# Patient Record
Sex: FEMALE | Race: BLACK OR AFRICAN AMERICAN | NOT HISPANIC OR LATINO | Employment: FULL TIME | URBAN - METROPOLITAN AREA
[De-identification: names, ages, dates, MRNs, and addresses within clinical notes are randomized per-mention and may not be internally consistent; named-entity substitution may affect disease eponyms.]

---

## 2017-01-23 ENCOUNTER — OFFICE VISIT (OUTPATIENT)
Dept: INTERNAL MEDICINE | Facility: CLINIC | Age: 52
End: 2017-01-23

## 2017-01-23 VITALS
HEIGHT: 63 IN | WEIGHT: 118.2 LBS | SYSTOLIC BLOOD PRESSURE: 136 MMHG | BODY MASS INDEX: 20.94 KG/M2 | TEMPERATURE: 97.7 F | DIASTOLIC BLOOD PRESSURE: 84 MMHG

## 2017-01-23 DIAGNOSIS — F41.8 DEPRESSION WITH ANXIETY: Primary | ICD-10-CM

## 2017-01-23 PROCEDURE — 99213 OFFICE O/P EST LOW 20 MIN: CPT | Performed by: FAMILY MEDICINE

## 2017-01-23 RX ORDER — BUSPIRONE HYDROCHLORIDE 10 MG/1
TABLET ORAL
Qty: 30 TABLET | Refills: 0 | Status: SHIPPED | OUTPATIENT
Start: 2017-01-23 | End: 2017-03-10 | Stop reason: SDUPTHER

## 2017-01-23 RX ORDER — VENLAFAXINE HYDROCHLORIDE 75 MG/1
75 CAPSULE, EXTENDED RELEASE ORAL DAILY
Qty: 30 CAPSULE | Refills: 1 | Status: SHIPPED | OUTPATIENT
Start: 2017-01-23 | End: 2017-03-10 | Stop reason: SDUPTHER

## 2017-01-23 NOTE — PATIENT INSTRUCTIONS
"1. PSYCH- depression with anxiety- discussed that she needs a little more push on the serotonin aspect. Will continue the effexor and add \"as needed\" buspar. Discussed that this works within 20 min and lasts about 8 hr. May consider returning to [facility] if needed. If she does not respond adequately, may consider a trial with low dose zyprexa.   2. RECHECK- 1mo  "

## 2017-01-23 NOTE — PROGRESS NOTES
Subjective   Jamaica Shell is a 51 y.o. female.     History of Present Illness   Here for 2mo recheck. Last seen 11/22/16 for 1mo recheck. Pt was seen 10/25/16 as a new patient. Lab 10/25/16 demo normal CBC, CMP, TSH, B12 and lipid with , tg 253, , . Had vit D 12.8 and was advised 5000 IU qd. Had baseline EKG 10/25/16 with ?LVH vs normal variant.     1.CV- HTN. Pt diagnosed 2011. Was treated with Lisinopril but had been out of meds x2yr with no insurance. No known cardio sequelea. Was started on Lisinopril HCT and did well.     2.PSYCH- depression with anxiety. At her initial visit pt reported that her son was shot and killed in 2011. She had not felt well since then. Was sad, irritable and anxious with excessive guilt, anhedonia, feeling overwhelmed, fatigue, withdrawing, decreased motivation, crying, some concentration/ memory problems, some feeling hopeless/ helpless. Was having insomnia but that had improved recently. Pt was started on Cymbalta and reached 50% of goal at 1mo but it was to expensive and she was changed to Effexor for the next 2mo. Today she reports no S/E. Does not feel at goal. Is irritable and has had some anxiety. Has had stress with not being able to see her granddaughter and has been very stressed at work. Has felt panicky 3-4 x/wk. Is still crying frequently and not sleeping well. Overall only ranks her improvement at 30% of goal.    The following portions of the patient's history were reviewed and updated as appropriate: current medications, past family history, past medical history, past social history, past surgical history and problem list.    Review of Systems   Cardiovascular: Negative for chest pain.   Gastrointestinal: Negative for abdominal distention and abdominal pain.   Skin: Negative for color change.   Neurological: Negative for tremors, speech difficulty and headaches.   Psychiatric/Behavioral: Negative for agitation and confusion.   All other systems  "reviewed and are negative.        Current Outpatient Prescriptions:   •  lisinopril-hydrochlorothiazide (ZESTORETIC) 10-12.5 MG per tablet, Take 1 tablet by mouth Daily., Disp: 90 tablet, Rfl: 1  •  venlafaxine XR (EFFEXOR XR) 75 MG 24 hr capsule, Take 1 capsule by mouth Daily., Disp: 30 capsule, Rfl: 1    Objective     Visit Vitals   • /84   • Temp 97.7 °F (36.5 °C)   • Ht 63\" (160 cm)   • Wt 118 lb 3.2 oz (53.6 kg)   • BMI 20.94 kg/m2       Physical Exam   Constitutional: She is oriented to person, place, and time. She appears well-developed and well-nourished.   HENT:   Right Ear: Tympanic membrane and ear canal normal.   Left Ear: Tympanic membrane and ear canal normal.   Mouth/Throat: Oropharynx is clear and moist.   Eyes: Conjunctivae and EOM are normal. Pupils are equal, round, and reactive to light.   Neck: No thyromegaly present.   Cardiovascular: Normal rate and regular rhythm.    Pulmonary/Chest: Effort normal and breath sounds normal.   Neurological: She is alert and oriented to person, place, and time.   Skin: Skin is warm and dry.   Psychiatric: She has a normal mood and affect.   Vitals reviewed.      Assessment/Plan   There are no diagnoses linked to this encounter.    1. PSYCH- depression with anxiety- discussed that she needs a little more push on the serotonin aspect. Will continue the effexor and add \"as needed\" buspar. Discussed that this works within 20 min and lasts about 8 hr. May consider returning to [facility] if needed. If she does not respond adequately, may consider a trial with low dose zyprexa.   2. RECHECK- 1mo       "

## 2017-01-23 NOTE — MR AVS SNAPSHOT
"                        Jamaica Shell   1/23/2017 8:30 AM   Office Visit    Dept Phone:  [phone]   Encounter #:  [id number]    Provider:  Albania Amador MD   Department:  [facility] PRIMARY [facility]ARE                Your Full Care Plan              Today's Medication Changes          These changes are accurate as of: 1/23/17  9:00 AM.  If you have any questions, ask your nurse or doctor.               New Medication(s)Ordered:     busPIRone 10 MG tablet   Commonly known as:  BUSPAR   1/2 tab po q8hr prn anxiety. 1 tab hs prn sleep   Started by:  Albania Amador MD            Where to Get Your Medications      These medications were sent to [address] - [facility] - [phone]  - [phone] [facility] [address] [place] [id number]     Phone:  [phone]     busPIRone 10 MG tablet    venlafaxine XR 75 MG 24 hr capsule                  Your Updated Medication List          This list is accurate as of: 1/23/17  9:00 AM.  Always use your most recent med list.                busPIRone 10 MG tablet   Commonly known as:  BUSPAR   1/2 tab po q8hr prn anxiety. 1 tab hs prn sleep       lisinopril-hydrochlorothiazide 10-12.5 MG per tablet   Commonly known as:  ZESTORETIC   Take 1 tablet by mouth Daily.       venlafaxine XR 75 MG 24 hr capsule   Commonly known as:  EFFEXOR XR   Take 1 capsule by mouth Daily.               You Were Diagnosed With        Codes Comments    Depression with anxiety    -  Primary ICD-10-CM: F41.8  ICD-9-CM: 300.4       Instructions    1. PSYCH- depression with anxiety- discussed that she needs a little more push on the serotonin aspect. Will continue the effexor and add \"as needed\" buspar. Discussed that this works within 20 min and lasts about 8 hr. May consider returning to [facility] if needed. If she does not respond adequately, may consider a trial with low dose " "zyprexa.   2. RECHECK- 1mo     Patient Instructions History      Upcoming Appointments     Visit Type Date Time Department    FOLLOW UP 2017  8:30 AM [facility]    FOLLOW UP 2017  8:30 AM [facility]      MyChart Signup     [facility] [employer] allows you to send messages to your doctor, view your test results, renew your prescriptions, schedule appointments, and more. To sign up, go to [employer] and click on the Sign Up Now link in the New User? box. Enter your [employer] Activation Code exactly as it appears below along with the last four digits of your Social Security Number and your Date of Birth () to complete the sign-up process. If you do not sign up before the expiration date, you must request a new code.    [employer] Activation Code: [email or web address]  Expires: 2017  5:38 AM    If you have questions, you can email [email or web address] or call [phone] to talk to our [employer] staff. Remember, [employer] is NOT to be used for urgent needs. For medical emergencies, dial 911.               Other Info from Your Visit           Your Appointments     2017  8:30 AM EST   Follow Up with Albania Amador MD   [facility] (--)    [facility] [address] [place] [id number] [phone]           Arrive 15 minutes prior to appointment.              Allergies     No Known Allergies      Reason for Visit     Follow-up 2 MONTH RECHECK HTN AND MOOD      Vital Signs     Blood Pressure Temperature Height Weight Body Mass Index Smoking Status    136/84 97.7 °F (36.5 °C) 63\" (160 cm) 118 lb 3.2 oz (53.6 kg) 20.94 kg/m2 Current Every Day Smoker      Problems and Diagnoses Noted     Depression with anxiety        "

## 2017-03-10 ENCOUNTER — OFFICE VISIT (OUTPATIENT)
Dept: INTERNAL MEDICINE | Facility: CLINIC | Age: 52
End: 2017-03-10

## 2017-03-10 VITALS
RESPIRATION RATE: 22 BRPM | HEIGHT: 63 IN | DIASTOLIC BLOOD PRESSURE: 98 MMHG | SYSTOLIC BLOOD PRESSURE: 162 MMHG | HEART RATE: 92 BPM | BODY MASS INDEX: 21.09 KG/M2 | WEIGHT: 119 LBS

## 2017-03-10 DIAGNOSIS — I10 ESSENTIAL HYPERTENSION: ICD-10-CM

## 2017-03-10 DIAGNOSIS — F41.8 DEPRESSION WITH ANXIETY: Primary | ICD-10-CM

## 2017-03-10 PROCEDURE — 99213 OFFICE O/P EST LOW 20 MIN: CPT | Performed by: FAMILY MEDICINE

## 2017-03-10 RX ORDER — VENLAFAXINE HYDROCHLORIDE 75 MG/1
75 CAPSULE, EXTENDED RELEASE ORAL DAILY
Qty: 90 CAPSULE | Refills: 1 | Status: SHIPPED | OUTPATIENT
Start: 2017-03-10 | End: 2017-07-19 | Stop reason: SDUPTHER

## 2017-03-10 RX ORDER — BUSPIRONE HYDROCHLORIDE 10 MG/1
TABLET ORAL
Qty: 30 TABLET | Refills: 0 | Status: SHIPPED | OUTPATIENT
Start: 2017-03-10 | End: 2017-10-04 | Stop reason: SDUPTHER

## 2017-03-10 NOTE — PROGRESS NOTES
Subjective   Jamaica Shell is a 51 y.o. female.     History of Present Illness   Here for 1mo recheck. Last seen 1/23/17 for 2mo recheck. Pt was seen 10/25/16 as a new patient. Lab 10/25/16 demo normal CBC, CMP, TSH, B12 and lipid with , tg 253, , . Had vit D 12.8 and was advised 5000 IU qd. Had baseline EKG 10/25/16 with ?LVH vs normal variant.     1.CV- HTN. Pt diagnosed 2011. Was treated with Lisinopril but had been out of meds x2yr with no insurance. No known cardio sequelea. Was started on Lisinopril HCT and did well.      2.PSYCH- depression with anxiety. At her initial visit pt reported that her son was shot and killed in 2011. She had not felt well since then. Was sad, irritable and anxious with excessive guilt, anhedonia, feeling overwhelmed, fatigue, withdrawing, decreased motivation, crying, some concentration/ memory problems, some feeling hopeless/ helpless. Was having insomnia but that had improved recently. Pt reached 50% on Cymbalta at 1mo but it was expensive and she was changed to Effexor. Had increased stress related to not being allowed to see her grand daughter and was feeling worse. Had relapsed to only 30% of goal with panic attacks. Was given addition of prn buspar and discussed possible addition of atypical. Today she reports no S/E with the buspar and it has worked well. Lately she has needed it more related to the same issue with her daughter/ grand daughter. Is tearful today related to this with elevated BP. Has been working more to keep herself busy. Overall feels well on the effexor.     The following portions of the patient's history were reviewed and updated as appropriate: allergies, current medications, past family history, past medical history, past social history, past surgical history and problem list.    Review of Systems   Constitutional: Negative.    HENT: Negative.    Eyes: Negative.    Respiratory: Negative.    Cardiovascular: Negative.   "  Gastrointestinal: Negative.    Endocrine: Negative.    Genitourinary: Negative.    Musculoskeletal: Negative.    Skin: Negative.    Allergic/Immunologic: Negative.    Neurological: Negative.    Hematological: Negative.    Psychiatric/Behavioral: Negative.          Current Outpatient Prescriptions:   •  busPIRone (BUSPAR) 10 MG tablet, 1/2 tab po q8hr prn anxiety. 1 tab hs prn sleep, Disp: 30 tablet, Rfl: 0  •  lisinopril-hydrochlorothiazide (ZESTORETIC) 10-12.5 MG per tablet, Take 1 tablet by mouth Daily., Disp: 90 tablet, Rfl: 1  •  venlafaxine XR (EFFEXOR XR) 75 MG 24 hr capsule, Take 1 capsule by mouth Daily., Disp: 90 capsule, Rfl: 1    Objective     Visit Vitals   • /98   • Pulse 92   • Resp 22   • Ht 63\" (160 cm)   • Wt 119 lb (54 kg)   • BMI 21.08 kg/m2       Physical Exam   Constitutional: She is oriented to person, place, and time. She appears well-developed and well-nourished.   HENT:   Right Ear: Tympanic membrane and ear canal normal.   Left Ear: Tympanic membrane and ear canal normal.   Mouth/Throat: Oropharynx is clear and moist.   Eyes: Conjunctivae and EOM are normal. Pupils are equal, round, and reactive to light.   Neck: No thyromegaly present.   Cardiovascular: Normal rate and regular rhythm.    Pulmonary/Chest: Effort normal and breath sounds normal.   Neurological: She is alert and oriented to person, place, and time.   Skin: Skin is warm and dry.   Psychiatric: She has a normal mood and affect.   Vitals reviewed.      Assessment/Plan   Jamaica was seen today for follow-up, anxiety and depression.    Diagnoses and all orders for this visit:    Depression with anxiety  -     busPIRone (BUSPAR) 10 MG tablet; 1/2 tab po q8hr prn anxiety. 1 tab hs prn sleep  -     venlafaxine XR (EFFEXOR XR) 75 MG 24 hr capsule; Take 1 capsule by mouth Daily.    Essential hypertension      1. PSYCH- depression with anxiety- mood at goal. Discussed that she is still having situational stress. Will recheck her " BP now as it is up but likely related to stress. Will continue current and recheck in 3mo  2. RECHECK- 3mo

## 2017-03-10 NOTE — PATIENT INSTRUCTIONS
1. PSYCH- depression with anxiety- mood at goal. Discussed that she is still having situational stress. Will recheck her BP now as it is up but likely related to stress. Will continue current and recheck in 3mo  2. RECHECK- 3mo

## 2017-07-18 ENCOUNTER — TELEPHONE (OUTPATIENT)
Dept: INTERNAL MEDICINE | Facility: CLINIC | Age: 52
End: 2017-07-18

## 2017-07-18 DIAGNOSIS — F41.8 DEPRESSION WITH ANXIETY: ICD-10-CM

## 2017-07-18 DIAGNOSIS — I10 ESSENTIAL HYPERTENSION: ICD-10-CM

## 2017-07-19 RX ORDER — LISINOPRIL AND HYDROCHLOROTHIAZIDE 12.5; 1 MG/1; MG/1
1 TABLET ORAL DAILY
Qty: 30 TABLET | Refills: 0 | Status: SHIPPED | OUTPATIENT
Start: 2017-07-19 | End: 2017-10-04 | Stop reason: SDUPTHER

## 2017-07-19 RX ORDER — VENLAFAXINE HYDROCHLORIDE 75 MG/1
75 CAPSULE, EXTENDED RELEASE ORAL DAILY
Qty: 30 CAPSULE | Refills: 0 | Status: SHIPPED | OUTPATIENT
Start: 2017-07-19 | End: 2017-09-01 | Stop reason: SDUPTHER

## 2017-07-19 NOTE — TELEPHONE ENCOUNTER
"Pt had appt scheduled but moved this as she states that she has a bill with [facility] and does not want to \"run up the bill\" anymore. PT was advised that we will send in one refill until she can get in touch with Charu to discuss this. Rx faxed to pt pharm and appt rescheduled.   "

## 2017-09-01 ENCOUNTER — OFFICE VISIT (OUTPATIENT)
Dept: INTERNAL MEDICINE | Facility: CLINIC | Age: 52
End: 2017-09-01

## 2017-09-01 VITALS — BODY MASS INDEX: 21.9 KG/M2 | TEMPERATURE: 97.7 F | HEIGHT: 63 IN | WEIGHT: 123.6 LBS

## 2017-09-01 DIAGNOSIS — R19.7 DIARRHEA, UNSPECIFIED TYPE: ICD-10-CM

## 2017-09-01 DIAGNOSIS — F41.8 DEPRESSION WITH ANXIETY: Primary | ICD-10-CM

## 2017-09-01 PROCEDURE — 99214 OFFICE O/P EST MOD 30 MIN: CPT | Performed by: FAMILY MEDICINE

## 2017-09-01 RX ORDER — VENLAFAXINE HYDROCHLORIDE 75 MG/1
75 CAPSULE, EXTENDED RELEASE ORAL DAILY
Qty: 30 CAPSULE | Refills: 0 | Status: SHIPPED | OUTPATIENT
Start: 2017-09-01 | End: 2017-10-04 | Stop reason: SDUPTHER

## 2017-09-01 RX ORDER — OLANZAPINE 5 MG/1
TABLET ORAL
Qty: 30 TABLET | Refills: 0 | Status: SHIPPED | OUTPATIENT
Start: 2017-09-01 | End: 2017-10-04 | Stop reason: SDUPTHER

## 2017-09-01 NOTE — PROGRESS NOTES
Subjective   Jamaica Shell is a 51 y.o. female.     History of Present Illness   Here today as a work in for diarrhea. Last seen 3/10/17 for mood. Pt was seen 10/25/16 as a new patient. Lab 10/25/16 demo normal CBC, CMP, TSH, B12 and lipid with , tg 253, , . Had vit D 12.8 and was advised 5000 IU qd. Had baseline EKG 10/25/16 with ?LVH vs normal variant.    PSYCH- depression with anxiety. At her initial visit pt reported that her son was shot and killed in 2011 with subsequent mood issues. Was sad, irritable and anxious with excessive guilt, anhedonia, feeling overwhelmed, fatigue, withdrawing, decreased motivation, crying, some concentration/ memory problems, some feeling hopeless/ helpless. Did not reach goal on cymbalta and it was expensive. Was changed to effexor. Her then had increased stress related to her daughter. Was given prn buspar. At her last visit she was still tearful about her daughter but was otherwise feeling well on the effexor. Did not keep the 3mo recheck. Today she reports she could not afford her recheck. She still has panic attacks, just not as often. Is still sad in general, especially at work. Is not happy at her job; loves her work but not some of her new coworkers. Is getting angry at work related to these co workers. Is having poor appetite and only eating 1 meal a day.    GI- diarrhea. Today pt reports this started a couple of weeks ago. Is getting worse and she is having stool incontinence. She is also having watery diarrhea when she urinates. Has started to pass out. No epigastric pain. Has occasional lower abdominal pain. No h/o constipation. No fever, no blood in stool. Has been more stressed and drinking more beer. Drinks coffee and water all day and only eating once a day. Still has anxiety attacks but felt like she was handling them well.     The following portions of the patient's history were reviewed and updated as appropriate: current medications, past  "family history, past medical history, past social history, past surgical history and problem list.    Review of Systems   Cardiovascular: Negative for chest pain.   Gastrointestinal: Positive for diarrhea. Negative for abdominal distention and abdominal pain.   Skin: Negative for color change.   Neurological: Negative for tremors, speech difficulty and headaches.   Psychiatric/Behavioral: Negative for agitation and confusion.   All other systems reviewed and are negative.        Current Outpatient Prescriptions:   •  busPIRone (BUSPAR) 10 MG tablet, 1/2 tab po q8hr prn anxiety. 1 tab hs prn sleep, Disp: 30 tablet, Rfl: 0  •  lisinopril-hydrochlorothiazide (ZESTORETIC) 10-12.5 MG per tablet, Take 1 tablet by mouth Daily., Disp: 30 tablet, Rfl: 0  •  OLANZapine (zyPREXA) 5 MG tablet, 1 tab po qhs, Disp: 30 tablet, Rfl: 0  •  venlafaxine XR (EFFEXOR XR) 75 MG 24 hr capsule, Take 1 capsule by mouth Daily., Disp: 30 capsule, Rfl: 0    Objective     Temp 97.7 °F (36.5 °C)  Ht 63\" (160 cm)  Wt 123 lb 9.6 oz (56.1 kg)  BMI 21.89 kg/m2    Physical Exam   Constitutional: She is oriented to person, place, and time. She appears well-developed and well-nourished.   HENT:   Right Ear: Tympanic membrane and ear canal normal.   Left Ear: Tympanic membrane and ear canal normal.   Mouth/Throat: Oropharynx is clear and moist.   Eyes: Conjunctivae and EOM are normal. Pupils are equal, round, and reactive to light.   Neck: No thyromegaly present.   Cardiovascular: Normal rate and regular rhythm.    Pulmonary/Chest: Effort normal and breath sounds normal.   Neurological: She is alert and oriented to person, place, and time.   Skin: Skin is warm and dry.   Psychiatric: She has a normal mood and affect.   Vitals reviewed.      Assessment/Plan   Jamaica was seen today for diarrhea.    Diagnoses and all orders for this visit:    Depression with anxiety  -     OLANZapine (zyPREXA) 5 MG tablet; 1 tab po qhs  -     venlafaxine XR (EFFEXOR " XR) 75 MG 24 hr capsule; Take 1 capsule by mouth Daily.    Diarrhea, unspecified type  -     XR Abdomen KUB      1. GI- diarrhea- discussed that this could relate to diet including the beer/ coffee, to the mood itself or to other underlying GI issues such as bowel blockage or infection. Will check a KUB xray. Pt will take probiotics. Will also address mood further. If diarrhea continues, will do stool studies, etc.  2. PSYCH- depression with anxiety- discussed that the effexor is still doing its job but I believe she has an additional chemical imbalance and this is now contributing to her symptoms significantly. Will continue the effexor and add low doze zyprexa.   3. RECHECK- 1mo

## 2017-09-01 NOTE — PATIENT INSTRUCTIONS
1. GI- diarrhea- discussed that this could relate to diet including the beer/ coffee, to the mood itself or to other underlying GI issues such as bowel blockage or infection. Will check a KUB xray. Pt will take probiotics. Will also address mood further. If diarrhea continues, will do stool studies, etc.  2. PSYCH- depression with anxiety- discussed that the effexor is still doing its job but I believe she has an additional chemical imbalance and this is now contributing to her symptoms significantly. Will continue the effexor and add low doze zyprexa.   3. RECHECK- 1mo

## 2017-09-05 DIAGNOSIS — F41.8 DEPRESSION WITH ANXIETY: ICD-10-CM

## 2017-09-05 RX ORDER — BUSPIRONE HYDROCHLORIDE 10 MG/1
TABLET ORAL
Qty: 30 TABLET | Refills: 0 | Status: SHIPPED | OUTPATIENT
Start: 2017-09-05 | End: 2019-05-20

## 2017-09-29 ENCOUNTER — HOSPITAL ENCOUNTER (OUTPATIENT)
Dept: GENERAL RADIOLOGY | Facility: HOSPITAL | Age: 52
Discharge: HOME | End: 2017-09-29
Attending: FAMILY MEDICINE | Admitting: FAMILY MEDICINE

## 2017-09-29 PROCEDURE — 74000 HC ABDOMEN KUB: CPT

## 2017-10-04 ENCOUNTER — OFFICE VISIT (OUTPATIENT)
Dept: INTERNAL MEDICINE | Facility: CLINIC | Age: 52
End: 2017-10-04

## 2017-10-04 VITALS
SYSTOLIC BLOOD PRESSURE: 118 MMHG | HEIGHT: 63 IN | WEIGHT: 127 LBS | BODY MASS INDEX: 22.5 KG/M2 | TEMPERATURE: 97.4 F | DIASTOLIC BLOOD PRESSURE: 76 MMHG

## 2017-10-04 DIAGNOSIS — J30.9 ACUTE ALLERGIC RHINITIS, UNSPECIFIED SEASONALITY, UNSPECIFIED TRIGGER: ICD-10-CM

## 2017-10-04 DIAGNOSIS — F41.8 DEPRESSION WITH ANXIETY: ICD-10-CM

## 2017-10-04 DIAGNOSIS — J45.20 MILD INTERMITTENT ASTHMA WITHOUT COMPLICATION: ICD-10-CM

## 2017-10-04 DIAGNOSIS — I10 ESSENTIAL HYPERTENSION: Primary | ICD-10-CM

## 2017-10-04 PROCEDURE — 99214 OFFICE O/P EST MOD 30 MIN: CPT | Performed by: FAMILY MEDICINE

## 2017-10-04 RX ORDER — OLANZAPINE 5 MG/1
TABLET ORAL
Qty: 30 TABLET | Refills: 1 | Status: SHIPPED | OUTPATIENT
Start: 2017-10-04 | End: 2017-12-11 | Stop reason: SDUPTHER

## 2017-10-04 RX ORDER — LISINOPRIL AND HYDROCHLOROTHIAZIDE 12.5; 1 MG/1; MG/1
1 TABLET ORAL DAILY
Qty: 30 TABLET | Refills: 5 | Status: SHIPPED | OUTPATIENT
Start: 2017-10-04 | End: 2018-05-14 | Stop reason: SDUPTHER

## 2017-10-04 RX ORDER — ALBUTEROL SULFATE 90 UG/1
AEROSOL, METERED RESPIRATORY (INHALATION)
Qty: 18 G | Refills: 0 | Status: SHIPPED | OUTPATIENT
Start: 2017-10-04 | End: 2020-04-24 | Stop reason: SDUPTHER

## 2017-10-04 RX ORDER — VENLAFAXINE HYDROCHLORIDE 75 MG/1
75 CAPSULE, EXTENDED RELEASE ORAL DAILY
Qty: 30 CAPSULE | Refills: 5 | Status: SHIPPED | OUTPATIENT
Start: 2017-10-04 | End: 2018-05-14 | Stop reason: SDUPTHER

## 2017-10-04 RX ORDER — FLUTICASONE PROPIONATE 50 MCG
2 SPRAY, SUSPENSION (ML) NASAL DAILY
Qty: 16 G | Refills: 0 | Status: SHIPPED | OUTPATIENT
Start: 2017-10-04 | End: 2019-05-20

## 2017-10-04 NOTE — PROGRESS NOTES
Subjective   Jamaica Shell is a 51 y.o. female.     History of Present Illness   Here for 1mo recheck. Last seen 9/1/17 for 1mo recheck. Pt was seen 10/25/16 as a new patient. Lab 10/25/16 demo normal CBC, CMP, TSH, B12 and lipid with , tg 253, , . Had vit D 12.8 and was advised 5000 IU qd. Had baseline EKG 10/25/16 with ?LVH vs normal variant.     1.CV- HTN. Pt diagnosed 2011. Was treated with Lisinopril but had been out of meds x2yr with no insurance. No known cardio se[place]lea. Was started on Lisinopril HCT and did well. BP was up to 162/98 while stressed. This improved once mood addressed. Today pt reports she has been doing home checks and they are improved since her mood as been addressed.     2.PSYCH- depression with anxiety. At her initial visit pt reported that her son was shot and killed in 2011. She had not felt well since then. Was sad, irritable and anxious with excessive guilt, anhedonia, feeling overwhelmed, fatigue, withdrawing, decreased motivation, crying, some concentration/ memory problems, some feeling hopeless/ helpless. Was having insomnia but that had improved recently. Pt reached 50% on Cymbalta at 1mo but it was expensive and she was changed to Effexor. Had increased stress related to not being allowed to see her grand daughter and was feeling worse. Had relapsed to only 30% of goal with panic attacks. Was given addition of prn buspar and discussed possible addition of atypical. At her last visit she reported no S/E with the buspar and it worked well. Overall felt well on Effexor with prn buspar. Was continued on same until her last visit 9/1/17 at which time she reported that she was feeling sad again and getting angry easily. Was also having some panic attacks. Loves her job but was feeling stressed at work. Was continued on effexor and given the addition of zyprexa. Today she reports she is doing well now.     3. GI- IBS-diarrhea. At her last visit pt reported that she  had had diarrheas for a couple of weeks but it was worsening and she was actually become incontinent of stool. No upper GI pain. Had been drinking a lot of beer. Was advised probiotics and to stop the beer. KUB was normal. Today pt reports she forgot the probiotic but did stop the EtOH and is feeling much better. Still has occasional loose stool but no diarrhea or incontinence.    4. RESP- pt smokes <1ppd. She has an occasional cough and wheezing, especially when the weather changes. Has been told she has asthma related to the smoking. No current inhaler and has been having increased cough and wheeze with the change to fall. Cough is productive of non purulent phlegm. Is taking OTC allergy meds for her sinuses.    The following portions of the patient's history were reviewed and updated as appropriate: current medications, past family history, past medical history, past social history, past surgical history and problem list.    Review of Systems   Cardiovascular: Negative for chest pain.   Gastrointestinal: Negative for abdominal distention and abdominal pain.   Skin: Negative for color change.   Neurological: Negative for tremors, speech difficulty and headaches.   Psychiatric/Behavioral: Negative for agitation and confusion.   All other systems reviewed and are negative.        Current Outpatient Prescriptions:   •  albuterol (PROVENTIL HFA;VENTOLIN HFA) 108 (90 Base) MCG/ACT inhaler, 2 puffs q4-6 hr prn cough, shortness of breath or wheezing, Disp: 18 g, Rfl: 0  •  busPIRone (BUSPAR) 10 MG tablet, take 1/2 tablet by mouth every 8 hours if needed for anxiety then 1 tablet at bedtime if needed for sleep, Disp: 30 tablet, Rfl: 0  •  fluticasone (FLONASE) 50 MCG/ACT nasal spray, 2 sprays into each nostril Daily., Disp: 16 g, Rfl: 0  •  lisinopril-hydrochlorothiazide (ZESTORETIC) 10-12.5 MG per tablet, Take 1 tablet by mouth Daily., Disp: 30 tablet, Rfl: 5  •  OLANZapine (zyPREXA) 5 MG tablet, 1 tab po qhs, Disp: 30  "tablet, Rfl: 1  •  venlafaxine XR (EFFEXOR XR) 75 MG 24 hr capsule, Take 1 capsule by mouth Daily., Disp: 30 capsule, Rfl: 5    Objective     /76  Temp 97.4 °F (36.3 °C)  Ht 63\" (160 cm)  Wt 127 lb (57.6 kg)  BMI 22.5 kg/m2    Physical Exam   Constitutional: She is oriented to person, place, and time. She appears well-developed and well-nourished.   HENT:   Right Ear: Tympanic membrane and ear canal normal.   Left Ear: Tympanic membrane and ear canal normal.   Mouth/Throat: Oropharynx is clear and moist.   Eyes: Conjunctivae and EOM are normal. Pupils are equal, round, and reactive to light.   Neck: No thyromegaly present.   Cardiovascular: Normal rate and regular rhythm.    Pulmonary/Chest: Effort normal. She has wheezes.   Neurological: She is alert and oriented to person, place, and time.   Skin: Skin is warm and dry.   Psychiatric: She has a normal mood and affect.   Vitals reviewed.      Assessment/Plan   Jamaica was seen today for follow-up.    Diagnoses and all orders for this visit:    Essential hypertension  -     lisinopril-hydrochlorothiazide (ZESTORETIC) 10-12.5 MG per tablet; Take 1 tablet by mouth Daily.    Depression with anxiety  -     OLANZapine (zyPREXA) 5 MG tablet; 1 tab po qhs  -     venlafaxine XR (EFFEXOR XR) 75 MG 24 hr capsule; Take 1 capsule by mouth Daily.    Mild intermittent asthma without complication  -     albuterol (PROVENTIL HFA;VENTOLIN HFA) 108 (90 Base) MCG/ACT inhaler; 2 puffs q4-6 hr prn cough, shortness of breath or wheezing    Acute allergic rhinitis, unspecified seasonality, unspecified trigger  -     fluticasone (FLONASE) 50 MCG/ACT nasal spray; 2 sprays into each nostril Daily.      1. PSYCH- depression with anxiety- pt doing well with combo effexor and zyprexa. Will continue this and recheck in 2mo to ensure she reaches goal. RF today.  2. GI- IBS-D- discussed that the bowels are improving as we address her mood and stop the beer. Discussed that many people have " difficulty digesting wheat/ gluten. To try to avoid excess wheat, including the beer. If needed, she can still take a month of probiotic but may not need this as she should just finish improving with time.   3. CV- HTN- BP at goal. RF the lisinopril HCT x6mo now.  4. RESP- asthma and allergies. Pt is advised that she has seasonal allergies and is also more at risk due to the smoking. Advised to come in to discuss quitting when ready. Will treat with flonase and rescue inhaler now. Pt advised that some of her cough is from sinus drainage and some is from wheezing. To use the flonase to dry up the drainage and then use the inhaler as needed. To keep track of how often she is needing the inhaler for discussion at her next visit.  5. RECHECK- 2mo

## 2017-10-04 NOTE — PATIENT INSTRUCTIONS
1. PSYCH- depression with anxiety- pt doing well with combo effexor and zyprexa. Will continue this and recheck in 2mo to ensure she reaches goal. RF today.  2. GI- IBS-D- discussed that the bowels are improving as we address her mood and stop the beer. Discussed that many people have difficulty digesting wheat/ gluten. To try to avoid excess wheat, including the beer. If needed, she can still take a month of probiotic but may not need this as she should just finish improving with time.   3. CV- HTN- BP at goal. RF the lisinopril HCT x6mo now.  4. RESP- asthma and allergies. Pt is advised that she has seasonal allergies and is also more at risk due to the smoking. Advised to come in to discuss quitting when ready. Will treat with flonase and rescue inhaler now. Pt advised that some of her cough is from sinus drainage and some is from wheezing. To use the flonase to dry up the drainage and then use the inhaler as needed. To keep track of how often she is needing the inhaler for discussion at her next visit.  5. RECHECK- 2mo

## 2017-12-11 ENCOUNTER — OFFICE VISIT (OUTPATIENT)
Dept: INTERNAL MEDICINE | Facility: CLINIC | Age: 52
End: 2017-12-11

## 2017-12-11 VITALS
DIASTOLIC BLOOD PRESSURE: 88 MMHG | WEIGHT: 130 LBS | TEMPERATURE: 97.5 F | SYSTOLIC BLOOD PRESSURE: 140 MMHG | BODY MASS INDEX: 23.04 KG/M2 | HEIGHT: 63 IN

## 2017-12-11 DIAGNOSIS — J45.20 MILD INTERMITTENT ASTHMA WITHOUT COMPLICATION: ICD-10-CM

## 2017-12-11 DIAGNOSIS — F41.8 DEPRESSION WITH ANXIETY: Primary | ICD-10-CM

## 2017-12-11 PROBLEM — K58.0 IRRITABLE BOWEL SYNDROME WITH DIARRHEA: Status: ACTIVE | Noted: 2017-12-11

## 2017-12-11 PROCEDURE — 99213 OFFICE O/P EST LOW 20 MIN: CPT | Performed by: FAMILY MEDICINE

## 2017-12-11 RX ORDER — OLANZAPINE 5 MG/1
TABLET ORAL
Qty: 30 TABLET | Refills: 2 | Status: SHIPPED | OUTPATIENT
Start: 2017-12-11 | End: 2018-07-09

## 2017-12-11 NOTE — PROGRESS NOTES
Subjective   Jamaica Shell is a 52 y.o. female.     History of Present Illness   Here for 2mo recheck mood and asthma. Last seen 10/4/17 1mo recheck. Pt was seen 10/25/16 as a new patient. Lab 10/25/16 demo normal CBC, CMP, TSH, B12 and lipid with , tg 253, , . Had vit D 12.8 and was advised 5000 IU qd. Had baseline EKG 10/25/16 with ?LVH vs normal variant.     1.CV- HTN. Pt diagnosed 2011. Was treated with Lisinopril but had been out of meds x2yr with no insurance. No known cardio se[place]lea. Was started on Lisinopril HCT and did well. BP was up to 162/98 while stressed. This improved once mood addressed..  2. 3. GI- IBS-diarrhea. At her last visit pt reported that she had had diarrheas for a couple of weeks but it was worsening and she was actually become incontinent of stool. No upper GI pain. Had been drinking a lot of beer. KUB was normal. Pt stopped EtOH and did better. Was also advised to decrease gluten    3. PSYCH- depression with anxiety. At her initial visit pt reported that her son was shot and killed in 2011. She had not felt well since then. Was sad, irritable and anxious with excessive guilt, anhedonia, feeling overwhelmed, fatigue, withdrawing, decreased motivation, crying, some concentration/ memory problems, some feeling hopeless/ helpless. Responded to Cymbalta but this was changed to Effexor due to cost. Had increased stress and ddi not reach goal, still feeling sad, angry and some panic. Was given the addition of Zyprexa and did much better. Today she reports she feels her mood is at goal. Drinks 1 glass of wine a day, no beer.     4. RESP- asthma, allergies. Diagnosed 10/4/17. Pt smokes <1ppd. Was having intermittent sinus and wheezing c/o. Was given flonase and rescue inhaler. Today she reports this resolved; she only needed the flonase x1 and did not need the rescue at all.     5. GYN- today pt reports she is waking up wringing wet. Stopped having periods 2 yr ago. No  "recent CPE.    The following portions of the patient's history were reviewed and updated as appropriate: current medications, past family history, past medical history, past social history, past surgical history and problem list.    Review of Systems   Cardiovascular: Negative for chest pain.   Gastrointestinal: Negative for abdominal distention and abdominal pain.   Skin: Negative for color change.   Neurological: Negative for tremors, speech difficulty and headaches.   Psychiatric/Behavioral: Negative for agitation and confusion.   All other systems reviewed and are negative.        Current Outpatient Prescriptions:   •  albuterol (PROVENTIL HFA;VENTOLIN HFA) 108 (90 Base) MCG/ACT inhaler, 2 puffs q4-6 hr prn cough, shortness of breath or wheezing, Disp: 18 g, Rfl: 0  •  busPIRone (BUSPAR) 10 MG tablet, take 1/2 tablet by mouth every 8 hours if needed for anxiety then 1 tablet at bedtime if needed for sleep, Disp: 30 tablet, Rfl: 0  •  fluticasone (FLONASE) 50 MCG/ACT nasal spray, 2 sprays into each nostril Daily., Disp: 16 g, Rfl: 0  •  lisinopril-hydrochlorothiazide (ZESTORETIC) 10-12.5 MG per tablet, Take 1 tablet by mouth Daily., Disp: 30 tablet, Rfl: 5  •  OLANZapine (zyPREXA) 5 MG tablet, 1 tab po qhs, Disp: 30 tablet, Rfl: 2  •  venlafaxine XR (EFFEXOR XR) 75 MG 24 hr capsule, Take 1 capsule by mouth Daily., Disp: 30 capsule, Rfl: 5    Objective     /88  Temp 97.5 °F (36.4 °C)  Ht 160 cm (63\")  Wt 59 kg (130 lb)  BMI 23.03 kg/m2    Physical Exam   Constitutional: She is oriented to person, place, and time. She appears well-developed and well-nourished.   HENT:   Right Ear: Tympanic membrane and ear canal normal.   Left Ear: Tympanic membrane and ear canal normal.   Mouth/Throat: Oropharynx is clear and moist.   Eyes: Conjunctivae and EOM are normal. Pupils are equal, round, and reactive to light.   Neck: No thyromegaly present.   Cardiovascular: Normal rate and regular rhythm.    Pulmonary/Chest: " Effort normal and breath sounds normal.   Neurological: She is alert and oriented to person, place, and time.   Skin: Skin is warm and dry.   Psychiatric: She has a normal mood and affect.   Vitals reviewed.      Assessment/Plan   Jamaica was seen today for follow-up.    Diagnoses and all orders for this visit:    Depression with anxiety  -     OLANZapine (zyPREXA) 5 MG tablet; 1 tab po qhs    Mild intermittent asthma without complication      1. PSYCH- depression with anxiety- mood at goal. RF zyprexa and effexor x6mo today.  2. RESP- asthma and allergies- discussed that she may be allergic to ragweed and will not have issues again until next fall. However, she could have other pollen issues and we will see how she does in spring. To keep the flonase and rescue inhaler to use if needed seasonally.  3. GYN- lisa menopause- education today re the process. Discussed options and pt will try Evening Primrose first.   4. RECHECK- 3mo CPE, fasting (will need RF all meds)

## 2017-12-11 NOTE — PATIENT INSTRUCTIONS
1. PSYCH- depression with anxiety- mood at goal. RF zyprexa and effexor x6mo today.  2. RESP- asthma and allergies- discussed that she may be allergic to ragweed and will not have issues again until next fall. However, she could have other pollen issues and we will see how she does in spring. To keep the flonase and rescue inhaler to use if needed seasonally.  3. GYN- lisa menopause- education today re the process. Discussed options and pt will try Evening Primrose first.   4. RECHECK- 3mo CPE, fasting (will need RF all meds)

## 2018-05-14 DIAGNOSIS — F41.8 DEPRESSION WITH ANXIETY: ICD-10-CM

## 2018-05-14 DIAGNOSIS — I10 ESSENTIAL HYPERTENSION: ICD-10-CM

## 2018-05-15 RX ORDER — LISINOPRIL AND HYDROCHLOROTHIAZIDE 12.5; 1 MG/1; MG/1
TABLET ORAL
Qty: 30 TABLET | Refills: 0 | Status: SHIPPED | OUTPATIENT
Start: 2018-05-15 | End: 2018-06-28 | Stop reason: SDUPTHER

## 2018-05-15 RX ORDER — VENLAFAXINE HYDROCHLORIDE 75 MG/1
CAPSULE, EXTENDED RELEASE ORAL
Qty: 30 CAPSULE | Refills: 0 | Status: SHIPPED | OUTPATIENT
Start: 2018-05-15 | End: 2018-06-28 | Stop reason: SDUPTHER

## 2018-06-26 DIAGNOSIS — I10 ESSENTIAL HYPERTENSION: ICD-10-CM

## 2018-06-26 DIAGNOSIS — F41.8 DEPRESSION WITH ANXIETY: ICD-10-CM

## 2018-06-27 RX ORDER — VENLAFAXINE HYDROCHLORIDE 75 MG/1
CAPSULE, EXTENDED RELEASE ORAL
Qty: 30 CAPSULE | Refills: 0 | OUTPATIENT
Start: 2018-06-27

## 2018-06-27 RX ORDER — LISINOPRIL AND HYDROCHLOROTHIAZIDE 12.5; 1 MG/1; MG/1
TABLET ORAL
Qty: 30 TABLET | Refills: 0 | OUTPATIENT
Start: 2018-06-27

## 2018-06-28 DIAGNOSIS — F41.8 DEPRESSION WITH ANXIETY: ICD-10-CM

## 2018-06-28 DIAGNOSIS — I10 ESSENTIAL HYPERTENSION: ICD-10-CM

## 2018-06-28 RX ORDER — VENLAFAXINE HYDROCHLORIDE 75 MG/1
CAPSULE, EXTENDED RELEASE ORAL
Qty: 30 CAPSULE | Refills: 0 | Status: SHIPPED | OUTPATIENT
Start: 2018-06-28 | End: 2018-07-09 | Stop reason: SDUPTHER

## 2018-06-28 RX ORDER — LISINOPRIL AND HYDROCHLOROTHIAZIDE 12.5; 1 MG/1; MG/1
TABLET ORAL
Qty: 30 TABLET | Refills: 0 | Status: SHIPPED | OUTPATIENT
Start: 2018-06-28 | End: 2018-07-09 | Stop reason: SDUPTHER

## 2018-07-09 ENCOUNTER — OFFICE VISIT (OUTPATIENT)
Dept: INTERNAL MEDICINE | Facility: CLINIC | Age: 53
End: 2018-07-09

## 2018-07-09 VITALS
HEIGHT: 63 IN | TEMPERATURE: 97.8 F | WEIGHT: 131.2 LBS | BODY MASS INDEX: 23.25 KG/M2 | HEART RATE: 74 BPM | SYSTOLIC BLOOD PRESSURE: 138 MMHG | OXYGEN SATURATION: 99 % | DIASTOLIC BLOOD PRESSURE: 88 MMHG

## 2018-07-09 DIAGNOSIS — F41.8 DEPRESSION WITH ANXIETY: ICD-10-CM

## 2018-07-09 DIAGNOSIS — I10 ESSENTIAL HYPERTENSION: Primary | ICD-10-CM

## 2018-07-09 PROCEDURE — 99214 OFFICE O/P EST MOD 30 MIN: CPT | Performed by: FAMILY MEDICINE

## 2018-07-09 RX ORDER — ARIPIPRAZOLE 10 MG/1
10 TABLET ORAL DAILY
Qty: 30 TABLET | Refills: 0 | Status: SHIPPED | OUTPATIENT
Start: 2018-07-09 | End: 2018-09-15 | Stop reason: SDUPTHER

## 2018-07-09 RX ORDER — VENLAFAXINE HYDROCHLORIDE 75 MG/1
75 CAPSULE, EXTENDED RELEASE ORAL DAILY
Qty: 30 CAPSULE | Refills: 6 | Status: SHIPPED | OUTPATIENT
Start: 2018-07-09 | End: 2018-10-12 | Stop reason: SDUPTHER

## 2018-07-09 RX ORDER — LISINOPRIL AND HYDROCHLOROTHIAZIDE 12.5; 1 MG/1; MG/1
1 TABLET ORAL DAILY
Qty: 30 TABLET | Refills: 0 | Status: SHIPPED | OUTPATIENT
Start: 2018-07-09 | End: 2018-09-15 | Stop reason: SDUPTHER

## 2018-07-09 NOTE — PATIENT INSTRUCTIONS
1. CV- HTN- reassured that her pressure is not high enough to be of immediate concern. Will address her mood and then reassess her BP.  2. PSYCH- depression with anxiety- mood relapsing and pt having side effects. Will continue the efffexor 75 and change the zyprexa to abilify and increase the dose to 10mg.  3. RECHECK- 1mo routine (pt needs CPE in 7mo)

## 2018-07-09 NOTE — PROGRESS NOTES
Subjective   Jamaica Shell is a 52 y.o. female.     History of Present Illness   Here today as a work in for BP concerns. Pt cancelled appt 3/2018 for CPE.  Last seen 12/11/17 for 2mo recheck mood and asthma. Pt was seen 10/25/16 as a new patient. Lab 10/25/16 demo normal CBC, CMP, TSH, B12 and lipid with , tg 253, , . Had vit D 12.8 and was advised 5000 IU qd. Had baseline EKG 10/25/16 with ?LVH vs normal variant.     1.CV- HTN. Pt diagnosed 2011. Was treated with Lisinopril but had been out of meds x2yr with no insurance. No known cardio se[place]lea. Was started on Lisinopril HCT and did well. BP still elevated on occasion related to stress, but was transient. Today pt reports she is still taking the lisinopril HCT but then around July 2 she started having elevations again. Was feeling fatigued and was sleeping a lot. Is the anniverary of her son's death. Is tearful again. Is stressed at work. Had her BP checked there and has been very high; sent home. Has been 146/104 and 151/97. On discussion, she is having sexual side effects.     2. PSYCH- depression with anxiety. At her initial visit pt reported that her son was shot and killed in 2011. She had not felt well since then. Was sad, irritable and anxious with excessive guilt, anhedonia, feeling overwhelmed, fatigue, withdrawing, decreased motivation, crying, some concentration/ memory problems, some feeling hopeless/ helpless. Responded to Cymbalta but this was changed to Effexor due to cost. Was given the addition of Zyprexa and reached goal on Effexor and Zyprexa by last visit 12/11/17. Was drinking 1 glass wine a day.     3. GI- IBS-diarrhea, hx stool incontinence. KUB neg. Pt improved when she stopped drinking beer. Was also advised to reduce gluten  4. RESP- asthma, allergies. Diagnosed 10/4/17. Pt smokes <1ppd.Has had seasonal issues, treated with Flonase and rescue inhaler.      The following portions of the patient's history were  "reviewed and updated as appropriate: current medications, past family history, past medical history, past social history, past surgical history and problem list.    Review of Systems   Cardiovascular: Negative for chest pain.   Gastrointestinal: Negative for abdominal distention and abdominal pain.   Skin: Negative for color change.   Neurological: Negative for tremors, speech difficulty and headaches.   Psychiatric/Behavioral: Negative for agitation and confusion.   All other systems reviewed and are negative.        Current Outpatient Prescriptions:   •  albuterol (PROVENTIL HFA;VENTOLIN HFA) 108 (90 Base) MCG/ACT inhaler, 2 puffs q4-6 hr prn cough, shortness of breath or wheezing, Disp: 18 g, Rfl: 0  •  ARIPiprazole (ABILIFY) 10 MG tablet, Take 1 tablet by mouth Daily., Disp: 30 tablet, Rfl: 0  •  busPIRone (BUSPAR) 10 MG tablet, take 1/2 tablet by mouth every 8 hours if needed for anxiety then 1 tablet at bedtime if needed for sleep, Disp: 30 tablet, Rfl: 0  •  fluticasone (FLONASE) 50 MCG/ACT nasal spray, 2 sprays into each nostril Daily., Disp: 16 g, Rfl: 0  •  lisinopril-hydrochlorothiazide (PRINZIDE,ZESTORETIC) 10-12.5 MG per tablet, Take 1 tablet by mouth Daily., Disp: 30 tablet, Rfl: 0  •  venlafaxine XR (EFFEXOR-XR) 75 MG 24 hr capsule, Take 1 capsule by mouth Daily., Disp: 30 capsule, Rfl: 6    Objective     /88   Pulse 74   Temp 97.8 °F (36.6 °C)   Ht 160 cm (63\")   Wt 59.5 kg (131 lb 3.2 oz)   SpO2 99%   BMI 23.24 kg/m²     Physical Exam   Constitutional: She is oriented to person, place, and time. She appears well-developed and well-nourished.   HENT:   Right Ear: Tympanic membrane and ear canal normal.   Left Ear: Tympanic membrane and ear canal normal.   Mouth/Throat: Oropharynx is clear and moist.   Eyes: Conjunctivae and EOM are normal. Pupils are equal, round, and reactive to light.   Neck: No thyromegaly present.   Cardiovascular: Normal rate and regular rhythm.    Pulmonary/Chest: " Effort normal and breath sounds normal.   Neurological: She is alert and oriented to person, place, and time.   Skin: Skin is warm and dry.   Psychiatric: She has a normal mood and affect.   Vitals reviewed.      Assessment/Plan   Jamaica was seen today for hypertension.    Diagnoses and all orders for this visit:    Essential hypertension  -     lisinopril-hydrochlorothiazide (PRINZIDE,ZESTORETIC) 10-12.5 MG per tablet; Take 1 tablet by mouth Daily.    Depression with anxiety  -     venlafaxine XR (EFFEXOR-XR) 75 MG 24 hr capsule; Take 1 capsule by mouth Daily.  -     ARIPiprazole (ABILIFY) 10 MG tablet; Take 1 tablet by mouth Daily.        1. CV- HTN- reassured that her pressure is not high enough to be of immediate concern. Will address her mood and then reassess her BP.  2. PSYCH- depression with anxiety- mood relapsing and pt having side effects. Will continue the efffexor 75 and change the zyprexa to abilify and increase the dose to 10mg.  3. RECHECK- 1mo routine (pt needs CPE in 7mo)

## 2018-08-20 DIAGNOSIS — F41.8 DEPRESSION WITH ANXIETY: ICD-10-CM

## 2018-08-20 DIAGNOSIS — I10 ESSENTIAL HYPERTENSION: ICD-10-CM

## 2018-08-20 RX ORDER — ARIPIPRAZOLE 10 MG/1
TABLET ORAL
Qty: 30 TABLET | Refills: 0 | OUTPATIENT
Start: 2018-08-20

## 2018-08-20 RX ORDER — LISINOPRIL AND HYDROCHLOROTHIAZIDE 12.5; 1 MG/1; MG/1
TABLET ORAL
Qty: 30 TABLET | Refills: 0 | OUTPATIENT
Start: 2018-08-20

## 2018-08-31 DIAGNOSIS — I10 ESSENTIAL HYPERTENSION: ICD-10-CM

## 2018-08-31 RX ORDER — LISINOPRIL AND HYDROCHLOROTHIAZIDE 12.5; 1 MG/1; MG/1
TABLET ORAL
Qty: 30 TABLET | Refills: 0 | OUTPATIENT
Start: 2018-08-31

## 2018-09-15 DIAGNOSIS — I10 ESSENTIAL HYPERTENSION: ICD-10-CM

## 2018-09-15 DIAGNOSIS — F41.8 DEPRESSION WITH ANXIETY: ICD-10-CM

## 2018-09-17 RX ORDER — ARIPIPRAZOLE 10 MG/1
TABLET ORAL
Qty: 30 TABLET | Refills: 0 | Status: SHIPPED | OUTPATIENT
Start: 2018-09-17 | End: 2018-10-12 | Stop reason: SDUPTHER

## 2018-09-17 RX ORDER — LISINOPRIL AND HYDROCHLOROTHIAZIDE 12.5; 1 MG/1; MG/1
TABLET ORAL
Qty: 30 TABLET | Refills: 0 | Status: SHIPPED | OUTPATIENT
Start: 2018-09-17 | End: 2018-10-12 | Stop reason: SDUPTHER

## 2018-10-12 ENCOUNTER — TELEPHONE (OUTPATIENT)
Dept: INTERNAL MEDICINE | Facility: CLINIC | Age: 53
End: 2018-10-12

## 2018-10-12 DIAGNOSIS — I10 ESSENTIAL HYPERTENSION: ICD-10-CM

## 2018-10-12 DIAGNOSIS — F41.8 DEPRESSION WITH ANXIETY: ICD-10-CM

## 2018-10-12 RX ORDER — ARIPIPRAZOLE 10 MG/1
10 TABLET ORAL DAILY
Qty: 30 TABLET | Refills: 0 | Status: SHIPPED | OUTPATIENT
Start: 2018-10-12 | End: 2018-12-18

## 2018-10-12 RX ORDER — LISINOPRIL AND HYDROCHLOROTHIAZIDE 12.5; 1 MG/1; MG/1
1 TABLET ORAL DAILY
Qty: 30 TABLET | Refills: 0 | Status: SHIPPED | OUTPATIENT
Start: 2018-10-12 | End: 2018-12-11 | Stop reason: SDUPTHER

## 2018-10-12 RX ORDER — VENLAFAXINE HYDROCHLORIDE 75 MG/1
75 CAPSULE, EXTENDED RELEASE ORAL DAILY
Qty: 30 CAPSULE | Refills: 6 | Status: SHIPPED | OUTPATIENT
Start: 2018-10-12 | End: 2018-12-18 | Stop reason: SDUPTHER

## 2018-10-12 NOTE — TELEPHONE ENCOUNTER
Patient called needing a refill for lisinopril; abilify; and venlafaxin. Patient wanted to know if you would refill until her next appointment in November? Patient said she missed last appointment because she lost her job and insurance, but now has [employer].

## 2018-12-06 ENCOUNTER — TELEPHONE (OUTPATIENT)
Dept: INTERNAL MEDICINE | Facility: CLINIC | Age: 53
End: 2018-12-06

## 2018-12-06 NOTE — TELEPHONE ENCOUNTER
Patient is needig a refill for effexor xr; and lisinopril. Patient also wanted to speak with you about her foot being swollen. Please give pt a call

## 2018-12-06 NOTE — TELEPHONE ENCOUNTER
Please offer the pt the open appt spot in the morning. I cannot refill any medications until she is seen and we can address her swollen foot as well.

## 2018-12-18 ENCOUNTER — OFFICE VISIT (OUTPATIENT)
Dept: INTERNAL MEDICINE | Facility: CLINIC | Age: 53
End: 2018-12-18

## 2018-12-18 VITALS
HEIGHT: 63 IN | BODY MASS INDEX: 24.84 KG/M2 | SYSTOLIC BLOOD PRESSURE: 128 MMHG | WEIGHT: 140.2 LBS | DIASTOLIC BLOOD PRESSURE: 88 MMHG | TEMPERATURE: 97.6 F

## 2018-12-18 DIAGNOSIS — I10 ESSENTIAL HYPERTENSION: Primary | ICD-10-CM

## 2018-12-18 DIAGNOSIS — F41.8 DEPRESSION WITH ANXIETY: ICD-10-CM

## 2018-12-18 LAB
25(OH)D3 SERPL-MCNC: 6.8 NG/ML
ALBUMIN SERPL-MCNC: 4.83 G/DL (ref 3.2–4.8)
ALBUMIN/GLOB SERPL: 2.2 G/DL (ref 1.5–2.5)
ALP SERPL-CCNC: 107 U/L (ref 25–100)
ALT SERPL W P-5'-P-CCNC: 22 U/L (ref 7–40)
ANION GAP SERPL CALCULATED.3IONS-SCNC: 10 MMOL/L (ref 3–11)
ARTICHOKE IGE QN: 220 MG/DL (ref 0–130)
AST SERPL-CCNC: 17 U/L (ref 0–33)
BASOPHILS # BLD AUTO: 0.01 10*3/MM3 (ref 0–0.2)
BASOPHILS NFR BLD AUTO: 0.2 % (ref 0–1)
BILIRUB SERPL-MCNC: 0.4 MG/DL (ref 0.3–1.2)
BUN BLD-MCNC: 9 MG/DL (ref 9–23)
BUN/CREAT SERPL: 12.3 (ref 7–25)
CALCIUM SPEC-SCNC: 10.3 MG/DL (ref 8.7–10.4)
CHLORIDE SERPL-SCNC: 103 MMOL/L (ref 99–109)
CHOLEST SERPL-MCNC: 334 MG/DL (ref 0–200)
CO2 SERPL-SCNC: 26 MMOL/L (ref 20–31)
CREAT BLD-MCNC: 0.73 MG/DL (ref 0.6–1.3)
DEPRECATED RDW RBC AUTO: 44.8 FL (ref 37–54)
EOSINOPHIL # BLD AUTO: 0.02 10*3/MM3 (ref 0–0.3)
EOSINOPHIL NFR BLD AUTO: 0.3 % (ref 0–3)
ERYTHROCYTE [DISTWIDTH] IN BLOOD BY AUTOMATED COUNT: 14.8 % (ref 11.3–14.5)
GFR SERPL CREATININE-BSD FRML MDRD: 102 ML/MIN/1.73
GLOBULIN UR ELPH-MCNC: 2.2 GM/DL
GLUCOSE BLD-MCNC: 79 MG/DL (ref 70–100)
HCT VFR BLD AUTO: 44.7 % (ref 34.5–44)
HDLC SERPL-MCNC: 81 MG/DL (ref 40–60)
HGB BLD-MCNC: 14.4 G/DL (ref 11.5–15.5)
IMM GRANULOCYTES # BLD: 0.01 10*3/MM3 (ref 0–0.03)
IMM GRANULOCYTES NFR BLD: 0.2 % (ref 0–0.6)
LYMPHOCYTES # BLD AUTO: 1.82 10*3/MM3 (ref 0.6–4.8)
LYMPHOCYTES NFR BLD AUTO: 29.7 % (ref 24–44)
MCH RBC QN AUTO: 26.9 PG (ref 27–31)
MCHC RBC AUTO-ENTMCNC: 32.2 G/DL (ref 32–36)
MCV RBC AUTO: 83.6 FL (ref 80–99)
MONOCYTES # BLD AUTO: 0.45 10*3/MM3 (ref 0–1)
MONOCYTES NFR BLD AUTO: 7.4 % (ref 0–12)
NEUTROPHILS # BLD AUTO: 3.81 10*3/MM3 (ref 1.5–8.3)
NEUTROPHILS NFR BLD AUTO: 62.2 % (ref 41–71)
PLATELET # BLD AUTO: 350 10*3/MM3 (ref 150–450)
PMV BLD AUTO: 9.8 FL (ref 6–12)
POTASSIUM BLD-SCNC: 4.4 MMOL/L (ref 3.5–5.5)
PROT SERPL-MCNC: 7 G/DL (ref 5.7–8.2)
RBC # BLD AUTO: 5.35 10*6/MM3 (ref 3.89–5.14)
SODIUM BLD-SCNC: 139 MMOL/L (ref 132–146)
TRIGL SERPL-MCNC: 517 MG/DL (ref 0–150)
TSH SERPL DL<=0.05 MIU/L-ACNC: 0.59 MIU/ML (ref 0.35–5.35)
VIT B12 BLD-MCNC: 1002 PG/ML (ref 211–911)
WBC NRBC COR # BLD: 6.12 10*3/MM3 (ref 3.5–10.8)

## 2018-12-18 PROCEDURE — 80061 LIPID PANEL: CPT | Performed by: FAMILY MEDICINE

## 2018-12-18 PROCEDURE — 85025 COMPLETE CBC W/AUTO DIFF WBC: CPT | Performed by: FAMILY MEDICINE

## 2018-12-18 PROCEDURE — 82607 VITAMIN B-12: CPT | Performed by: FAMILY MEDICINE

## 2018-12-18 PROCEDURE — 84443 ASSAY THYROID STIM HORMONE: CPT | Performed by: FAMILY MEDICINE

## 2018-12-18 PROCEDURE — 80053 COMPREHEN METABOLIC PANEL: CPT | Performed by: FAMILY MEDICINE

## 2018-12-18 PROCEDURE — 82306 VITAMIN D 25 HYDROXY: CPT | Performed by: FAMILY MEDICINE

## 2018-12-18 PROCEDURE — 99214 OFFICE O/P EST MOD 30 MIN: CPT | Performed by: FAMILY MEDICINE

## 2018-12-18 RX ORDER — LISINOPRIL AND HYDROCHLOROTHIAZIDE 12.5; 1 MG/1; MG/1
1 TABLET ORAL DAILY
Qty: 30 TABLET | Refills: 0 | Status: SHIPPED | OUTPATIENT
Start: 2018-12-18 | End: 2019-01-23 | Stop reason: SDUPTHER

## 2018-12-18 RX ORDER — VENLAFAXINE HYDROCHLORIDE 75 MG/1
75 CAPSULE, EXTENDED RELEASE ORAL DAILY
Qty: 30 CAPSULE | Refills: 6 | Status: SHIPPED | OUTPATIENT
Start: 2018-12-18 | End: 2019-04-30 | Stop reason: SDUPTHER

## 2018-12-18 NOTE — PATIENT INSTRUCTIONS
1. CV- HTN- discussed that the swelling is part of her high BP. Will continue on lisinopril HCT now with RF x1 and will get her labs done today. Will do additional RF based on labs. Discussed that treating her BP is very important for her overall health. Even if not able to go to the doctor regularly, she needs [facility] at [facility] to stay on the lisinopril HCT.   2. PSYCH- mood- will continue on effexor and then reassess her mood in 1mo along with recheck of BP recheck  2. RECHECK- 1mo

## 2018-12-18 NOTE — PROGRESS NOTES
Subjective   Jamaica Shell is a 53 y.o. female.     History of Present Illness   Here for leg swelling Did not keep the 1mo recheck from her last work in. Pt was seen 7/9/18 as a work in for BP concerns. Pt cancelled appt 3/2018 for CPE; otherwise last seen 12/11/17 for 2mo recheck mood and asthma. Pt was seen 10/25/16 as a new patient. Lab 10/25/16 demo normal CBC, CMP, TSH, B12 and lipid with , tg 253, , . Had vit D 12.8 and was advised 5000 IU qd. Had baseline EKG 10/25/16 with ?LVH vs normal variant.     1.CV- HTN. Pt diagnosed 2011. Was treated with Lisinopril but had been out of meds x2yr with no insurance. No known cardio sequelea. Was started on Lisinopril HCT and did well. BP still elevated on occasion related to stress, but was transient. At visit 7/9/18 she was having elevations (146/104 and 151/97) related to stress (work and the annivesary of her son's death). Was advised we would address her mood and then reassess. Pt then called reporting swollen feet and was advised appointment. Today she reports this is already better. Has had swelling on a couple of occasions; last was 1wk ago. Was given 7 days of BP meds and it resolved.       2. PSYCH- depression with anxiety. At her initial visit pt reported that her son was shot and killed in 2011. She had not felt well since then. Was sad, irritable and anxious with excessive guilt, anhedonia, feeling overwhelmed, fatigue, withdrawing, decreased motivation, crying, some concentration/ memory problems, some feeling hopeless/ helpless. Responded to Cymbalta but this was changed to Effexor due to cost. Was given the addition of Zyprexa and reached goal on Effexor and Zyprexa by visit 12/11/17. However, at her last visit 7/9/18 she was relapsing with tearfulness, etc. Was also having sexual S/E. Zyprexa was changed to abilify 10 but did not keep the recheck. Today pt reports she has stayed on the effexor by calling for RF.    3. GI-  "IBS-diarrhea, hx stool incontinence. KUB neg. Pt improved when she stopped drinking beer. Was also advised to reduce gluten  4. RESP- asthma, allergies. Diagnosed 10/4/17. Pt smokes <1ppd.Has had seasonal issues, treated with Flonase and rescue inhaler.      The following portions of the patient's history were reviewed and updated as appropriate: current medications, past family history, past medical history, past social history, past surgical history and problem list.    Review of Systems   Cardiovascular: Positive for leg swelling. Negative for chest pain.   Gastrointestinal: Negative for abdominal distention and abdominal pain.   Skin: Negative for color change.   Neurological: Negative for tremors, speech difficulty and headaches.   Psychiatric/Behavioral: Negative for agitation and confusion.   All other systems reviewed and are negative.        Current Outpatient Medications:   •  albuterol (PROVENTIL HFA;VENTOLIN HFA) 108 (90 Base) MCG/ACT inhaler, 2 puffs q4-6 hr prn cough, shortness of breath or wheezing, Disp: 18 g, Rfl: 0  •  busPIRone (BUSPAR) 10 MG tablet, take 1/2 tablet by mouth every 8 hours if needed for anxiety then 1 tablet at bedtime if needed for sleep, Disp: 30 tablet, Rfl: 0  •  fluticasone (FLONASE) 50 MCG/ACT nasal spray, 2 sprays into each nostril Daily., Disp: 16 g, Rfl: 0  •  lisinopril-hydrochlorothiazide (PRINZIDE,ZESTORETIC) 10-12.5 MG per tablet, Take 1 tablet by mouth Daily., Disp: 30 tablet, Rfl: 0  •  venlafaxine XR (EFFEXOR-XR) 75 MG 24 hr capsule, Take 1 capsule by mouth Daily., Disp: 30 capsule, Rfl: 6    Objective     /88   Temp 97.6 °F (36.4 °C)   Ht 160 cm (63\")   Wt 63.6 kg (140 lb 3.2 oz)   BMI 24.84 kg/m²     Physical Exam   Constitutional: She is oriented to person, place, and time. She appears well-developed and well-nourished.   HENT:   Right Ear: Tympanic membrane and ear canal normal.   Left Ear: Tympanic membrane and ear canal normal.   Mouth/Throat: " Oropharynx is clear and moist.   Eyes: Conjunctivae and EOM are normal. Pupils are equal, round, and reactive to light.   Neck: No thyromegaly present.   Cardiovascular: Normal rate and regular rhythm.   Pulmonary/Chest: Effort normal and breath sounds normal.   Neurological: She is alert and oriented to person, place, and time.   Skin: Skin is warm and dry.   Psychiatric: She has a normal mood and affect.   Vitals reviewed.      Assessment/Plan   Jamaica was seen today for edema.    Diagnoses and all orders for this visit:    Essential hypertension  -     lisinopril-hydrochlorothiazide (PRINZIDE,ZESTORETIC) 10-12.5 MG per tablet; Take 1 tablet by mouth Daily.  -     CBC & Differential  -     Comprehensive Metabolic Panel  -     Lipid Panel  -     Vitamin B12  -     Vitamin D 25 Hydroxy  -     TSH    Depression with anxiety  -     venlafaxine XR (EFFEXOR-XR) 75 MG 24 hr capsule; Take 1 capsule by mouth Daily.      1. CV- HTN- discussed that the swelling is part of her high BP. Will continue on lisinopril HCT now with RF x1 and will get her labs done today. Will do additional RF based on labs. Discussed that treating her BP is very important for her overall health. Even if not able to go to the doctor regularly, she needs [facility] at [facility] to stay on the lisinopril HCT.   2. PSYCH- mood- will continue on effexor and then reassess her mood in 1mo along with recheck of BP recheck  2. RECHECK- 1mo

## 2019-01-23 ENCOUNTER — OFFICE VISIT (OUTPATIENT)
Dept: INTERNAL MEDICINE | Facility: CLINIC | Age: 54
End: 2019-01-23

## 2019-01-23 VITALS
SYSTOLIC BLOOD PRESSURE: 122 MMHG | WEIGHT: 139.6 LBS | BODY MASS INDEX: 24.73 KG/M2 | DIASTOLIC BLOOD PRESSURE: 84 MMHG | HEIGHT: 63 IN | TEMPERATURE: 97.6 F

## 2019-01-23 DIAGNOSIS — F41.8 DEPRESSION WITH ANXIETY: ICD-10-CM

## 2019-01-23 DIAGNOSIS — E78.00 PURE HYPERCHOLESTEROLEMIA: ICD-10-CM

## 2019-01-23 DIAGNOSIS — I10 ESSENTIAL HYPERTENSION: Primary | ICD-10-CM

## 2019-01-23 PROCEDURE — 99214 OFFICE O/P EST MOD 30 MIN: CPT | Performed by: FAMILY MEDICINE

## 2019-01-23 RX ORDER — ARIPIPRAZOLE 10 MG/1
TABLET ORAL
Qty: 30 TABLET | Refills: 0 | Status: SHIPPED | OUTPATIENT
Start: 2019-01-23 | End: 2019-05-20

## 2019-01-23 RX ORDER — LISINOPRIL AND HYDROCHLOROTHIAZIDE 12.5; 1 MG/1; MG/1
1 TABLET ORAL DAILY
Qty: 30 TABLET | Refills: 0 | Status: SHIPPED | OUTPATIENT
Start: 2019-01-23 | End: 2019-03-17 | Stop reason: SDUPTHER

## 2019-01-23 NOTE — PROGRESS NOTES
Subjective   Jamaica Shell is a 53 y.o. female.     History of Present Illness   Here for 1mo recheck HTN and mood and discuss cholesterol. Last seen 12/18/18 for HTN with edema. Pt has been intermittent with f/u. Was seen 7/9/18 as a work in for BP concerns; did not keep the 1mo recheck. Cacelled appt 3/2018 for CPE; otherwise last seen 12/11/17 for 2mo recheck mood and asthma. Pt was seen 10/25/16 as a new patient. Lab 12/18/18 demo normal CBC, CMP, TSH and B12. Had vit D 6.8; advised 5k. Had lipid with , tg 517, HDL 81, . Lab 10/25/16 demo , tg 253, , ; vit D 12.8 and was advised 5000 IU qd. Had baseline EKG 10/25/16 with ?LVH vs normal variant.     1.CV- HTN, hyperlipid. HTN diagnosed 2011. Pt was treated with Lisinopril but then lost her insurance and had been out of meds x2yr when seen here 10/25/16. No known cardio sequelea. Was started on Lisinopril HCT and did well. BP still elevated on occasion related to stress, but was transient. Worse at recheck related to work and the annivesary of her son's death. Was advised we would address her mood and then reassess. Pt did not keep recheck until 12/18/18 and had run out of meds with associated pedal edema. Had called and been given 7 days meds until appointment and swelling was already resolved at visit. Pt then had labs and these were fine except for very elevated lipid (new from 2016). Needs discussion. Today pt reports no known family hx high cholesterol.        2. PSYCH- depression with anxiety. At her initial visit pt reported that her son was shot and killed in 2011. She had not felt well since then. Was sad, irritable and anxious with excessive guilt, anhedonia, feeling overwhelmed, fatigue, withdrawing, decreased motivation, crying, some concentration/ memory problems, some feeling hopeless/ helpless. Responded to Cymbalta but this was changed to Effexor due to cost. Was given the addition of Zyprexa and reached goal on  Effexor and Zyprexa by visit 12/11/17. However, at her last visit 7/9/18 she was relapsing with tearfulness, etc. Was also having sexual S/E. Zyprexa was changed to abilify 10 but did not keep the recheck. At her visit 12/18/18 she was still on just Effexor. Needs further discussion today. Today pt reports she is not sure how she is doing on just effexor as she is having a lot of stress in her life. Is crying a lot but feels this relates to the situation. Is also getting hot flashes again. LMP 3yr ago. Does not remember any S/E with abilify.      3. GI- IBS-diarrhea, hx stool incontinence. KUB neg. Pt improved when she stopped drinking beer. Was also advised to reduce gluten  4. RESP- asthma, allergies. Diagnosed 10/4/17. Pt smokes <1ppd.Has had seasonal issues, treated with Flonase and rescue inhaler.      The following portions of the patient's history were reviewed and updated as appropriate: current medications, past family history, past medical history, past social history, past surgical history and problem list.    Review of Systems   Cardiovascular: Negative for chest pain.   Gastrointestinal: Negative for abdominal distention and abdominal pain.   Skin: Negative for color change.   Neurological: Negative for tremors, speech difficulty and headaches.   Psychiatric/Behavioral: Negative for agitation and confusion.   All other systems reviewed and are negative.        Current Outpatient Medications:   •  albuterol (PROVENTIL HFA;VENTOLIN HFA) 108 (90 Base) MCG/ACT inhaler, 2 puffs q4-6 hr prn cough, shortness of breath or wheezing, Disp: 18 g, Rfl: 0  •  ARIPiprazole (ABILIFY) 10 MG tablet, Take 1/2 tab po qd x6 days then increase to 1 tab po qhs, Disp: 30 tablet, Rfl: 0  •  busPIRone (BUSPAR) 10 MG tablet, take 1/2 tablet by mouth every 8 hours if needed for anxiety then 1 tablet at bedtime if needed for sleep, Disp: 30 tablet, Rfl: 0  •  fluticasone (FLONASE) 50 MCG/ACT nasal spray, 2 sprays into each nostril  "Daily., Disp: 16 g, Rfl: 0  •  lisinopril-hydrochlorothiazide (PRINZIDE,ZESTORETIC) 10-12.5 MG per tablet, Take 1 tablet by mouth Daily., Disp: 30 tablet, Rfl: 0  •  venlafaxine XR (EFFEXOR-XR) 75 MG 24 hr capsule, Take 1 capsule by mouth Daily., Disp: 30 capsule, Rfl: 6    Objective     /84   Temp 97.6 °F (36.4 °C)   Ht 160 cm (63\")   Wt 63.3 kg (139 lb 9.6 oz)   BMI 24.73 kg/m²     Physical Exam   Constitutional: She is oriented to person, place, and time. She appears well-developed and well-nourished.   HENT:   Right Ear: Tympanic membrane and ear canal normal.   Left Ear: Tympanic membrane and ear canal normal.   Mouth/Throat: Oropharynx is clear and moist.   Eyes: Conjunctivae and EOM are normal. Pupils are equal, round, and reactive to light.   Neck: No thyromegaly present.   Cardiovascular: Normal rate and regular rhythm.   Pulmonary/Chest: Effort normal and breath sounds normal.   Neurological: She is alert and oriented to person, place, and time.   Skin: Skin is warm and dry.   Psychiatric: She has a normal mood and affect.   Vitals reviewed.      Assessment/Plan   Jamaica was seen today for follow-up.    Diagnoses and all orders for this visit:    Essential hypertension  -     lisinopril-hydrochlorothiazide (PRINZIDE,ZESTORETIC) 10-12.5 MG per tablet; Take 1 tablet by mouth Daily.    Pure hypercholesterolemia    Depression with anxiety  -     ARIPiprazole (ABILIFY) 10 MG tablet; Take 1/2 tab po qd x6 days then increase to 1 tab po qhs        1. CV- HTN, hyperlipid- BP at goal. RF lisinopril HCT x6mo. Discussed the pathophysiology of high LDL cholesterol. Discussed that her goal LDL is around 130 and she was 122 just 2yr ago. To work on diet and then we will recheck and treat with medicine if not significantly improved.   2. PSYCH-depression with anxiety- discussed that the hot flashes are more likely related to mood than menopause. Will continue the effexor and add ablify. Pt will start at 1/2 " tab (5mg) for the first 6 days and then go to a full tab (10mg).  3. RECHECK- 1mo fasting (lipid, mood)

## 2019-01-23 NOTE — PATIENT INSTRUCTIONS
1. CV- HTN, hyperlipid- BP at goal. RF lisinopril HCT x6mo. Discussed the pathophysiology of high LDL cholesterol. Discussed that her goal LDL is around 130 and she was 122 just 2yr ago. To work on diet and then we will recheck and treat with medicine if not significantly improved.   2. PSYCH-depression with anxiety- discussed that the hot flashes are more likely related to mood than menopause. Will continue the effexor and add ablify. Pt will start at 1/2 tab (5mg) for the first 6 days and then go to a full tab (10mg).  3. RECHECK- 1mo fasting (lipid, mood)

## 2019-02-21 ENCOUNTER — TELEPHONE (OUTPATIENT)
Dept: INTERNAL MEDICINE | Facility: CLINIC | Age: 54
End: 2019-02-21

## 2019-02-21 NOTE — TELEPHONE ENCOUNTER
Pt called and had to cancel her appt, I told her first available morning wasn't until 3/21.     Pt stated that Dr. Amador wanted to see her sooner.    She said if we could change her appt to a sooner morning to just give her a call.      Thank you,

## 2019-03-17 DIAGNOSIS — I10 ESSENTIAL HYPERTENSION: ICD-10-CM

## 2019-03-18 RX ORDER — LISINOPRIL AND HYDROCHLOROTHIAZIDE 12.5; 1 MG/1; MG/1
TABLET ORAL
Qty: 30 TABLET | Refills: 0 | Status: SHIPPED | OUTPATIENT
Start: 2019-03-18 | End: 2019-04-30 | Stop reason: SDUPTHER

## 2019-04-30 ENCOUNTER — TELEPHONE (OUTPATIENT)
Dept: INTERNAL MEDICINE | Facility: CLINIC | Age: 54
End: 2019-04-30

## 2019-04-30 DIAGNOSIS — I10 ESSENTIAL HYPERTENSION: ICD-10-CM

## 2019-04-30 DIAGNOSIS — F41.8 DEPRESSION WITH ANXIETY: ICD-10-CM

## 2019-04-30 RX ORDER — LISINOPRIL AND HYDROCHLOROTHIAZIDE 12.5; 1 MG/1; MG/1
1 TABLET ORAL DAILY
Qty: 30 TABLET | Refills: 0 | Status: SHIPPED | OUTPATIENT
Start: 2019-04-30 | End: 2019-05-20 | Stop reason: SDUPTHER

## 2019-04-30 RX ORDER — VENLAFAXINE HYDROCHLORIDE 75 MG/1
75 CAPSULE, EXTENDED RELEASE ORAL DAILY
Qty: 30 CAPSULE | Refills: 0 | Status: SHIPPED | OUTPATIENT
Start: 2019-04-30 | End: 2019-05-20 | Stop reason: SDUPTHER

## 2019-05-20 ENCOUNTER — OFFICE VISIT (OUTPATIENT)
Dept: INTERNAL MEDICINE | Facility: CLINIC | Age: 54
End: 2019-05-20

## 2019-05-20 VITALS
HEIGHT: 63 IN | SYSTOLIC BLOOD PRESSURE: 140 MMHG | OXYGEN SATURATION: 98 % | DIASTOLIC BLOOD PRESSURE: 76 MMHG | TEMPERATURE: 98.1 F | HEART RATE: 103 BPM | RESPIRATION RATE: 18 BRPM | BODY MASS INDEX: 26.29 KG/M2 | WEIGHT: 148.38 LBS

## 2019-05-20 DIAGNOSIS — R20.2 NUMBNESS AND TINGLING IN LEFT ARM: ICD-10-CM

## 2019-05-20 DIAGNOSIS — R07.89 CHEST TIGHTNESS OR PRESSURE: ICD-10-CM

## 2019-05-20 DIAGNOSIS — I10 ESSENTIAL HYPERTENSION: ICD-10-CM

## 2019-05-20 DIAGNOSIS — Z13.29 THYROID DISORDER SCREENING: ICD-10-CM

## 2019-05-20 DIAGNOSIS — M54.2 NECK PAIN: Primary | ICD-10-CM

## 2019-05-20 DIAGNOSIS — R20.0 NUMBNESS AND TINGLING IN LEFT ARM: ICD-10-CM

## 2019-05-20 DIAGNOSIS — H00.15 CHALAZION OF LEFT LOWER EYELID: ICD-10-CM

## 2019-05-20 DIAGNOSIS — E78.00 HYPERCHOLESTEREMIA: ICD-10-CM

## 2019-05-20 DIAGNOSIS — F41.8 DEPRESSION WITH ANXIETY: ICD-10-CM

## 2019-05-20 DIAGNOSIS — Z13.21 ENCOUNTER FOR VITAMIN DEFICIENCY SCREENING: ICD-10-CM

## 2019-05-20 RX ORDER — VENLAFAXINE HYDROCHLORIDE 75 MG/1
75 CAPSULE, EXTENDED RELEASE ORAL DAILY
Qty: 30 CAPSULE | Refills: 5 | Status: SHIPPED | OUTPATIENT
Start: 2019-05-20 | End: 2020-02-04 | Stop reason: SDUPTHER

## 2019-05-20 RX ORDER — LISINOPRIL AND HYDROCHLOROTHIAZIDE 12.5; 1 MG/1; MG/1
1 TABLET ORAL DAILY
Qty: 30 TABLET | Refills: 5 | Status: SHIPPED | OUTPATIENT
Start: 2019-05-20 | End: 2020-02-25 | Stop reason: SDUPTHER

## 2019-05-21 ENCOUNTER — LAB (OUTPATIENT)
Dept: INTERNAL MEDICINE | Facility: CLINIC | Age: 54
End: 2019-05-21

## 2019-05-21 DIAGNOSIS — Z13.29 THYROID DISORDER SCREENING: ICD-10-CM

## 2019-05-21 DIAGNOSIS — I10 ESSENTIAL HYPERTENSION: ICD-10-CM

## 2019-05-21 DIAGNOSIS — R07.89 CHEST TIGHTNESS OR PRESSURE: ICD-10-CM

## 2019-05-21 DIAGNOSIS — F41.8 DEPRESSION WITH ANXIETY: ICD-10-CM

## 2019-05-21 DIAGNOSIS — Z13.21 ENCOUNTER FOR VITAMIN DEFICIENCY SCREENING: ICD-10-CM

## 2019-05-21 DIAGNOSIS — E78.00 HYPERCHOLESTEREMIA: ICD-10-CM

## 2019-05-21 DIAGNOSIS — E87.6 HYPOKALEMIA: Primary | ICD-10-CM

## 2019-05-21 DIAGNOSIS — R20.0 NUMBNESS AND TINGLING IN LEFT ARM: ICD-10-CM

## 2019-05-21 DIAGNOSIS — R20.2 NUMBNESS AND TINGLING IN LEFT ARM: ICD-10-CM

## 2019-05-21 LAB
ALBUMIN SERPL-MCNC: 4.6 G/DL (ref 3.5–5.2)
ALBUMIN/GLOB SERPL: 1.8 G/DL
ALP SERPL-CCNC: 99 U/L (ref 39–117)
ALT SERPL W P-5'-P-CCNC: 15 U/L (ref 1–33)
ANION GAP SERPL CALCULATED.3IONS-SCNC: 14.6 MMOL/L
AST SERPL-CCNC: 17 U/L (ref 1–32)
BASOPHILS # BLD AUTO: 0.02 10*3/MM3 (ref 0–0.2)
BASOPHILS NFR BLD AUTO: 0.3 % (ref 0–1.5)
BILIRUB SERPL-MCNC: 0.6 MG/DL (ref 0.2–1.2)
BUN BLD-MCNC: 10 MG/DL (ref 6–20)
BUN/CREAT SERPL: 12.3 (ref 7–25)
CALCIUM SPEC-SCNC: 9.5 MG/DL (ref 8.6–10.5)
CHLORIDE SERPL-SCNC: 100 MMOL/L (ref 98–107)
CHOLEST SERPL-MCNC: 263 MG/DL (ref 0–200)
CO2 SERPL-SCNC: 24.4 MMOL/L (ref 22–29)
CREAT BLD-MCNC: 0.81 MG/DL (ref 0.57–1)
DEPRECATED RDW RBC AUTO: 43.6 FL (ref 37–54)
EOSINOPHIL # BLD AUTO: 0.06 10*3/MM3 (ref 0–0.4)
EOSINOPHIL NFR BLD AUTO: 0.8 % (ref 0.3–6.2)
ERYTHROCYTE [DISTWIDTH] IN BLOOD BY AUTOMATED COUNT: 14.4 % (ref 12.3–15.4)
FOLATE SERPL-MCNC: 11.9 NG/ML (ref 4.78–24.2)
GFR SERPL CREATININE-BSD FRML MDRD: 90 ML/MIN/1.73
GLOBULIN UR ELPH-MCNC: 2.5 GM/DL
GLUCOSE BLD-MCNC: 80 MG/DL (ref 65–99)
HCT VFR BLD AUTO: 42.1 % (ref 34–46.6)
HDLC SERPL-MCNC: 81 MG/DL (ref 40–60)
HGB BLD-MCNC: 13 G/DL (ref 12–15.9)
IMM GRANULOCYTES # BLD AUTO: 0.03 10*3/MM3 (ref 0–0.05)
IMM GRANULOCYTES NFR BLD AUTO: 0.4 % (ref 0–0.5)
LDLC SERPL CALC-MCNC: 145 MG/DL (ref 0–100)
LDLC/HDLC SERPL: 1.79 {RATIO}
LYMPHOCYTES # BLD AUTO: 2.5 10*3/MM3 (ref 0.7–3.1)
LYMPHOCYTES NFR BLD AUTO: 31.8 % (ref 19.6–45.3)
MCH RBC QN AUTO: 25.9 PG (ref 26.6–33)
MCHC RBC AUTO-ENTMCNC: 30.9 G/DL (ref 31.5–35.7)
MCV RBC AUTO: 84 FL (ref 79–97)
MONOCYTES # BLD AUTO: 0.51 10*3/MM3 (ref 0.1–0.9)
MONOCYTES NFR BLD AUTO: 6.5 % (ref 5–12)
NEUTROPHILS # BLD AUTO: 4.74 10*3/MM3 (ref 1.7–7)
NEUTROPHILS NFR BLD AUTO: 60.2 % (ref 42.7–76)
NRBC BLD AUTO-RTO: 0 /100 WBC (ref 0–0.2)
PLATELET # BLD AUTO: 330 10*3/MM3 (ref 140–450)
PMV BLD AUTO: 10.1 FL (ref 6–12)
POTASSIUM BLD-SCNC: 3.4 MMOL/L (ref 3.5–5.2)
PROT SERPL-MCNC: 7.1 G/DL (ref 6–8.5)
RBC # BLD AUTO: 5.01 10*6/MM3 (ref 3.77–5.28)
SODIUM BLD-SCNC: 139 MMOL/L (ref 136–145)
TRIGL SERPL-MCNC: 187 MG/DL (ref 0–150)
TSH SERPL DL<=0.05 MIU/L-ACNC: 1.01 MIU/ML (ref 0.27–4.2)
VIT B12 BLD-MCNC: 654 PG/ML (ref 211–946)
VLDLC SERPL-MCNC: 37.4 MG/DL (ref 5–40)
WBC NRBC COR # BLD: 7.86 10*3/MM3 (ref 3.4–10.8)

## 2019-05-21 PROCEDURE — 85025 COMPLETE CBC W/AUTO DIFF WBC: CPT | Performed by: NURSE PRACTITIONER

## 2019-05-21 PROCEDURE — 82746 ASSAY OF FOLIC ACID SERUM: CPT | Performed by: NURSE PRACTITIONER

## 2019-05-21 PROCEDURE — 80053 COMPREHEN METABOLIC PANEL: CPT | Performed by: NURSE PRACTITIONER

## 2019-05-21 PROCEDURE — 82652 VIT D 1 25-DIHYDROXY: CPT | Performed by: NURSE PRACTITIONER

## 2019-05-21 PROCEDURE — 80061 LIPID PANEL: CPT | Performed by: NURSE PRACTITIONER

## 2019-05-21 PROCEDURE — 82607 VITAMIN B-12: CPT | Performed by: NURSE PRACTITIONER

## 2019-05-21 PROCEDURE — 84443 ASSAY THYROID STIM HORMONE: CPT | Performed by: NURSE PRACTITIONER

## 2019-05-21 PROCEDURE — 83735 ASSAY OF MAGNESIUM: CPT

## 2019-05-22 NOTE — PROGRESS NOTES
I have reviewed the notes, assessments, and/or procedures performed by MARTIN Morgan and I concur with her documentation of Jamaica Shell.

## 2019-05-23 PROBLEM — E87.6 HYPOKALEMIA: Status: ACTIVE | Noted: 2019-05-23

## 2019-05-23 LAB — MAGNESIUM SERPL-MCNC: 2 MG/DL (ref 1.6–2.6)

## 2019-05-24 LAB — 1,25(OH)2D3 SERPL-MCNC: 50.1 PG/ML (ref 19.9–79.3)

## 2020-02-04 ENCOUNTER — OFFICE VISIT (OUTPATIENT)
Dept: INTERNAL MEDICINE | Facility: CLINIC | Age: 55
End: 2020-02-04

## 2020-02-04 VITALS
OXYGEN SATURATION: 99 % | SYSTOLIC BLOOD PRESSURE: 160 MMHG | BODY MASS INDEX: 26.09 KG/M2 | HEIGHT: 63 IN | RESPIRATION RATE: 20 BRPM | DIASTOLIC BLOOD PRESSURE: 104 MMHG | TEMPERATURE: 97 F | WEIGHT: 147.25 LBS | HEART RATE: 102 BPM

## 2020-02-04 DIAGNOSIS — Z00.00 WELLNESS EXAMINATION: Primary | ICD-10-CM

## 2020-02-04 DIAGNOSIS — H61.23 BILATERAL IMPACTED CERUMEN: ICD-10-CM

## 2020-02-04 DIAGNOSIS — G47.00 INSOMNIA, UNSPECIFIED TYPE: ICD-10-CM

## 2020-02-04 DIAGNOSIS — I10 ESSENTIAL HYPERTENSION: ICD-10-CM

## 2020-02-04 DIAGNOSIS — E78.00 HYPERCHOLESTEREMIA: ICD-10-CM

## 2020-02-04 DIAGNOSIS — Z13.29 THYROID DISORDER SCREENING: ICD-10-CM

## 2020-02-04 DIAGNOSIS — F41.8 DEPRESSION WITH ANXIETY: ICD-10-CM

## 2020-02-04 DIAGNOSIS — E87.6 HYPOKALEMIA: ICD-10-CM

## 2020-02-04 DIAGNOSIS — Z13.21 ENCOUNTER FOR VITAMIN DEFICIENCY SCREENING: ICD-10-CM

## 2020-02-04 LAB
25(OH)D3 SERPL-MCNC: 11.2 NG/ML (ref 30–100)
ALBUMIN SERPL-MCNC: 4.7 G/DL (ref 3.5–5.2)
ALBUMIN/GLOB SERPL: 1.6 G/DL
ALP SERPL-CCNC: 95 U/L (ref 39–117)
ALT SERPL W P-5'-P-CCNC: 23 U/L (ref 1–33)
ANION GAP SERPL CALCULATED.3IONS-SCNC: 20.8 MMOL/L (ref 5–15)
AST SERPL-CCNC: 32 U/L (ref 1–32)
BASOPHILS # BLD AUTO: 0.02 10*3/MM3 (ref 0–0.2)
BASOPHILS NFR BLD AUTO: 0.3 % (ref 0–1.5)
BILIRUB SERPL-MCNC: 0.3 MG/DL (ref 0.2–1.2)
BUN BLD-MCNC: 11 MG/DL (ref 6–20)
BUN/CREAT SERPL: 14.5 (ref 7–25)
CALCIUM SPEC-SCNC: 10 MG/DL (ref 8.6–10.5)
CHLORIDE SERPL-SCNC: 100 MMOL/L (ref 98–107)
CHOLEST SERPL-MCNC: 318 MG/DL (ref 0–200)
CO2 SERPL-SCNC: 19.2 MMOL/L (ref 22–29)
CREAT BLD-MCNC: 0.76 MG/DL (ref 0.57–1)
DEPRECATED RDW RBC AUTO: 41.2 FL (ref 37–54)
EOSINOPHIL # BLD AUTO: 0.03 10*3/MM3 (ref 0–0.4)
EOSINOPHIL NFR BLD AUTO: 0.5 % (ref 0.3–6.2)
ERYTHROCYTE [DISTWIDTH] IN BLOOD BY AUTOMATED COUNT: 14.2 % (ref 12.3–15.4)
FOLATE SERPL-MCNC: 4.47 NG/ML (ref 4.78–24.2)
GFR SERPL CREATININE-BSD FRML MDRD: 97 ML/MIN/1.73
GLOBULIN UR ELPH-MCNC: 2.9 GM/DL
GLUCOSE BLD-MCNC: 74 MG/DL (ref 65–99)
HCT VFR BLD AUTO: 42.6 % (ref 34–46.6)
HDLC SERPL-MCNC: 109 MG/DL (ref 40–60)
HGB BLD-MCNC: 14.3 G/DL (ref 12–15.9)
IMM GRANULOCYTES # BLD AUTO: 0.01 10*3/MM3 (ref 0–0.05)
IMM GRANULOCYTES NFR BLD AUTO: 0.2 % (ref 0–0.5)
LDLC SERPL CALC-MCNC: 184 MG/DL (ref 0–100)
LDLC/HDLC SERPL: 1.69 {RATIO}
LYMPHOCYTES # BLD AUTO: 1.85 10*3/MM3 (ref 0.7–3.1)
LYMPHOCYTES NFR BLD AUTO: 30.1 % (ref 19.6–45.3)
MCH RBC QN AUTO: 27.3 PG (ref 26.6–33)
MCHC RBC AUTO-ENTMCNC: 33.6 G/DL (ref 31.5–35.7)
MCV RBC AUTO: 81.5 FL (ref 79–97)
MONOCYTES # BLD AUTO: 0.46 10*3/MM3 (ref 0.1–0.9)
MONOCYTES NFR BLD AUTO: 7.5 % (ref 5–12)
NEUTROPHILS # BLD AUTO: 3.78 10*3/MM3 (ref 1.7–7)
NEUTROPHILS NFR BLD AUTO: 61.4 % (ref 42.7–76)
NRBC BLD AUTO-RTO: 0 /100 WBC (ref 0–0.2)
PLATELET # BLD AUTO: 311 10*3/MM3 (ref 140–450)
PMV BLD AUTO: 10.3 FL (ref 6–12)
POTASSIUM BLD-SCNC: 4.2 MMOL/L (ref 3.5–5.2)
PROT SERPL-MCNC: 7.6 G/DL (ref 6–8.5)
RBC # BLD AUTO: 5.23 10*6/MM3 (ref 3.77–5.28)
SODIUM BLD-SCNC: 140 MMOL/L (ref 136–145)
TRIGL SERPL-MCNC: 126 MG/DL (ref 0–150)
TSH SERPL DL<=0.05 MIU/L-ACNC: 0.52 UIU/ML (ref 0.27–4.2)
VIT B12 BLD-MCNC: 711 PG/ML (ref 211–946)
VLDLC SERPL-MCNC: 25.2 MG/DL (ref 5–40)
WBC NRBC COR # BLD: 6.15 10*3/MM3 (ref 3.4–10.8)

## 2020-02-04 PROCEDURE — 99213 OFFICE O/P EST LOW 20 MIN: CPT | Performed by: NURSE PRACTITIONER

## 2020-02-04 PROCEDURE — 80061 LIPID PANEL: CPT | Performed by: NURSE PRACTITIONER

## 2020-02-04 PROCEDURE — 84443 ASSAY THYROID STIM HORMONE: CPT | Performed by: NURSE PRACTITIONER

## 2020-02-04 PROCEDURE — 85025 COMPLETE CBC W/AUTO DIFF WBC: CPT | Performed by: NURSE PRACTITIONER

## 2020-02-04 PROCEDURE — 82306 VITAMIN D 25 HYDROXY: CPT | Performed by: NURSE PRACTITIONER

## 2020-02-04 PROCEDURE — 80053 COMPREHEN METABOLIC PANEL: CPT | Performed by: NURSE PRACTITIONER

## 2020-02-04 PROCEDURE — 82607 VITAMIN B-12: CPT | Performed by: NURSE PRACTITIONER

## 2020-02-04 PROCEDURE — 99396 PREV VISIT EST AGE 40-64: CPT | Performed by: NURSE PRACTITIONER

## 2020-02-04 PROCEDURE — 69210 REMOVE IMPACTED EAR WAX UNI: CPT | Performed by: NURSE PRACTITIONER

## 2020-02-04 PROCEDURE — 82746 ASSAY OF FOLIC ACID SERUM: CPT | Performed by: NURSE PRACTITIONER

## 2020-02-04 RX ORDER — AMLODIPINE BESYLATE 5 MG/1
5 TABLET ORAL DAILY
Qty: 30 TABLET | Refills: 1 | Status: SHIPPED | OUTPATIENT
Start: 2020-02-04 | End: 2020-03-09 | Stop reason: SDUPTHER

## 2020-02-04 RX ORDER — VENLAFAXINE HYDROCHLORIDE 150 MG/1
150 CAPSULE, EXTENDED RELEASE ORAL DAILY
Qty: 30 CAPSULE | Refills: 2 | Status: SHIPPED | OUTPATIENT
Start: 2020-02-04 | End: 2020-03-09 | Stop reason: SDUPTHER

## 2020-02-04 RX ORDER — AMITRIPTYLINE HYDROCHLORIDE 25 MG/1
TABLET, FILM COATED ORAL
Qty: 45 TABLET | Refills: 2 | Status: SHIPPED | OUTPATIENT
Start: 2020-02-04 | End: 2020-05-21

## 2020-02-04 NOTE — PROGRESS NOTES
Subjective   Chief Complaint   Patient presents with   • Annual Exam       Jamaica Shell is a 54 y.o. female here today for annual exam. Blood pressure has been very elevated recently due to large amount of stress at home.  Her son  several years ago and she has not been coping well recently.  She has increased depression, anxiety, and insomnia.  She is not sleeping well and is waking up frequently throughout the night with nightmares.  Boyfriend states that she has been grinding her teeth in her sleep and she is waking up with jaw pain.  She has been on the lisinopril-HCTZ for several years and this is always controlled her blood pressures well.  She is been having some headaches.  No blurry vision or nausea.  She has been trying to eat a heart healthy diet.  She admits to being very sedentary.  She is wanting to quit smoking but due to her large amount of stress she feels that she would not be successful.  Discussed all options with her and she wants to discuss this at a later date when depression and anxiety are better controlled.  She has difficulty hearing during her appointment today she states that she has had problems with her hearing for over a year.  She was told previously that her ears were impacted with cerumen but she has not had these irrigated.  She denies any shortness of breath or chest pain.    Overall healthy: No  Regular exercise:  No, will try to increase  Diet is well balanced:  Yes  Vitamin Supplement:  No, will start multivitamin gummy.   Alcohol intake:  Yes, mild   Tobacco use:  Yes, wants to quit but under large amount of stress.     Cardiovascular risk is low:  Mild to moderate   Menstrual cycle regular:  N/A, stopped cycles at 42 years.   PAP:  Due to day.   Concern for STDs:  Yes, was told on blood work 3 years ago had std.   Mammogram:  Had one years ago when around 22 years old.   Last colon screening:  Never had one - willing to do cologuard.   Regular dental exam:  Making appt  soon  Regular eye exam:  Making appt soon  Immunizations up to date:  No, declines flu injection  Wear a seatbelt regularly:  Yes  Wear sunscreen regularly when outdoors:  No    Review of Systems   Constitutional: Positive for fatigue. Negative for activity change, appetite change, chills, diaphoresis, fever, unexpected weight gain and unexpected weight loss.   HENT: Positive for hearing loss. Negative for ear discharge, ear pain, mouth sores, nosebleeds, sinus pressure, sneezing and sore throat.    Eyes: Negative for pain, discharge and itching.   Respiratory: Negative for cough, chest tightness, shortness of breath and wheezing.    Cardiovascular: Negative for chest pain, palpitations and leg swelling.   Gastrointestinal: Negative for abdominal pain, constipation, diarrhea, nausea and vomiting.   Endocrine: Negative for heat intolerance, polydipsia and polyphagia.   Genitourinary: Negative for dysuria, flank pain, frequency, hematuria and urgency.   Musculoskeletal: Positive for arthralgias, back pain and myalgias. Negative for gait problem, joint swelling, neck pain and neck stiffness.   Skin: Negative for color change, pallor and rash.   Neurological: Positive for headache. Negative for seizures, speech difficulty, weakness and numbness.   Psychiatric/Behavioral: Positive for decreased concentration, sleep disturbance, depressed mood and stress. Negative for agitation and suicidal ideas. The patient is nervous/anxious.        Past Medical History:   Diagnosis Date   • Asthma    • Depression      History reviewed. No pertinent surgical history.  Family History   Problem Relation Age of Onset   • Diabetes Mother      Social History     Tobacco Use   Smoking Status Current Every Day Smoker   Smokeless Tobacco Never Used      Social History     Substance and Sexual Activity   Alcohol Use Yes    Comment: 1 daily      No Known Allergies    Current Outpatient Medications on File Prior to Visit   Medication Sig   •  "albuterol (PROVENTIL HFA;VENTOLIN HFA) 108 (90 Base) MCG/ACT inhaler 2 puffs q4-6 hr prn cough, shortness of breath or wheezing   • lisinopril-hydrochlorothiazide (PRINZIDE,ZESTORETIC) 10-12.5 MG per tablet Take 1 tablet by mouth Daily.     No current facility-administered medications on file prior to visit.        Objective   Vitals:    02/04/20 0935   BP: (!) 160/104   BP Location: Left arm   Patient Position: Sitting   Cuff Size: Adult   Pulse: 102   Resp: 20   Temp: 97 °F (36.1 °C)   TempSrc: Temporal   SpO2: 99%   Weight: 66.8 kg (147 lb 4 oz)   Height: 160 cm (63\")   PainSc: 0-No pain     Body mass index is 26.08 kg/m².    Physical Exam   Constitutional: She is oriented to person, place, and time. She appears well-developed and well-nourished.   HENT:   Head: Normocephalic and atraumatic.   Right Ear: Decreased hearing is noted. cerumen impaction is present.  Left Ear: Decreased hearing is noted. An impacted cerumen is present.  Nose: Nose normal.   Ear canals were completely occluded with cerumen. Hearing immediately improved after irrigation. Ear canals bilateral have mild redness after irrigation. TM normal bilaterally.    Eyes: Pupils are equal, round, and reactive to light. Conjunctivae, EOM and lids are normal.   Neck: Trachea normal. No thyromegaly present.   Cardiovascular: Normal rate, regular rhythm and normal heart sounds.   Pulmonary/Chest: Effort normal and breath sounds normal. No respiratory distress.   Abdominal: Soft. Bowel sounds are normal. There is no tenderness.   Musculoskeletal: Normal range of motion.   Normal range of motion of all major joints   Neurological: She is alert and oriented to person, place, and time. She has normal strength. GCS eye subscore is 4. GCS verbal subscore is 5. GCS motor subscore is 6.   Skin: Skin is warm and dry. Capillary refill takes 2 to 3 seconds. Turgor is normal.   Psychiatric: She has a normal mood and affect. Her speech is normal and behavior is " normal. Thought content normal. Cognition and memory are normal. She expresses no homicidal and no suicidal ideation. She expresses no suicidal plans and no homicidal plans.   Vitals reviewed.      Ear Cerumen Removal  Date/Time: 2/4/2020 10:59 AM  Performed by: Candace Cam APRN  Authorized by: Candace Cam APRN   Consent: Verbal consent obtained.  Risks and benefits: risks, benefits and alternatives were discussed  Consent given by: patient  Patient understanding: patient states understanding of the procedure being performed  Patient identity confirmed: verbally with patient    Anesthesia:  Local Anesthetic: none  Patient tolerance: Patient tolerated the procedure well with no immediate complications  Comments: Warm solution of 1/2 peroxide and 1/2 water was used. Large amount of cerumen was removed bilaterally. Hearing immediately improved. Patient tolerated this well.       Procedure type: instrumentation and irrigation         Assessment/Plan     Current Outpatient Medications:   •  albuterol (PROVENTIL HFA;VENTOLIN HFA) 108 (90 Base) MCG/ACT inhaler, 2 puffs q4-6 hr prn cough, shortness of breath or wheezing, Disp: 18 g, Rfl: 0  •  lisinopril-hydrochlorothiazide (PRINZIDE,ZESTORETIC) 10-12.5 MG per tablet, Take 1 tablet by mouth Daily., Disp: 30 tablet, Rfl: 5  •  venlafaxine XR (EFFEXOR-XR) 150 MG 24 hr capsule, Take 1 capsule by mouth Daily., Disp: 30 capsule, Rfl: 2  •  amitriptyline (ELAVIL) 25 MG tablet, Take 1-2 tablets by mouth one hour before bedtime as needed for sleep., Disp: 45 tablet, Rfl: 2  •  amLODIPine (NORVASC) 5 MG tablet, Take 1 tablet by mouth Daily., Disp: 30 tablet, Rfl: 1    Jamaica was seen today for annual exam.    Diagnoses and all orders for this visit:    Wellness examination  Patient will continue to eat a well-balanced diet, drink adequate amount of water, exercise at least 3 times weekly, and get adequate rest.  Suggested a multivitamin daily.  Discussed  future preventative screenings and immunizations.    Essential hypertension  Chronic issue unstable requiring medication management and monitoring. Will eat well balanced heart healthy diet, drink adequate water, increase physical activity, and get adequate rest. Monitor blood pressures daily and contact office for any readings consistently above 140/90. Patient will report any associated symptoms such as headaches, blurry vision, or nausea. Patient will go to ER for any chest pressure or chest pain.   -     amLODIPine (NORVASC) 5 MG tablet; Take 1 tablet by mouth Daily.  -     CBC Auto Differential  -     Comprehensive Metabolic Panel  -     Lipid Panel    Depression with anxiety  Chronic issue unstable requiring medication management and monitoring. Will eat well balanced diet, increase water intake, increase physical activity during the day, and get adequate rest. Discussed relaxation and coping skills and exercises. Suggested self referral to behavioral therapist. Given contact number to [facility] crisis line [phone] and [facility] care clinic [phone].  -     venlafaxine XR (EFFEXOR-XR) 150 MG 24 hr capsule; Take 1 capsule by mouth Daily.  -     CBC Auto Differential  -     Comprehensive Metabolic Panel  -     Lipid Panel  -     Vitamin B12 & Folate  -     Vitamin D 25 Hydroxy  -     TSH Rfx On Abnormal To Free T4    Insomnia, unspecified type  Chronic issue unstable requiring medication management and monitoring. Will eat well balanced diet, drink adequate water decreasing caffeine intake, and increase physical activity during the day. Discussed relaxation and coping skills and exercises. Discussed sleep hygiene and limiting electronic devices prior to bedtime.    -     amitriptyline (ELAVIL) 25 MG tablet; Take 1-2 tablets by mouth one hour before bedtime as needed for sleep.    Hypercholesteremia  Chronic issue unstable requiring medication management and monitoring. Will  eat well balanced heart healthy diet, drink adequate water, increase physical activity, and get adequate rest.   -     Lipid Panel    Bilateral impacted cerumen  Discussed using debrox ear drops to prevent cerumen build up.   -     Ear Cerumen Removal    Hypokalemia  -     Comprehensive Metabolic Panel    Thyroid disorder screening  -     TSH Rfx On Abnormal To Free T4    Encounter for vitamin deficiency screening  -     Vitamin B12 & Folate  -     Vitamin D 25 Hydroxy           I have spent 45 minutes with patient face to face with 8 minutes spent counseling on: smoking cessation and all available options for quitting including nicotine patches, gum, Wellbutrin, and Chantix. Discussed relaxation exercises and suggested behavioral therapy to increase rate of success. Provided resources including 1-800-QUIT-NOW.    Counseling was given to patient for the following topics: appropriate exercise, healthy eating habits, disease prevention, risk factors for cancer, importance of self breast exam and breast health, importance of immunizations, including risks and benefits, bone health, sun safety, seatbelt use, safe sex, importance of smoking cessation, and discussed various options for quitting and risks of smoking (including electronic cigarettes) including cancer and death.      Plan of care reviewed with the patient at the conclusion of today's visit.  Education was provided regarding diagnosis, management, and any prescribed or recommended OTC medications.  Patient verbalized understanding of and agreement with management plan.     Return if symptoms worsen or fail to improve.      Candace Taylor, MARTIN    Please note that portions of this note were completed with a voice recognition program. Efforts were made to edit the dictations, but occasionally words are mistranscribed.

## 2020-02-17 RX ORDER — ERGOCALCIFEROL 1.25 MG/1
50000 CAPSULE ORAL WEEKLY
Qty: 4 CAPSULE | Refills: 2 | Status: SHIPPED | OUTPATIENT
Start: 2020-02-17 | End: 2020-03-09 | Stop reason: SDUPTHER

## 2020-02-17 RX ORDER — ATORVASTATIN CALCIUM 10 MG/1
10 TABLET, FILM COATED ORAL NIGHTLY
Qty: 30 TABLET | Refills: 5 | Status: SHIPPED | OUTPATIENT
Start: 2020-02-17 | End: 2021-03-19

## 2020-02-17 NOTE — PROGRESS NOTES
Please contact patient and advise that her cholesterol is very elevated.  I have called in a cholesterol medication for her to take at bedtime.  Tell her to follow a heart healthy low-cholesterol diet and increase her physical activity.  Her vitamin D is low and I have called in a once a week supplement. All other labs are normal.

## 2020-02-25 ENCOUNTER — TELEPHONE (OUTPATIENT)
Dept: INTERNAL MEDICINE | Facility: CLINIC | Age: 55
End: 2020-02-25

## 2020-02-25 DIAGNOSIS — I10 ESSENTIAL HYPERTENSION: ICD-10-CM

## 2020-02-25 RX ORDER — LISINOPRIL AND HYDROCHLOROTHIAZIDE 12.5; 1 MG/1; MG/1
1 TABLET ORAL DAILY
Qty: 30 TABLET | Refills: 5 | Status: SHIPPED | OUTPATIENT
Start: 2020-02-25 | End: 2020-05-21

## 2020-02-25 NOTE — TELEPHONE ENCOUNTER
Patient called and stated that refills are needed for the following prescription(s):    lisinopril-hydrochlorothiazide (PRINZIDE,ZESTORETIC) 10-12.5 MG per tablet    Ph[facility]: [facility]  PH: [phone]  FX: [phone]    Patient callback: [phone]    Please advise.

## 2020-03-09 DIAGNOSIS — F41.8 DEPRESSION WITH ANXIETY: ICD-10-CM

## 2020-03-09 DIAGNOSIS — I10 ESSENTIAL HYPERTENSION: ICD-10-CM

## 2020-03-09 RX ORDER — VENLAFAXINE HYDROCHLORIDE 150 MG/1
150 CAPSULE, EXTENDED RELEASE ORAL DAILY
Qty: 30 CAPSULE | Refills: 2 | Status: SHIPPED | OUTPATIENT
Start: 2020-03-09 | End: 2020-12-04

## 2020-03-09 RX ORDER — ERGOCALCIFEROL 1.25 MG/1
50000 CAPSULE ORAL WEEKLY
Qty: 4 CAPSULE | Refills: 2 | Status: SHIPPED | OUTPATIENT
Start: 2020-03-09 | End: 2021-04-01 | Stop reason: SDUPTHER

## 2020-03-09 RX ORDER — AMLODIPINE BESYLATE 5 MG/1
5 TABLET ORAL DAILY
Qty: 30 TABLET | Refills: 1 | Status: SHIPPED | OUTPATIENT
Start: 2020-03-09 | End: 2020-05-27

## 2020-03-09 NOTE — TELEPHONE ENCOUNTER
Pt is requesting a refill venlafaxine, amlodipine and vitamin D and have them sent to [facility] on [address].

## 2020-04-24 DIAGNOSIS — J45.20 MILD INTERMITTENT ASTHMA WITHOUT COMPLICATION: ICD-10-CM

## 2020-04-24 RX ORDER — ALBUTEROL SULFATE 90 UG/1
AEROSOL, METERED RESPIRATORY (INHALATION)
Qty: 18 G | Refills: 0 | Status: SHIPPED | OUTPATIENT
Start: 2020-04-24 | End: 2022-10-27 | Stop reason: SDUPTHER

## 2020-05-21 ENCOUNTER — OFFICE VISIT (OUTPATIENT)
Dept: INTERNAL MEDICINE | Facility: CLINIC | Age: 55
End: 2020-05-21

## 2020-05-21 VITALS
HEIGHT: 64 IN | DIASTOLIC BLOOD PRESSURE: 90 MMHG | HEART RATE: 112 BPM | OXYGEN SATURATION: 98 % | RESPIRATION RATE: 16 BRPM | WEIGHT: 149 LBS | TEMPERATURE: 98.4 F | BODY MASS INDEX: 25.44 KG/M2 | SYSTOLIC BLOOD PRESSURE: 172 MMHG

## 2020-05-21 DIAGNOSIS — R20.0 NUMBNESS AND TINGLING OF LEFT UPPER AND LOWER EXTREMITY: Primary | ICD-10-CM

## 2020-05-21 DIAGNOSIS — F51.05 INSOMNIA DUE TO OTHER MENTAL DISORDER: ICD-10-CM

## 2020-05-21 DIAGNOSIS — F99 INSOMNIA DUE TO OTHER MENTAL DISORDER: ICD-10-CM

## 2020-05-21 DIAGNOSIS — I10 ESSENTIAL HYPERTENSION: ICD-10-CM

## 2020-05-21 DIAGNOSIS — R00.0 TACHYCARDIA: ICD-10-CM

## 2020-05-21 DIAGNOSIS — R94.31 ABNORMAL ECG: ICD-10-CM

## 2020-05-21 DIAGNOSIS — R00.2 PALPITATIONS: ICD-10-CM

## 2020-05-21 DIAGNOSIS — R20.2 NUMBNESS AND TINGLING OF LEFT UPPER AND LOWER EXTREMITY: Primary | ICD-10-CM

## 2020-05-21 PROCEDURE — 93000 ELECTROCARDIOGRAM COMPLETE: CPT | Performed by: NURSE PRACTITIONER

## 2020-05-21 PROCEDURE — 99214 OFFICE O/P EST MOD 30 MIN: CPT | Performed by: NURSE PRACTITIONER

## 2020-05-21 RX ORDER — QUETIAPINE FUMARATE 25 MG/1
TABLET, FILM COATED ORAL
Qty: 30 TABLET | Refills: 2 | Status: SHIPPED | OUTPATIENT
Start: 2020-05-21 | End: 2021-04-01

## 2020-05-21 RX ORDER — LISINOPRIL AND HYDROCHLOROTHIAZIDE 25; 20 MG/1; MG/1
1 TABLET ORAL DAILY
Qty: 30 TABLET | Refills: 5 | Status: SHIPPED | OUTPATIENT
Start: 2020-05-21 | End: 2020-06-04

## 2020-05-21 NOTE — PROGRESS NOTES
Subjective   Jamaica Shell is a 54 y.o. female here today for edema.    Chief Complaint   Patient presents with   • Leg Swelling   • Numbness     left side of body and face/ i will do ekg.   Jamaica is here today with a several week history of intermittent numbness of the left side of her body and swelling.  It is worse in the morning.  She also reports increased anxiety as well as palpitations.  Denies any chest pain or dyspnea.  She was recently started on amitriptyline for insomnia and amlodipine for hypertension.  She is not currently experiencing any chest pain.  No weakness or vision changes.  Denies headaches or decreased urine output.  No seizure activity or tremor.  No denies memory problem or confusion.    Review of Systems   Constitutional: Positive for fatigue. Negative for activity change, appetite change, chills, diaphoresis, fever, unexpected weight gain and unexpected weight loss.   HENT: Positive for hearing loss. Negative for ear discharge, ear pain, mouth sores, nosebleeds, sinus pressure, sneezing and sore throat.    Eyes: Negative for pain, discharge and itching.   Respiratory: Negative for cough, chest tightness, shortness of breath and wheezing.    Cardiovascular: Positive for palpitations and leg swelling. Negative for chest pain.   Gastrointestinal: Negative for abdominal pain, constipation, diarrhea, nausea and vomiting.   Endocrine: Negative for heat intolerance, polydipsia and polyphagia.   Genitourinary: Negative for dysuria, flank pain, frequency, hematuria and urgency.   Musculoskeletal: Positive for arthralgias, back pain and myalgias. Negative for gait problem, joint swelling, neck pain and neck stiffness.   Skin: Negative for color change, pallor and rash.   Neurological: Positive for numbness and headache. Negative for tremors, seizures, speech difficulty and weakness.   Psychiatric/Behavioral: Positive for decreased concentration, sleep disturbance, depressed mood and stress.  "Negative for agitation and suicidal ideas. The patient is nervous/anxious.        Past Medical History:   Diagnosis Date   • Asthma    • Depression      Family History   Problem Relation Age of Onset   • Diabetes Mother      History reviewed. No pertinent surgical history.  Social History     Socioeconomic History   • Marital status: Single     Spouse name: Not on file   • Number of children: Not on file   • Years of education: Not on file   • Highest education level: Not on file   Tobacco Use   • Smoking status: Current Every Day Smoker   • Smokeless tobacco: Never Used   Substance and Sexual Activity   • Alcohol use: Yes     Comment: 1 daily   • Drug use: No         Current Outpatient Medications:   •  albuterol sulfate  (90 Base) MCG/ACT inhaler, 2 puffs q4-6 hr prn cough, shortness of breath or wheezing, Disp: 18 g, Rfl: 0  •  amLODIPine (NORVASC) 5 MG tablet, Take 1 tablet by mouth Daily., Disp: 30 tablet, Rfl: 1  •  atorvastatin (LIPITOR) 10 MG tablet, Take 1 tablet by mouth Every Night., Disp: 30 tablet, Rfl: 5  •  venlafaxine XR (EFFEXOR-XR) 150 MG 24 hr capsule, Take 1 capsule by mouth Daily., Disp: 30 capsule, Rfl: 2  •  vitamin D (ERGOCALCIFEROL) 1.25 MG (27734 UT) capsule capsule, Take 1 capsule by mouth 1 (One) Time Per Week., Disp: 4 capsule, Rfl: 2  •  lisinopril-hydrochlorothiazide (PRINZIDE,ZESTORETIC) 20-25 MG per tablet, Take 1 tablet by mouth Daily., Disp: 30 tablet, Rfl: 5  •  QUEtiapine (SEROquel) 25 MG tablet, Take 1-2 tabs nightly for insomnia, Disp: 30 tablet, Rfl: 2    Objective   Vitals:    05/21/20 1527   BP: 172/90   Pulse: 112   Resp: 16   Temp: 98.4 °F (36.9 °C)   TempSrc: Temporal   SpO2: 98%   Weight: 67.6 kg (149 lb)   Height: 162.6 cm (64\")     ECG 12 Lead  Date/Time: 5/21/2020 4:23 PM  Performed by: Vira Mcmahan APRN  Authorized by: Vira Mcmahan APRN   Comparison: compared with previous ECG from 5/25/2019  Rhythm: sinus tachycardia  Rate: tachycardic  ST Depression: " II, V2, V4 and V5  Other findings: left ventricular hypertrophy  Other findings comments: possible LVH    Clinical impression: abnormal EKG            Body mass index is 25.58 kg/m².  Physical Exam   Constitutional: She is oriented to person, place, and time. She appears well-developed and well-nourished. No distress.   HENT:   Head: Normocephalic and atraumatic.   Eyes: Pupils are equal, round, and reactive to light. No scleral icterus. Right eye exhibits normal extraocular motion and no nystagmus. Left eye exhibits normal extraocular motion and no nystagmus.   Neck: Neck supple. No thyromegaly present.   Cardiovascular: Regular rhythm. Tachycardia present.   Pulmonary/Chest: Effort normal and breath sounds normal.   Abdominal: Soft. Bowel sounds are normal. She exhibits no distension.   Musculoskeletal: She exhibits no tenderness or deformity.   Trace left ankle edema   Neurological: She is alert and oriented to person, place, and time. She displays no atrophy and no tremor. No cranial nerve deficit or sensory deficit. She exhibits normal muscle tone. Coordination and gait normal. GCS eye subscore is 4. GCS verbal subscore is 5. GCS motor subscore is 6.   Skin: Skin is warm and dry. Capillary refill takes 2 to 3 seconds. She is not diaphoretic.   Psychiatric: She has a normal mood and affect. Her behavior is normal. Judgment and thought content normal.   Nursing note and vitals reviewed.      Assessment/Plan   Problem List Items Addressed This Visit        Cardiovascular and Mediastinum    Essential hypertension    Relevant Medications    amLODIPine (NORVASC) 5 MG tablet    lisinopril-hydrochlorothiazide (PRINZIDE,ZESTORETIC) 20-25 MG per tablet    Other Relevant Orders    Holter Monitor - 72 Hour Up To 21 Days    Ambulatory Referral to Cardiology      Other Visit Diagnoses     Numbness and tingling of left upper and lower extremity    -  Primary    Insomnia due to other mental disorder        Relevant Medications     QUEtiapine (SEROquel) 25 MG tablet    Abnormal ECG        Relevant Orders    Holter Monitor - 72 Hour Up To 21 Days    Ambulatory Referral to Cardiology    Adult Transthoracic Echo Complete W/ Cont if Necessary Per Protocol    ECG 12 Lead    Palpitations        Relevant Orders    Holter Monitor - 72 Hour Up To 21 Days    Ambulatory Referral to Cardiology    Adult Transthoracic Echo Complete W/ Cont if Necessary Per Protocol    ECG 12 Lead    Tachycardia        Relevant Orders    Holter Monitor - 72 Hour Up To 21 Days    Ambulatory Referral to Cardiology    Adult Transthoracic Echo Complete W/ Cont if Necessary Per Protocol    ECG 12 Lead        Jamaica was seen today for leg swelling and numbness.    Diagnoses and all orders for this visit:    Numbness and tingling of left upper and lower extremity  Numbness and tingling worse in the morning.  It is also on her face.  No headaches or vision problems.  Normal neuro exam.  No weakness.  Possibly side effect of newly started amitriptyline and/or anxiety.  Will discontinue amitriptyline for insomnia and try Seroquel as this may also help with anxiety.  Essential hypertension  -     lisinopril-hydrochlorothiazide (PRINZIDE,ZESTORETIC) 20-25 MG per tablet; Take 1 tablet by mouth Daily.  -     Holter Monitor - 72 Hour Up To 21 Days; Future  -     Ambulatory Referral to Cardiology  Increase lisinopril today, avoid adding beta-blocker at this time and will get Holter monitor before managing tachycardia  Insomnia due to other mental disorder  -     QUEtiapine (SEROquel) 25 MG tablet; Take 1-2 tabs nightly for insomnia    Abnormal ECG  -     Holter Monitor - 72 Hour Up To 21 Days; Future  -     Ambulatory Referral to Cardiology  -     Adult Transthoracic Echo Complete W/ Cont if Necessary Per Protocol; Future  -     ECG 12 Lead  Will refer to cardiology.  EKG shows some ST depression though patient is in no acute distress and not having symptoms of angina.  Would like her  to see cardiology ASAP.  Counseled patient on reporting to ED or calling 911 should she experience chest pain or dyspnea  Palpitations  -     Holter Monitor - 72 Hour Up To 21 Days; Future  -     Ambulatory Referral to Cardiology  -     Adult Transthoracic Echo Complete W/ Cont if Necessary Per Protocol; Future  -     ECG 12 Lead    Tachycardia  -     Holter Monitor - 72 Hour Up To 21 Days; Future  -     Ambulatory Referral to Cardiology  -     Adult Transthoracic Echo Complete W/ Cont if Necessary Per Protocol; Future  -     ECG 12 Lead    Consider beta-blocker at 2-week follow-up         The patient was counseled regarding diagnostic results, impressions, prognosis, instructions for management, risk factor reductions, education, and importance of treatment compliance.  The patient verbalized understanding of and agreement with the plan of care.    Advised patient to call with any further questions and any new or worsening symptoms.     Return in about 2 weeks (around 6/4/2020), or if symptoms worsen or fail to improve.      Vira Mcmahan, APRN    Please note that portions of this note were completed with a voice recognition program. Efforts were made to edit the dictations, but occasionally words are mistranscribed.

## 2020-05-27 ENCOUNTER — CONSULT (OUTPATIENT)
Dept: CARDIOLOGY | Facility: CLINIC | Age: 55
End: 2020-05-27

## 2020-05-27 VITALS
DIASTOLIC BLOOD PRESSURE: 86 MMHG | BODY MASS INDEX: 26.15 KG/M2 | HEART RATE: 88 BPM | SYSTOLIC BLOOD PRESSURE: 150 MMHG | OXYGEN SATURATION: 98 % | WEIGHT: 147.6 LBS | HEIGHT: 63 IN

## 2020-05-27 DIAGNOSIS — R00.2 PALPITATIONS: Primary | ICD-10-CM

## 2020-05-27 DIAGNOSIS — R06.09 DYSPNEA ON EXERTION: ICD-10-CM

## 2020-05-27 PROCEDURE — 99204 OFFICE O/P NEW MOD 45 MIN: CPT | Performed by: INTERNAL MEDICINE

## 2020-05-27 PROCEDURE — 93000 ELECTROCARDIOGRAM COMPLETE: CPT | Performed by: INTERNAL MEDICINE

## 2020-05-27 NOTE — PROGRESS NOTES
[facility]  Consultation H&P  Jamaica Shell  1965    There is no work phone number on file..    VISIT DATE:  05/27/2020    PCP: Candace Cam, MARTIN  [address] [address]    CC:  Chief Complaint   Patient presents with   • Abnormal ECG   • Palpitations   • Hypertension       ASSESSMENT:   Diagnosis Plan   1. Palpitations  Holter Monitor - 72 Hour Up To 21 Days   2. Dyspnea on exertion  Adult Stress Echo W/ Cont or Stress Agent if Necessary Per Protocol         PLAN:  Palpitations: 2-week ambulatory ECG monitor pending for symptom rhythm correlation.    Dyspnea on exertion: Stress echocardiogram pending for ischemia evaluation.  Complete transthoracic echo pending to assess underlying myocardial structure and function.    Hypertension: Goal is 130/80 mmHg.  Agree with discontinuation of amlodipine and recent titration of lisinopril hydrochlorothiazide.  Will trend blood pressure and titrate medical therapy as indicated follow results of stress echocardiogram and ambulatory CG monitor.  Discussed low-sodium diet.    Hyperlipidemia: Goal LDL less than 130, ideally less than 100.  Agree with recent initiation of statin therapy.  Repeat fasting lipid panel 3-6 months after initiation of statin therapy.    History of Present Illness   54-year-old -American female with history of hypertension and dyslipidemia presenting with worsening bilateral lower extreme edema, palpitations and dyspnea on exertion.  Describes intermittent episodes of a racing heartbeat in which it feels like her heart is beating fast and hard.  No obvious triggers.  Chest does feel uncomfortable during these episodes.  Often at rest.  She does describe some shortness of breath with activity and a class II pattern.  Denies exertional dyspnea.  Has a significant dyslipidemia with an LDL as high as 220 over the past 2 years, she was recently started on low-dose atorvastatin  "proximately 2 months ago.  She describes bilateral lower extremity edema which gradually worsens during the day.  She appears compliant with medical therapy.  Lisinopril hydrochlorothiazide was recently increased to 20-25 mg p.o. daily and amlodipine was recently discontinued.  She also has intermittent anxiety for which she is being treated.    PHYSICAL EXAMINATION:  Vitals:    05/27/20 0954   BP: 150/86   BP Location: Left arm   Patient Position: Sitting   Pulse: 88   SpO2: 98%   Weight: 67 kg (147 lb 9.6 oz)   Height: 160 cm (63\")     General Appearance:    Alert, cooperative, no distress, appears stated age   Head:    Normocephalic, without obvious abnormality, atraumatic   Eyes:    conjunctiva/corneas clear, EOM's intact, fundi     benign, both eyes   Ears:    Normal TM's and external ear canals, both ears   Nose:   Nares normal, septum midline, mucosa normal, no drainage    or sinus tenderness   Throat:   Lips, mucosa, and tongue normal; teeth and gums normal   Neck:   Supple, symmetrical, trachea midline, no adenopathy;     thyroid:  no enlargement/tenderness/nodules; no carotid    bruit or JVD   Back:     Symmetric, no curvature, ROM normal, no CVA tenderness   Lungs:     Clear to auscultation bilaterally, respirations unlabored   Chest Wall:    No tenderness or deformity    Heart:    Regular rate and rhythm, S1 and S2 normal, no murmur, rub   or gallop, normal carotid impulse bilaterally without bruit.   Abdomen:     Soft, non-tender, bowel sounds active all four quadrants,     no masses, no organomegaly   Extremities:   Extremities normal, atraumatic, no cyanosis.  2-3 + bilateral ankle edema   Pulses:   2+ and symmetric all extremities   Skin:   Skin color, texture, turgor normal, no rashes or lesions   Lymph nodes:   Cervical, supraclavicular, and axillary nodes normal   Neurologic:   normal strength, sensation intact     throughout       Diagnostic Data:    ECG 12 Lead  Date/Time: 5/27/2020 10:16 " AM  Performed by: Emmanuel Keith III, MD  Authorized by: Emmanuel Keith III, MD   Comparison: compared with previous ECG from 5/21/2020  Similar to previous ECG  Rhythm: sinus rhythm  Conduction: incomplete right bundle branch block  Other findings: left atrial abnormality    Clinical impression: abnormal EKG          Lab Results   Component Value Date    TRIG 126 02/04/2020     (H) 02/04/2020     Lab Results   Component Value Date    GLUCOSE 74 02/04/2020    BUN 11 02/04/2020    CREATININE 0.76 02/04/2020     02/04/2020    K 4.2 02/04/2020     02/04/2020    CO2 19.2 (L) 02/04/2020     No results found for: HGBA1C  Lab Results   Component Value Date    WBC 6.15 02/04/2020    HGB 14.3 02/04/2020    HCT 42.6 02/04/2020     02/04/2020       PROBLEM LIST:  Patient Active Problem List   Diagnosis   • Essential hypertension   • Depression with anxiety   • Mild intermittent asthma without complication   • Irritable bowel syndrome with diarrhea   • Pure hypercholesterolemia   • Hypokalemia       PAST MEDICAL HX  Past Medical History:   Diagnosis Date   • Asthma    • Depression        Allergies  No Known Allergies    Current Medications    Current Outpatient Medications:   •  albuterol sulfate  (90 Base) MCG/ACT inhaler, 2 puffs q4-6 hr prn cough, shortness of breath or wheezing, Disp: 18 g, Rfl: 0  •  atorvastatin (LIPITOR) 10 MG tablet, Take 1 tablet by mouth Every Night., Disp: 30 tablet, Rfl: 5  •  lisinopril-hydrochlorothiazide (PRINZIDE,ZESTORETIC) 20-25 MG per tablet, Take 1 tablet by mouth Daily., Disp: 30 tablet, Rfl: 5  •  venlafaxine XR (EFFEXOR-XR) 150 MG 24 hr capsule, Take 1 capsule by mouth Daily., Disp: 30 capsule, Rfl: 2  •  vitamin D (ERGOCALCIFEROL) 1.25 MG (32941 UT) capsule capsule, Take 1 capsule by mouth 1 (One) Time Per Week., Disp: 4 capsule, Rfl: 2  •  QUEtiapine (SEROquel) 25 MG tablet, Take 1-2 tabs nightly for insomnia, Disp: 30 tablet, Rfl: 2         ROS  Review of  Systems   Constitution: Positive for malaise/fatigue.   Cardiovascular: Positive for dyspnea on exertion, irregular heartbeat, leg swelling and palpitations.   Respiratory: Positive for shortness of breath.    Musculoskeletal: Positive for arthritis.   Psychiatric/Behavioral: The patient is nervous/anxious.        All other body systems reviewed and are negative    SOCIAL HX  Social History     Socioeconomic History   • Marital status: Single     Spouse name: Not on file   • Number of children: Not on file   • Years of education: Not on file   • Highest education level: Not on file   Tobacco Use   • Smoking status: Current Every Day Smoker     Packs/day: 0.50     Types: Cigarettes   • Smokeless tobacco: Never Used   Substance and Sexual Activity   • Alcohol use: Yes     Comment: occas   • Drug use: No       FAMILY HX  Family History   Problem Relation Age of Onset   • Diabetes Mother    • No Known Problems Father              Emmanuel Keith III, MD, FACC

## 2020-05-29 ENCOUNTER — HOSPITAL ENCOUNTER (OUTPATIENT)
Dept: CARDIOLOGY | Facility: HOSPITAL | Age: 55
Discharge: HOME | End: 2020-05-29
Admitting: NURSE PRACTITIONER

## 2020-05-29 VITALS — HEIGHT: 63 IN | BODY MASS INDEX: 26.05 KG/M2 | WEIGHT: 147 LBS

## 2020-05-29 DIAGNOSIS — R00.0 TACHYCARDIA: ICD-10-CM

## 2020-05-29 DIAGNOSIS — R94.31 ABNORMAL ECG: ICD-10-CM

## 2020-05-29 DIAGNOSIS — R00.2 PALPITATIONS: ICD-10-CM

## 2020-05-29 LAB
BH CV ECHO MEAS - AO ROOT AREA (BSA CORRECTED): 1.9
BH CV ECHO MEAS - AO ROOT AREA: 8 CM^2
BH CV ECHO MEAS - AO ROOT DIAM: 3.2 CM
BH CV ECHO MEAS - BSA(HAYCOCK): 1.7 M^2
BH CV ECHO MEAS - BSA: 1.7 M^2
BH CV ECHO MEAS - BZI_BMI: 26 KILOGRAMS/M^2
BH CV ECHO MEAS - BZI_METRIC_HEIGHT: 160 CM
BH CV ECHO MEAS - BZI_METRIC_WEIGHT: 66.7 KG
BH CV ECHO MEAS - EDV(CUBED): 132.3 ML
BH CV ECHO MEAS - EDV(MOD-SP2): 83 ML
BH CV ECHO MEAS - EDV(MOD-SP4): 92 ML
BH CV ECHO MEAS - EDV(TEICH): 123.5 ML
BH CV ECHO MEAS - EF(CUBED): 67.9 %
BH CV ECHO MEAS - EF(MOD-BP): 60 %
BH CV ECHO MEAS - EF(MOD-SP2): 60.2 %
BH CV ECHO MEAS - EF(MOD-SP4): 59.8 %
BH CV ECHO MEAS - EF(TEICH): 59.1 %
BH CV ECHO MEAS - ESV(CUBED): 42.5 ML
BH CV ECHO MEAS - ESV(MOD-SP2): 33 ML
BH CV ECHO MEAS - ESV(MOD-SP4): 37 ML
BH CV ECHO MEAS - ESV(TEICH): 50.5 ML
BH CV ECHO MEAS - FS: 31.5 %
BH CV ECHO MEAS - IVS/LVPW: 1
BH CV ECHO MEAS - IVSD: 1.2 CM
BH CV ECHO MEAS - LA DIMENSION: 3.8 CM
BH CV ECHO MEAS - LA/AO: 1.2
BH CV ECHO MEAS - LAD MAJOR: 4.6 CM
BH CV ECHO MEAS - LAT PEAK E' VEL: 11.5 CM/SEC
BH CV ECHO MEAS - LATERAL E/E' RATIO: 7
BH CV ECHO MEAS - LV DIASTOLIC VOL/BSA (35-75): 54.2 ML/M^2
BH CV ECHO MEAS - LV MASS(C)D: 250.1 GRAMS
BH CV ECHO MEAS - LV MASS(C)DI: 147.4 GRAMS/M^2
BH CV ECHO MEAS - LV SYSTOLIC VOL/BSA (12-30): 21.8 ML/M^2
BH CV ECHO MEAS - LVIDD: 5.1 CM
BH CV ECHO MEAS - LVIDS: 3.5 CM
BH CV ECHO MEAS - LVLD AP2: 7.8 CM
BH CV ECHO MEAS - LVLD AP4: 7.8 CM
BH CV ECHO MEAS - LVLS AP2: 6.6 CM
BH CV ECHO MEAS - LVLS AP4: 6.6 CM
BH CV ECHO MEAS - LVPWD: 1.2 CM
BH CV ECHO MEAS - MED PEAK E' VEL: 7 CM/SEC
BH CV ECHO MEAS - MEDIAL E/E' RATIO: 11.4
BH CV ECHO MEAS - MV A MAX VEL: 86.6 CM/SEC
BH CV ECHO MEAS - MV DEC TIME: 0.13 SEC
BH CV ECHO MEAS - MV E MAX VEL: 80.1 CM/SEC
BH CV ECHO MEAS - MV E/A: 0.92
BH CV ECHO MEAS - PA ACC SLOPE: 603 CM/SEC^2
BH CV ECHO MEAS - PA ACC TIME: 0.11 SEC
BH CV ECHO MEAS - PA PR(ACCEL): 31.3 MMHG
BH CV ECHO MEAS - RAP SYSTOLE: 3 MMHG
BH CV ECHO MEAS - RVDD: 2.7 CM
BH CV ECHO MEAS - RVSP: 38 MMHG
BH CV ECHO MEAS - SI(CUBED): 52.9 ML/M^2
BH CV ECHO MEAS - SI(MOD-SP2): 29.5 ML/M^2
BH CV ECHO MEAS - SI(MOD-SP4): 32.4 ML/M^2
BH CV ECHO MEAS - SI(TEICH): 43 ML/M^2
BH CV ECHO MEAS - SV(CUBED): 89.8 ML
BH CV ECHO MEAS - SV(MOD-SP2): 50 ML
BH CV ECHO MEAS - SV(MOD-SP4): 55 ML
BH CV ECHO MEAS - SV(TEICH): 73 ML
BH CV ECHO MEAS - TAPSE (>1.6): 1.7 CM2
BH CV ECHO MEAS - TR MAX PG: 35 MMHG
BH CV ECHO MEAS - TR MAX VEL: 297 CM/SEC
BH CV ECHO MEASUREMENTS AVERAGE E/E' RATIO: 8.66
BH CV VAS BP LEFT ARM: NORMAL MMHG
BH CV XLRA - RV BASE: 2.9 CM
BH CV XLRA - RV LENGTH: 6.6 CM
BH CV XLRA - RV MID: 1.8 CM
BH CV XLRA - TDI S': 13.2 CM/SEC
LEFT ATRIUM VOLUME INDEX: 28.3 ML/M^2
LEFT ATRIUM VOLUME: 48 ML

## 2020-05-29 PROCEDURE — 93306 TTE W/DOPPLER COMPLETE: CPT | Performed by: INTERNAL MEDICINE

## 2020-05-29 PROCEDURE — 93306 TTE W/DOPPLER COMPLETE: CPT

## 2020-06-04 ENCOUNTER — OFFICE VISIT (OUTPATIENT)
Dept: INTERNAL MEDICINE | Facility: CLINIC | Age: 55
End: 2020-06-04

## 2020-06-04 VITALS
TEMPERATURE: 97.3 F | HEIGHT: 62 IN | OXYGEN SATURATION: 98 % | BODY MASS INDEX: 27.42 KG/M2 | WEIGHT: 149 LBS | RESPIRATION RATE: 20 BRPM | HEART RATE: 84 BPM | DIASTOLIC BLOOD PRESSURE: 82 MMHG | SYSTOLIC BLOOD PRESSURE: 140 MMHG

## 2020-06-04 DIAGNOSIS — F51.01 PRIMARY INSOMNIA: ICD-10-CM

## 2020-06-04 DIAGNOSIS — I10 ESSENTIAL HYPERTENSION: Primary | ICD-10-CM

## 2020-06-04 PROCEDURE — 99214 OFFICE O/P EST MOD 30 MIN: CPT | Performed by: NURSE PRACTITIONER

## 2020-06-04 RX ORDER — HYDROCHLOROTHIAZIDE 25 MG/1
25 TABLET ORAL DAILY
Qty: 30 TABLET | Refills: 5 | OUTPATIENT
Start: 2020-06-04 | End: 2020-08-04

## 2020-06-04 RX ORDER — LISINOPRIL AND HYDROCHLOROTHIAZIDE 12.5; 1 MG/1; MG/1
TABLET ORAL
COMMUNITY
Start: 2020-05-30 | End: 2020-06-04

## 2020-06-04 RX ORDER — LISINOPRIL 40 MG/1
40 TABLET ORAL DAILY
Qty: 30 TABLET | Refills: 5 | Status: SHIPPED | OUTPATIENT
Start: 2020-06-04 | End: 2021-07-12

## 2020-06-04 NOTE — PROGRESS NOTES
Subjective   Chief Complaint   Patient presents with   • Hypertension     NOT SURE WHAT DOSE OF BP SHE NEEDS TO TAKE ?      Jamaica Shell is a 54 y.o. female here today for follow up on hypertension and insomnia. She has been unsure of what blood pressure medication to take. She was recently taken off amlodipine for let swelling. Ankle are still swollen but improving. She is taking Lisinopril 20-25mg and 10-12.5 daily. Blood pressures have been running high and averaging around 140/90. No headaches, changes in vision, shortness of air or chest pain.  She has not been sleeping well.  She has difficulty falling asleep and wakes up frequently throughout the night.  She has not been taking the amitriptyline and will restart this.    I have reviewed the following portions of the patient's history and confirmed they are accurate: allergies, current medications, past family history, past medical history, past social history, past surgical history and problem list    I have personally completed the patient's review of systems.    Review of Systems   Constitutional: Positive for fatigue. Negative for activity change, appetite change, chills, diaphoresis, fever, unexpected weight gain and unexpected weight loss.   HENT: Negative for ear discharge, ear pain, hearing loss, mouth sores, nosebleeds, sinus pressure, sneezing and sore throat.    Eyes: Negative for pain, discharge and itching.   Respiratory: Negative for cough, chest tightness, shortness of breath and wheezing.    Cardiovascular: Negative for chest pain, palpitations and leg swelling.   Gastrointestinal: Negative for abdominal pain, constipation, diarrhea, nausea and vomiting.   Endocrine: Negative for heat intolerance, polydipsia and polyphagia.   Genitourinary: Negative for dysuria, flank pain, frequency, hematuria and urgency.   Musculoskeletal: Positive for arthralgias, back pain and myalgias. Negative for gait problem, joint swelling, neck pain and neck  "stiffness.   Skin: Negative for color change, pallor and rash.   Neurological: Negative for seizures, speech difficulty, weakness, numbness and headache.   Psychiatric/Behavioral: Positive for sleep disturbance and stress. Negative for agitation, decreased concentration, suicidal ideas and depressed mood. The patient is not nervous/anxious.        Current Outpatient Medications on File Prior to Visit   Medication Sig   • albuterol sulfate  (90 Base) MCG/ACT inhaler 2 puffs q4-6 hr prn cough, shortness of breath or wheezing   • atorvastatin (LIPITOR) 10 MG tablet Take 1 tablet by mouth Every Night.   • QUEtiapine (SEROquel) 25 MG tablet Take 1-2 tabs nightly for insomnia   • venlafaxine XR (EFFEXOR-XR) 150 MG 24 hr capsule Take 1 capsule by mouth Daily.   • vitamin D (ERGOCALCIFEROL) 1.25 MG (34143 UT) capsule capsule Take 1 capsule by mouth 1 (One) Time Per Week.     No current facility-administered medications on file prior to visit.        Objective   Vitals:    06/04/20 1111   BP: 140/82   Pulse: 84   Resp: 20   Temp: 97.3 °F (36.3 °C)   TempSrc: Temporal   SpO2: 98%   Weight: 67.6 kg (149 lb)   Height: 157.5 cm (62\")     Body mass index is 27.25 kg/m².    Physical Exam   Constitutional: She is oriented to person, place, and time. She appears well-developed and well-nourished.   HENT:   Head: Normocephalic and atraumatic.   Nose: Nose normal.   Eyes: Pupils are equal, round, and reactive to light. Conjunctivae and lids are normal.   Neck: Trachea normal. No thyromegaly present.   Cardiovascular: Normal rate, regular rhythm and normal heart sounds.   Pulmonary/Chest: Effort normal and breath sounds normal. No respiratory distress.   Musculoskeletal:        Right ankle: She exhibits swelling.        Left ankle: She exhibits swelling.   Neurological: She is alert and oriented to person, place, and time. She has normal strength. GCS eye subscore is 4. GCS verbal subscore is 5. GCS motor subscore is 6.   Skin: " Skin is warm and dry.   Psychiatric: She has a normal mood and affect. Her speech is normal and behavior is normal. Thought content normal. Cognition and memory are normal.   Vitals reviewed.      Assessment/Plan   Jamaica was seen today for hypertension.    Diagnoses and all orders for this visit:    Essential hypertension  Chronic issue unstable requiring medication management and monitoring. Will eat well balanced heart healthy diet, drink adequate water, increase physical activity, and get adequate rest. Monitor blood pressures daily and contact office for any readings consistently above 140/90. Patient will report any associated symptoms such as headaches, blurry vision, or nausea. Patient will go to ER for any chest pressure or chest pain.   -     hydroCHLOROthiazide (HYDRODIURIL) 25 MG tablet; Take 1 tablet by mouth Daily.  -     lisinopril (PRINIVIL,ZESTRIL) 40 MG tablet; Take 1 tablet by mouth Daily.    Primary insomnia  Chronic issue unstable requiring medication management and monitoring. Will eat well balanced diet, drink adequate water decreasing caffeine intake, and increase physical activity during the day. Discussed relaxation and coping skills and exercises. Discussed sleep hygiene and limiting electronic devices prior to bedtime.    Continue amitriptyline at bedtime.         Current Outpatient Medications:   •  albuterol sulfate  (90 Base) MCG/ACT inhaler, 2 puffs q4-6 hr prn cough, shortness of breath or wheezing, Disp: 18 g, Rfl: 0  •  atorvastatin (LIPITOR) 10 MG tablet, Take 1 tablet by mouth Every Night., Disp: 30 tablet, Rfl: 5  •  QUEtiapine (SEROquel) 25 MG tablet, Take 1-2 tabs nightly for insomnia, Disp: 30 tablet, Rfl: 2  •  venlafaxine XR (EFFEXOR-XR) 150 MG 24 hr capsule, Take 1 capsule by mouth Daily., Disp: 30 capsule, Rfl: 2  •  vitamin D (ERGOCALCIFEROL) 1.25 MG (17987 UT) capsule capsule, Take 1 capsule by mouth 1 (One) Time Per Week., Disp: 4 capsule, Rfl: 2  •   hydroCHLOROthiazide (HYDRODIURIL) 25 MG tablet, Take 1 tablet by mouth Daily., Disp: 30 tablet, Rfl: 5  •  lisinopril (PRINIVIL,ZESTRIL) 40 MG tablet, Take 1 tablet by mouth Daily., Disp: 30 tablet, Rfl: 5       Plan of care reviewed with the patient at the conclusion of today's visit.  Education was provided regarding diagnosis, management, and any prescribed or recommended OTC medications.  Patient verbalized understanding of and agreement with management plan.     Return if symptoms worsen or fail to improve.      MARTIN Ramirez    Please note that portions of this note were completed with a voice recognition program. Efforts were made to edit the dictations, but occasionally words are mistranscribed.

## 2020-06-19 ENCOUNTER — HOSPITAL ENCOUNTER (OUTPATIENT)
Dept: CARDIOLOGY | Facility: HOSPITAL | Age: 55
Discharge: HOME | End: 2020-06-19
Admitting: INTERNAL MEDICINE

## 2020-06-19 VITALS
HEIGHT: 62 IN | HEART RATE: 107 BPM | WEIGHT: 149 LBS | DIASTOLIC BLOOD PRESSURE: 110 MMHG | BODY MASS INDEX: 27.42 KG/M2 | SYSTOLIC BLOOD PRESSURE: 178 MMHG

## 2020-06-19 DIAGNOSIS — R06.09 DYSPNEA ON EXERTION: ICD-10-CM

## 2020-06-19 PROCEDURE — 93320 DOPPLER ECHO COMPLETE: CPT

## 2020-06-19 PROCEDURE — 93352 ADMIN ECG CONTRAST AGENT: CPT | Performed by: INTERNAL MEDICINE

## 2020-06-19 PROCEDURE — 93018 CV STRESS TEST I&R ONLY: CPT | Performed by: INTERNAL MEDICINE

## 2020-06-19 PROCEDURE — 93350 STRESS TTE ONLY: CPT | Performed by: INTERNAL MEDICINE

## 2020-06-19 PROCEDURE — 93350 STRESS TTE ONLY: CPT

## 2020-06-19 PROCEDURE — 93325 DOPPLER ECHO COLOR FLOW MAPG: CPT

## 2020-06-19 PROCEDURE — 93320 DOPPLER ECHO COMPLETE: CPT | Performed by: INTERNAL MEDICINE

## 2020-06-19 PROCEDURE — 25010000002 SULFUR HEXAFLUORIDE MICROSPH 60.7-25 MG RECONSTITUTED SUSPENSION: Performed by: INTERNAL MEDICINE

## 2020-06-19 PROCEDURE — 93017 CV STRESS TEST TRACING ONLY: CPT

## 2020-06-19 PROCEDURE — 93325 DOPPLER ECHO COLOR FLOW MAPG: CPT | Performed by: INTERNAL MEDICINE

## 2020-06-19 RX ADMIN — SULFUR HEXAFLUORIDE 5 ML: KIT at 08:40

## 2020-06-22 LAB
BH CV ECHO MEAS - BSA(HAYCOCK): 1.7 M^2
BH CV ECHO MEAS - BSA: 1.7 M^2
BH CV ECHO MEAS - BZI_BMI: 27.4 KILOGRAMS/M^2
BH CV ECHO MEAS - BZI_METRIC_HEIGHT: 157 CM
BH CV ECHO MEAS - BZI_METRIC_WEIGHT: 67.6 KG
BH CV ECHO MEAS - EDV(CUBED): 125.1 ML
BH CV ECHO MEAS - EDV(MOD-SP2): 131 ML
BH CV ECHO MEAS - EDV(MOD-SP4): 136 ML
BH CV ECHO MEAS - EDV(TEICH): 118.3 ML
BH CV ECHO MEAS - EF(CUBED): 53.5 %
BH CV ECHO MEAS - EF(MOD-BP): 41 %
BH CV ECHO MEAS - EF(MOD-SP2): 38.4 %
BH CV ECHO MEAS - EF(MOD-SP4): 40.6 %
BH CV ECHO MEAS - EF(TEICH): 45.2 %
BH CV ECHO MEAS - EF{MOD-BP}: 41 %
BH CV ECHO MEAS - ESV(CUBED): 58.2 ML
BH CV ECHO MEAS - ESV(MOD-SP2): 80.7 ML
BH CV ECHO MEAS - ESV(MOD-SP4): 80.8 ML
BH CV ECHO MEAS - ESV(TEICH): 64.9 ML
BH CV ECHO MEAS - FS: 22.5 %
BH CV ECHO MEAS - IVS/LVPW: 0.91
BH CV ECHO MEAS - IVSD: 1.1 CM
BH CV ECHO MEAS - LA DIMENSION: 2.9 CM
BH CV ECHO MEAS - LAD MAJOR: 3.5 CM
BH CV ECHO MEAS - LAT PEAK E' VEL: 5.8 CM/SEC
BH CV ECHO MEAS - LATERAL E/E' RATIO: 8.2
BH CV ECHO MEAS - LV DIASTOLIC VOL/BSA (35-75): 80.8 ML/M^2
BH CV ECHO MEAS - LV MASS(C)D: 208 GRAMS
BH CV ECHO MEAS - LV MASS(C)DI: 123.6 GRAMS/M^2
BH CV ECHO MEAS - LV MAX PG: 2.9 MMHG
BH CV ECHO MEAS - LV MEAN PG: 1.2 MMHG
BH CV ECHO MEAS - LV SYSTOLIC VOL/BSA (12-30): 48 ML/M^2
BH CV ECHO MEAS - LV V1 MAX: 85.7 CM/SEC
BH CV ECHO MEAS - LV V1 MEAN: 48.8 CM/SEC
BH CV ECHO MEAS - LV V1 VTI: 20.2 CM
BH CV ECHO MEAS - LVIDD: 5 CM
BH CV ECHO MEAS - LVIDS: 3.9 CM
BH CV ECHO MEAS - LVLD AP2: 8.5 CM
BH CV ECHO MEAS - LVLD AP4: 9.1 CM
BH CV ECHO MEAS - LVLS AP2: 7.9 CM
BH CV ECHO MEAS - LVLS AP4: 7.7 CM
BH CV ECHO MEAS - LVPWD: 1.2 CM
BH CV ECHO MEAS - MED PEAK E' VEL: 4.2 CM/SEC
BH CV ECHO MEAS - MEDIAL E/E' RATIO: 11.1
BH CV ECHO MEAS - MV A MAX VEL: 97.3 CM/SEC
BH CV ECHO MEAS - MV DEC SLOPE: 336.3 CM/SEC^2
BH CV ECHO MEAS - MV DEC TIME: 0.15 SEC
BH CV ECHO MEAS - MV E MAX VEL: 47.1 CM/SEC
BH CV ECHO MEAS - MV E/A: 0.48
BH CV ECHO MEAS - PA ACC TIME: 0.11 SEC
BH CV ECHO MEAS - PA PR(ACCEL): 29.1 MMHG
BH CV ECHO MEAS - SI(CUBED): 39.8 ML/M^2
BH CV ECHO MEAS - SI(MOD-SP2): 29.9 ML/M^2
BH CV ECHO MEAS - SI(MOD-SP4): 32.8 ML/M^2
BH CV ECHO MEAS - SI(TEICH): 31.8 ML/M^2
BH CV ECHO MEAS - SV(CUBED): 67 ML
BH CV ECHO MEAS - SV(MOD-SP2): 50.3 ML
BH CV ECHO MEAS - SV(MOD-SP4): 55.2 ML
BH CV ECHO MEAS - SV(TEICH): 53.4 ML
BH CV ECHO MEAS - TAPSE (>1.6): 1.15 CM2
BH CV ECHO MEASUREMENTS AVERAGE E/E' RATIO: 9.42
BH CV STRESS BP STAGE 1: NORMAL
BH CV STRESS DURATION MIN STAGE 1: 3
BH CV STRESS DURATION MIN STAGE 2: 2
BH CV STRESS DURATION SEC STAGE 1: 0
BH CV STRESS DURATION SEC STAGE 2: 0
BH CV STRESS GRADE STAGE 1: 10
BH CV STRESS GRADE STAGE 2: 12
BH CV STRESS HR STAGE 1: 155
BH CV STRESS HR STAGE 2: 173
BH CV STRESS METS STAGE 1: 5
BH CV STRESS METS STAGE 2: 7.5
BH CV STRESS O2 STAGE 1: 97
BH CV STRESS O2 STAGE 2: 97
BH CV STRESS PROTOCOL 1: NORMAL
BH CV STRESS RECOVERY BP: NORMAL MMHG
BH CV STRESS RECOVERY HR: 100 BPM
BH CV STRESS SPEED STAGE 1: 1.7
BH CV STRESS SPEED STAGE 2: 2.5
BH CV STRESS STAGE 1: 1
BH CV STRESS STAGE 2: 2
BH CV VAS BP LEFT ARM: NORMAL MMHG
BH CV XLRA - RV BASE: 3 CM
BH CV XLRA - RV LENGTH: 4.2 CM
BH CV XLRA - RV MID: 2.4 CM
BH CV XLRA - TDI S': 10.4 CM/SEC
MAXIMAL PREDICTED HEART RATE: 168 BPM
PERCENT MAX PREDICTED HR: 102.98 %
STRESS BASELINE BP: NORMAL MMHG
STRESS BASELINE HR: 103 BPM
STRESS PERCENT HR: 121 %
STRESS POST ESTIMATED WORKLOAD: 7 METS
STRESS POST EXERCISE DUR MIN: 5 MIN
STRESS POST EXERCISE DUR SEC: 0 SEC
STRESS POST PEAK BP: NORMAL MMHG
STRESS POST PEAK HR: 173 BPM
STRESS TARGET HR: 143 BPM

## 2020-06-23 ENCOUNTER — TELEPHONE (OUTPATIENT)
Dept: CARDIOLOGY | Facility: CLINIC | Age: 55
End: 2020-06-23

## 2020-06-23 RX ORDER — BISOPROLOL FUMARATE 5 MG/1
5 TABLET, FILM COATED ORAL DAILY
Qty: 30 TABLET | Refills: 5 | Status: SHIPPED | OUTPATIENT
Start: 2020-06-23 | End: 2020-08-03

## 2020-08-03 ENCOUNTER — OFFICE VISIT (OUTPATIENT)
Dept: INTERNAL MEDICINE | Facility: CLINIC | Age: 55
End: 2020-08-03

## 2020-08-03 ENCOUNTER — LAB (OUTPATIENT)
Dept: LAB | Facility: HOSPITAL | Age: 55
End: 2020-08-03

## 2020-08-03 VITALS
HEIGHT: 62 IN | TEMPERATURE: 97.4 F | BODY MASS INDEX: 27.79 KG/M2 | HEART RATE: 90 BPM | OXYGEN SATURATION: 98 % | RESPIRATION RATE: 16 BRPM | SYSTOLIC BLOOD PRESSURE: 168 MMHG | DIASTOLIC BLOOD PRESSURE: 100 MMHG | WEIGHT: 151 LBS

## 2020-08-03 DIAGNOSIS — E78.00 PURE HYPERCHOLESTEROLEMIA: ICD-10-CM

## 2020-08-03 DIAGNOSIS — I10 ESSENTIAL HYPERTENSION: Primary | ICD-10-CM

## 2020-08-03 DIAGNOSIS — Z13.1 SCREENING FOR DIABETES MELLITUS: ICD-10-CM

## 2020-08-03 DIAGNOSIS — E55.9 VITAMIN D DEFICIENCY: ICD-10-CM

## 2020-08-03 LAB
25(OH)D3 SERPL-MCNC: 36.8 NG/ML (ref 30–100)
ALBUMIN SERPL-MCNC: 4.1 G/DL (ref 3.5–5.2)
ALBUMIN/GLOB SERPL: 1.5 G/DL
ALP SERPL-CCNC: 119 U/L (ref 39–117)
ALT SERPL W P-5'-P-CCNC: 54 U/L (ref 1–33)
ANION GAP SERPL CALCULATED.3IONS-SCNC: 16.2 MMOL/L (ref 5–15)
AST SERPL-CCNC: 145 U/L (ref 1–32)
BILIRUB SERPL-MCNC: 0.5 MG/DL (ref 0–1.2)
BUN SERPL-MCNC: 10 MG/DL (ref 6–20)
BUN/CREAT SERPL: 12.3 (ref 7–25)
CALCIUM SPEC-SCNC: 9.6 MG/DL (ref 8.6–10.5)
CHLORIDE SERPL-SCNC: 99 MMOL/L (ref 98–107)
CHOLEST SERPL-MCNC: 281 MG/DL (ref 0–200)
CO2 SERPL-SCNC: 29.8 MMOL/L (ref 22–29)
CREAT SERPL-MCNC: 0.81 MG/DL (ref 0.57–1)
DEPRECATED RDW RBC AUTO: 48.2 FL (ref 37–54)
ERYTHROCYTE [DISTWIDTH] IN BLOOD BY AUTOMATED COUNT: 15.4 % (ref 12.3–15.4)
GFR SERPL CREATININE-BSD FRML MDRD: 90 ML/MIN/1.73
GLOBULIN UR ELPH-MCNC: 2.7 GM/DL
GLUCOSE SERPL-MCNC: 82 MG/DL (ref 65–99)
HBA1C MFR BLD: 5.2 % (ref 4.8–5.6)
HCT VFR BLD AUTO: 35.5 % (ref 34–46.6)
HDLC SERPL-MCNC: 89 MG/DL (ref 40–60)
HGB BLD-MCNC: 11.7 G/DL (ref 12–15.9)
LDLC SERPL CALC-MCNC: 152 MG/DL (ref 0–100)
LDLC/HDLC SERPL: 1.71 {RATIO}
MCH RBC QN AUTO: 28.7 PG (ref 26.6–33)
MCHC RBC AUTO-ENTMCNC: 33 G/DL (ref 31.5–35.7)
MCV RBC AUTO: 87 FL (ref 79–97)
PLATELET # BLD AUTO: 275 10*3/MM3 (ref 140–450)
PMV BLD AUTO: 10.4 FL (ref 6–12)
POTASSIUM SERPL-SCNC: 2.5 MMOL/L (ref 3.5–5.2)
PROT SERPL-MCNC: 6.8 G/DL (ref 6–8.5)
RBC # BLD AUTO: 4.08 10*6/MM3 (ref 3.77–5.28)
SODIUM SERPL-SCNC: 145 MMOL/L (ref 136–145)
TRIGL SERPL-MCNC: 200 MG/DL (ref 0–150)
VLDLC SERPL-MCNC: 40 MG/DL (ref 5–40)
WBC # BLD AUTO: 6.08 10*3/MM3 (ref 3.4–10.8)

## 2020-08-03 PROCEDURE — 80061 LIPID PANEL: CPT | Performed by: NURSE PRACTITIONER

## 2020-08-03 PROCEDURE — 85027 COMPLETE CBC AUTOMATED: CPT | Performed by: NURSE PRACTITIONER

## 2020-08-03 PROCEDURE — 99214 OFFICE O/P EST MOD 30 MIN: CPT | Performed by: NURSE PRACTITIONER

## 2020-08-03 PROCEDURE — 83036 HEMOGLOBIN GLYCOSYLATED A1C: CPT | Performed by: NURSE PRACTITIONER

## 2020-08-03 PROCEDURE — 80053 COMPREHEN METABOLIC PANEL: CPT | Performed by: NURSE PRACTITIONER

## 2020-08-03 PROCEDURE — 82306 VITAMIN D 25 HYDROXY: CPT | Performed by: NURSE PRACTITIONER

## 2020-08-03 RX ORDER — BISOPROLOL FUMARATE 10 MG/1
10 TABLET, FILM COATED ORAL DAILY
Qty: 30 TABLET | Refills: 2 | Status: SHIPPED | OUTPATIENT
Start: 2020-08-03 | End: 2021-01-14

## 2020-08-03 NOTE — PROGRESS NOTES
Subjective   Chief Complaint   Patient presents with   • Hypertension     f/u      Jamaica Shell is a 54 y.o. female here today for follow-up on hypertension and hyperlipidemia.  Patient's blood pressure continues to be elevated.  She has Holter monitor on today and followed up with cardiology in June.  She was started on a beta-blocker then.  She is also on lisinopril and diuretic.  No changes in vision, no headache, shortness of breath, or chest pain.  Overall she feels well.  She is following a heart healthy low-cholesterol and fat diet. She has vitamin D deficiency but is not taking a supplement. She is not fasting for labs today.     I have reviewed the following portions of the patient's history and confirmed they are accurate: allergies, current medications, past family history, past medical history, past social history, past surgical history and problem list    I have personally completed the patient's review of systems.    Review of Systems   Constitutional: Positive for fatigue. Negative for activity change, appetite change, chills, diaphoresis, fever, unexpected weight gain and unexpected weight loss.   HENT: Negative for ear discharge, ear pain, hearing loss, mouth sores, nosebleeds, sinus pressure, sneezing and sore throat.    Eyes: Negative for pain, discharge and itching.   Respiratory: Negative for cough, chest tightness, shortness of breath and wheezing.    Cardiovascular: Negative for chest pain, palpitations and leg swelling.   Gastrointestinal: Negative for abdominal pain, constipation, diarrhea, nausea and vomiting.   Endocrine: Negative for heat intolerance, polydipsia and polyphagia.   Genitourinary: Negative for dysuria, flank pain, frequency, hematuria and urgency.   Musculoskeletal: Positive for arthralgias, back pain and myalgias. Negative for gait problem, joint swelling, neck pain and neck stiffness.   Skin: Negative for color change, pallor and rash.   Neurological: Negative for  "seizures, speech difficulty, weakness, numbness and headache.   Psychiatric/Behavioral: Positive for sleep disturbance and stress. Negative for agitation, decreased concentration, suicidal ideas and depressed mood. The patient is not nervous/anxious.        Current Outpatient Medications on File Prior to Visit   Medication Sig   • albuterol sulfate  (90 Base) MCG/ACT inhaler 2 puffs q4-6 hr prn cough, shortness of breath or wheezing   • atorvastatin (LIPITOR) 10 MG tablet Take 1 tablet by mouth Every Night.   • lisinopril (PRINIVIL,ZESTRIL) 40 MG tablet Take 1 tablet by mouth Daily.   • QUEtiapine (SEROquel) 25 MG tablet Take 1-2 tabs nightly for insomnia   • venlafaxine XR (EFFEXOR-XR) 150 MG 24 hr capsule Take 1 capsule by mouth Daily.   • vitamin D (ERGOCALCIFEROL) 1.25 MG (22097 UT) capsule capsule Take 1 capsule by mouth 1 (One) Time Per Week.     No current facility-administered medications on file prior to visit.        Objective   Vitals:    08/03/20 0903   BP: 168/100   Pulse: 90   Resp: 16   Temp: 97.4 °F (36.3 °C)   TempSrc: Temporal   SpO2: 98%   Weight: 68.5 kg (151 lb)   Height: 157.5 cm (62\")   PainSc: 0-No pain     Body mass index is 27.62 kg/m².    Physical Exam   Constitutional: She is oriented to person, place, and time. She appears well-developed and well-nourished.   HENT:   Head: Normocephalic and atraumatic.   Nose: Nose normal.   Eyes: Pupils are equal, round, and reactive to light. Conjunctivae and lids are normal.   Neck: Trachea normal. No thyromegaly present.   Cardiovascular: Normal rate, regular rhythm and normal heart sounds.   Pulmonary/Chest: Effort normal and breath sounds normal. No respiratory distress.   Neurological: She is alert and oriented to person, place, and time. She has normal strength. GCS eye subscore is 4. GCS verbal subscore is 5. GCS motor subscore is 6.   Skin: Skin is warm and dry.   Psychiatric: She has a normal mood and affect. Her speech is normal and " behavior is normal. Thought content normal. Cognition and memory are normal.   Vitals reviewed.      Assessment/Plan   Problem List Items Addressed This Visit        Cardiovascular and Mediastinum    Essential hypertension - Primary    Overview     Chronic issue unstable requiring medication management and monitoring. Will eat well balanced heart healthy diet, drink adequate water, increase physical activity, and get adequate rest. Monitor blood pressures daily and contact office for any readings consistently above 140/90. Patient will report any associated symptoms such as headaches, blurry vision, or nausea. Patient will go to ER for any chest pressure or chest pain.   Continue lisinopril and bisoprolol         Relevant Medications    bisoprolol (ZEBeta) 10 MG tablet    Other Relevant Orders    CBC (No Diff) (Completed)    Comprehensive Metabolic Panel (Completed)    Lipid Panel (Completed)    Pure hypercholesterolemia    Overview     Chronic unstable requiring medication management, lifestyle changes, and monitoring. Discussed how this being unstable increases risk of cardiovascular disease and adverse events. Will follow a hearth healthy diet low in cholesterol and fat. Discussed increasing fiber intake. Suggested fish oil or omega 3 supplement of 1200mg twice daily. Educated on how these help lower triglycerides and LDL. Will drink adequate water and increase physical activity as tolerated. Discussed importance of medication compliance.   Continue lipitor. Will get fasting labs completed at next appt.          Relevant Orders    Lipid Panel (Completed)       Digestive    Vitamin D deficiency    Overview     Chronic issue requiring diet changes, monitoring, and dietary supplement. Will increase dietary intake of Vitamin D through dairy milk, plant/nut based milk, and fortified foods such as juice and cereal. Will start dietary supplement as directed.            Relevant Orders    Vitamin D 25 Hydroxy (Completed)       Other Visit Diagnoses     Screening for diabetes mellitus        Relevant Orders    Hemoglobin A1c (Completed)             Current Outpatient Medications:   •  albuterol sulfate  (90 Base) MCG/ACT inhaler, 2 puffs q4-6 hr prn cough, shortness of breath or wheezing, Disp: 18 g, Rfl: 0  •  atorvastatin (LIPITOR) 10 MG tablet, Take 1 tablet by mouth Every Night., Disp: 30 tablet, Rfl: 5  •  lisinopril (PRINIVIL,ZESTRIL) 40 MG tablet, Take 1 tablet by mouth Daily., Disp: 30 tablet, Rfl: 5  •  QUEtiapine (SEROquel) 25 MG tablet, Take 1-2 tabs nightly for insomnia, Disp: 30 tablet, Rfl: 2  •  venlafaxine XR (EFFEXOR-XR) 150 MG 24 hr capsule, Take 1 capsule by mouth Daily., Disp: 30 capsule, Rfl: 2  •  vitamin D (ERGOCALCIFEROL) 1.25 MG (70878 UT) capsule capsule, Take 1 capsule by mouth 1 (One) Time Per Week., Disp: 4 capsule, Rfl: 2  •  bisoprolol (ZEBeta) 10 MG tablet, Take 1 tablet by mouth Daily., Disp: 30 tablet, Rfl: 2  •  potassium chloride (K-DUR,KLOR-CON) 20 MEQ CR tablet, Take 2 tablets by mouth 2 (Two) Times a Day. for 3 days followed by 20 meq(one tablet) two times a day for one week., Disp: 26 tablet, Rfl: 0       Plan of care reviewed with the patient at the conclusion of today's visit.  Education was provided regarding diagnosis, management, and any prescribed or recommended OTC medications.  Patient verbalized understanding of and agreement with management plan.     Return in about 2 weeks (around 8/17/2020), or if symptoms worsen or fail to improve.      MARTIN Ramirez    Please note that portions of this note were completed with a voice recognition program. Efforts were made to edit the dictations, but occasionally words are mistranscribed.

## 2020-08-04 ENCOUNTER — TELEPHONE (OUTPATIENT)
Dept: INTERNAL MEDICINE | Facility: CLINIC | Age: 55
End: 2020-08-04

## 2020-08-04 ENCOUNTER — HOSPITAL ENCOUNTER (EMERGENCY)
Facility: HOSPITAL | Age: 55
Discharge: HOME | End: 2020-08-04
Attending: EMERGENCY MEDICINE | Admitting: EMERGENCY MEDICINE

## 2020-08-04 VITALS
HEART RATE: 65 BPM | WEIGHT: 151 LBS | OXYGEN SATURATION: 97 % | HEIGHT: 63 IN | RESPIRATION RATE: 16 BRPM | SYSTOLIC BLOOD PRESSURE: 177 MMHG | BODY MASS INDEX: 26.75 KG/M2 | DIASTOLIC BLOOD PRESSURE: 89 MMHG | TEMPERATURE: 97.1 F

## 2020-08-04 DIAGNOSIS — R79.89 LOW TSH LEVEL: ICD-10-CM

## 2020-08-04 DIAGNOSIS — I10 HYPERTENSION, UNSPECIFIED TYPE: ICD-10-CM

## 2020-08-04 DIAGNOSIS — E87.6 HYPOKALEMIA: Primary | ICD-10-CM

## 2020-08-04 DIAGNOSIS — R74.8 ELEVATED LIVER ENZYMES: Primary | ICD-10-CM

## 2020-08-04 LAB
ALBUMIN SERPL-MCNC: 4.5 G/DL (ref 3.5–5.2)
ALBUMIN/GLOB SERPL: 1.9 G/DL
ALP SERPL-CCNC: 150 U/L (ref 39–117)
ALT SERPL W P-5'-P-CCNC: 50 U/L (ref 1–33)
ANION GAP SERPL CALCULATED.3IONS-SCNC: 17 MMOL/L (ref 5–15)
AST SERPL-CCNC: 85 U/L (ref 1–32)
BASOPHILS # BLD AUTO: 0.01 10*3/MM3 (ref 0–0.2)
BASOPHILS NFR BLD AUTO: 0.2 % (ref 0–1.5)
BILIRUB SERPL-MCNC: 0.5 MG/DL (ref 0–1.2)
BUN SERPL-MCNC: 7 MG/DL (ref 6–20)
BUN/CREAT SERPL: 10.6 (ref 7–25)
CALCIUM SPEC-SCNC: 9.1 MG/DL (ref 8.6–10.5)
CHLORIDE SERPL-SCNC: 94 MMOL/L (ref 98–107)
CO2 SERPL-SCNC: 31 MMOL/L (ref 22–29)
CREAT SERPL-MCNC: 0.66 MG/DL (ref 0.57–1)
DEPRECATED RDW RBC AUTO: 48.4 FL (ref 37–54)
EOSINOPHIL # BLD AUTO: 0 10*3/MM3 (ref 0–0.4)
EOSINOPHIL NFR BLD AUTO: 0 % (ref 0.3–6.2)
ERYTHROCYTE [DISTWIDTH] IN BLOOD BY AUTOMATED COUNT: 15.5 % (ref 12.3–15.4)
GFR SERPL CREATININE-BSD FRML MDRD: 114 ML/MIN/1.73
GLOBULIN UR ELPH-MCNC: 2.4 GM/DL
GLUCOSE SERPL-MCNC: 101 MG/DL (ref 65–99)
HCT VFR BLD AUTO: 38.5 % (ref 34–46.6)
HGB BLD-MCNC: 12.4 G/DL (ref 12–15.9)
IMM GRANULOCYTES # BLD AUTO: 0.01 10*3/MM3 (ref 0–0.05)
IMM GRANULOCYTES NFR BLD AUTO: 0.2 % (ref 0–0.5)
LYMPHOCYTES # BLD AUTO: 1.38 10*3/MM3 (ref 0.7–3.1)
LYMPHOCYTES NFR BLD AUTO: 25.4 % (ref 19.6–45.3)
MCH RBC QN AUTO: 27.9 PG (ref 26.6–33)
MCHC RBC AUTO-ENTMCNC: 32.2 G/DL (ref 31.5–35.7)
MCV RBC AUTO: 86.7 FL (ref 79–97)
MONOCYTES # BLD AUTO: 0.31 10*3/MM3 (ref 0.1–0.9)
MONOCYTES NFR BLD AUTO: 5.7 % (ref 5–12)
NEUTROPHILS NFR BLD AUTO: 3.73 10*3/MM3 (ref 1.7–7)
NEUTROPHILS NFR BLD AUTO: 68.5 % (ref 42.7–76)
NRBC BLD AUTO-RTO: 0.4 /100 WBC (ref 0–0.2)
PLATELET # BLD AUTO: 274 10*3/MM3 (ref 140–450)
PMV BLD AUTO: 9.7 FL (ref 6–12)
POTASSIUM SERPL-SCNC: 2.7 MMOL/L (ref 3.5–5.2)
PROT SERPL-MCNC: 6.9 G/DL (ref 6–8.5)
RBC # BLD AUTO: 4.44 10*6/MM3 (ref 3.77–5.28)
SODIUM SERPL-SCNC: 142 MMOL/L (ref 136–145)
TSH SERPL DL<=0.05 MIU/L-ACNC: 0.22 UIU/ML (ref 0.27–4.2)
WBC # BLD AUTO: 5.44 10*3/MM3 (ref 3.4–10.8)

## 2020-08-04 PROCEDURE — 80053 COMPREHEN METABOLIC PANEL: CPT | Performed by: NURSE PRACTITIONER

## 2020-08-04 PROCEDURE — 25010000003 POTASSIUM CHLORIDE 10 MEQ/100ML SOLUTION: Performed by: NURSE PRACTITIONER

## 2020-08-04 PROCEDURE — 93005 ELECTROCARDIOGRAM TRACING: CPT | Performed by: NURSE PRACTITIONER

## 2020-08-04 PROCEDURE — 96365 THER/PROPH/DIAG IV INF INIT: CPT

## 2020-08-04 PROCEDURE — 96375 TX/PRO/DX INJ NEW DRUG ADDON: CPT

## 2020-08-04 PROCEDURE — 85025 COMPLETE CBC W/AUTO DIFF WBC: CPT | Performed by: NURSE PRACTITIONER

## 2020-08-04 PROCEDURE — 84443 ASSAY THYROID STIM HORMONE: CPT | Performed by: NURSE PRACTITIONER

## 2020-08-04 PROCEDURE — 99284 EMERGENCY DEPT VISIT MOD MDM: CPT

## 2020-08-04 RX ORDER — ENALAPRILAT 2.5 MG/2ML
2.5 INJECTION INTRAVENOUS ONCE
Status: COMPLETED | OUTPATIENT
Start: 2020-08-04 | End: 2020-08-04

## 2020-08-04 RX ORDER — POTASSIUM CHLORIDE 7.45 MG/ML
10 INJECTION INTRAVENOUS ONCE
Status: DISCONTINUED | OUTPATIENT
Start: 2020-08-04 | End: 2020-08-04

## 2020-08-04 RX ORDER — POTASSIUM CHLORIDE 20 MEQ/1
40 TABLET, EXTENDED RELEASE ORAL 2 TIMES DAILY
Qty: 8 TABLET | Refills: 0 | Status: SHIPPED | OUTPATIENT
Start: 2020-08-04 | End: 2020-08-19

## 2020-08-04 RX ORDER — POTASSIUM CHLORIDE 750 MG/1
20 CAPSULE, EXTENDED RELEASE ORAL ONCE
Status: COMPLETED | OUTPATIENT
Start: 2020-08-04 | End: 2020-08-04

## 2020-08-04 RX ORDER — POTASSIUM CHLORIDE 7.45 MG/ML
10 INJECTION INTRAVENOUS ONCE
Status: COMPLETED | OUTPATIENT
Start: 2020-08-04 | End: 2020-08-04

## 2020-08-04 RX ADMIN — ENALAPRILAT 2.5 MG: 1.25 INJECTION INTRAVENOUS at 10:15

## 2020-08-04 RX ADMIN — POTASSIUM CHLORIDE 20 MEQ: 10 CAPSULE, COATED, EXTENDED RELEASE ORAL at 12:52

## 2020-08-04 RX ADMIN — POTASSIUM CHLORIDE 10 MEQ: 7.46 INJECTION, SOLUTION INTRAVENOUS at 10:18

## 2020-08-04 NOTE — ED PROVIDER NOTES
"Subjective   Patient is a pleasant 54-year-old female who presents to the ER today with complaints of \"abnormal potassium\".  Was seen at her primary care provider's office yesterday for routine checkup and had lab work drawn.  Was contacted this morning and advised to present to the ER for abnormal potassium.  Upon review of  lab work from yesterday it appears that patient had a potassium of 2.5.       History provided by:  Patient  Illness   Location:  Low potassium  Severity:  Severe  Onset quality:  Sudden  Duration: unknown.  Timing:  Constant  Progression:  Unchanged  Chronicity:  New  Context:  Routine labwork  Relieved by:  None tried  Worsened by:  Nothing  Ineffective treatments:  None tried  Associated symptoms: no abdominal pain, no chest pain, no fatigue, no fever, no nausea, no shortness of breath and no vomiting    Associated symptoms comment:  No palpiations      Review of Systems   Constitutional: Negative for fatigue and fever.   Respiratory: Negative for chest tightness and shortness of breath.    Cardiovascular: Negative for chest pain and palpitations.   Gastrointestinal: Negative for abdominal pain, nausea and vomiting.   Neurological: Negative for dizziness, syncope and light-headedness.   All other systems reviewed and are negative.      Past Medical History:   Diagnosis Date   • Asthma    • Depression    • Hypertension        No Known Allergies    Past Surgical History:   Procedure Laterality Date   • TUBAL ABDOMINAL LIGATION         Family History   Problem Relation Age of Onset   • Diabetes Mother    • No Known Problems Father        Social History     Socioeconomic History   • Marital status: Single     Spouse name: Not on file   • Number of children: Not on file   • Years of education: Not on file   • Highest education level: Not on file   Tobacco Use   • Smoking status: Current Every Day Smoker     Packs/day: 0.50     Types: Cigarettes   • Smokeless tobacco: Never Used   Substance and " Sexual Activity   • Alcohol use: Yes     Comment: occas   • Drug use: No   • Sexual activity: Defer           Objective   Physical Exam   Constitutional: She is oriented to person, place, and time. She appears well-developed and well-nourished.   HENT:   Right Ear: External ear normal.   Left Ear: External ear normal.   Eyes: Conjunctivae are normal.   Cardiovascular: Normal rate, regular rhythm and intact distal pulses.   Pulmonary/Chest: Effort normal and breath sounds normal.   Neurological: She is alert and oriented to person, place, and time.   Skin: Skin is warm and dry. Capillary refill takes less than 2 seconds.   Psychiatric: She has a normal mood and affect. Her behavior is normal.   Vitals reviewed.      Procedures           ED Course      Recent Results (from the past 24 hour(s))   Comprehensive Metabolic Panel    Collection Time: 08/04/20  8:42 AM   Result Value Ref Range    Glucose 101 (H) 65 - 99 mg/dL    BUN 7 6 - 20 mg/dL    Creatinine 0.66 0.57 - 1.00 mg/dL    Sodium 142 136 - 145 mmol/L    Potassium 2.7 (L) 3.5 - 5.2 mmol/L    Chloride 94 (L) 98 - 107 mmol/L    CO2 31.0 (H) 22.0 - 29.0 mmol/L    Calcium 9.1 8.6 - 10.5 mg/dL    Total Protein 6.9 6.0 - 8.5 g/dL    Albumin 4.50 3.50 - 5.20 g/dL    ALT (SGPT) 50 (H) 1 - 33 U/L    AST (SGOT) 85 (H) 1 - 32 U/L    Alkaline Phosphatase 150 (H) 39 - 117 U/L    Total Bilirubin 0.5 0.0 - 1.2 mg/dL    eGFR  African Amer 114 >60 mL/min/1.73    Globulin 2.4 gm/dL    A/G Ratio 1.9 g/dL    BUN/Creatinine Ratio 10.6 7.0 - 25.0    Anion Gap 17.0 (H) 5.0 - 15.0 mmol/L   TSH    Collection Time: 08/04/20  8:42 AM   Result Value Ref Range    TSH 0.218 (L) 0.270 - 4.200 uIU/mL   CBC Auto Differential    Collection Time: 08/04/20  8:42 AM   Result Value Ref Range    WBC 5.44 3.40 - 10.80 10*3/mm3    RBC 4.44 3.77 - 5.28 10*6/mm3    Hemoglobin 12.4 12.0 - 15.9 g/dL    Hematocrit 38.5 34.0 - 46.6 %    MCV 86.7 79.0 - 97.0 fL    MCH 27.9 26.6 - 33.0 pg    MCHC 32.2 31.5 -  "35.7 g/dL    RDW 15.5 (H) 12.3 - 15.4 %    RDW-SD 48.4 37.0 - 54.0 fl    MPV 9.7 6.0 - 12.0 fL    Platelets 274 140 - 450 10*3/mm3    Neutrophil % 68.5 42.7 - 76.0 %    Lymphocyte % 25.4 19.6 - 45.3 %    Monocyte % 5.7 5.0 - 12.0 %    Eosinophil % 0.0 (L) 0.3 - 6.2 %    Basophil % 0.2 0.0 - 1.5 %    Immature Grans % 0.2 0.0 - 0.5 %    Neutrophils, Absolute 3.73 1.70 - 7.00 10*3/mm3    Lymphocytes, Absolute 1.38 0.70 - 3.10 10*3/mm3    Monocytes, Absolute 0.31 0.10 - 0.90 10*3/mm3    Eosinophils, Absolute 0.00 0.00 - 0.40 10*3/mm3    Basophils, Absolute 0.01 0.00 - 0.20 10*3/mm3    Immature Grans, Absolute 0.01 0.00 - 0.05 10*3/mm3    nRBC 0.4 (H) 0.0 - 0.2 /100 WBC     Note: In addition to lab results from this visit, the labs listed above may include labs taken at another facility or during a different encounter within the last 24 hours. Please correlate lab times with ED admission and discharge times for further clarification of the services performed during this visit.    No orders to display     Vitals:    08/04/20 0824 08/04/20 0835 08/04/20 0850 08/04/20 0905   BP: (!) 202/109      BP Location: Left arm      Patient Position: Sitting      Pulse: 75 78 96 64   Resp: 16      Temp: 97.1 °F (36.2 °C)      TempSrc: Infrared      SpO2: 96% 97% 98% 97%   Weight: 68.5 kg (151 lb)      Height: 160 cm (63\")        Medications   potassium chloride 10 mEq in 100 mL IVPB (10 mEq Intravenous New Bag 8/4/20 1018)     And   potassium chloride 10 mEq in 100 mL IVPB (has no administration in time range)     And   potassium chloride 10 mEq in 100 mL IVPB (has no administration in time range)     And   potassium chloride 10 mEq in 100 mL IVPB (has no administration in time range)     And   potassium chloride 10 mEq in 100 mL IVPB (has no administration in time range)     And   potassium chloride 10 mEq in 100 mL IVPB (has no administration in time range)   enalaprilat (VASOTEC) injection 2.5 mg (2.5 mg Intravenous Given 8/4/20 " 1015)     ECG/EMG Results (last 24 hours)     Procedure Component Value Units Date/Time    ECG 12 Lead [[id number]] Collected:  08/04/20 0846     Updated:  08/04/20 1139        ECG 12 Lead           Spoke with Dr. Garcia with hospital medicine, and given the patient is asymptomatic with normal EKG, will provide potassium replacement for home and discharge the patient with follow-up tomorrow with her PCP for potassium recheck.    Discussed lab findings and plan with patient.  She is agreeable.  Advised to follow-up with PCP tomorrow for potassium recheck and thyroid eval.  return to the ER with development of symptoms.  Patient verbalized understanding of all discussed                                       MDM    Final diagnoses:   Hypokalemia   Hypertension, unspecified type   Low TSH level            Dena Che, MARTIN  08/04/20 1204

## 2020-08-04 NOTE — TELEPHONE ENCOUNTER
This provider was [place] yesterday 8/3/20 and contacted patient about critical lab results of potassium 2.5 at 7:30p. Instructed patient to go to the ER for further work up and IV potassium. She does not have ride to hospital. Instructed her to eat bananas, potatoes, and drink tomato juice. Contact 911 if having any chest pain, heart palpitations, or significant weakness. She reports she will go to hospital in the morning when  gets home from work.

## 2020-08-10 NOTE — PROGRESS NOTES
Contacted patient by phone the night her labs are resulted and advised that she needs to go to the hospital to have IV potassium.  She was unable to do this that night due to lack of transportation.  She stated she would go the next morning.  Suggested that she eat bananas and drink tomato juice to help with potassium level.  Patient's liver enzymes are elevated and there is concern for liver disease.  Discussed with patient that after she gets IV potassium she needs to follow-up with office in 1 week for additional labs including hepatitis work-up..

## 2020-08-19 ENCOUNTER — LAB (OUTPATIENT)
Dept: LAB | Facility: HOSPITAL | Age: 55
End: 2020-08-19

## 2020-08-19 ENCOUNTER — OFFICE VISIT (OUTPATIENT)
Dept: CARDIOLOGY | Facility: CLINIC | Age: 55
End: 2020-08-19

## 2020-08-19 VITALS
BODY MASS INDEX: 26.4 KG/M2 | SYSTOLIC BLOOD PRESSURE: 150 MMHG | HEIGHT: 63 IN | OXYGEN SATURATION: 96 % | WEIGHT: 149 LBS | HEART RATE: 90 BPM | DIASTOLIC BLOOD PRESSURE: 82 MMHG

## 2020-08-19 DIAGNOSIS — I10 ESSENTIAL HYPERTENSION: Primary | ICD-10-CM

## 2020-08-19 DIAGNOSIS — I10 ESSENTIAL HYPERTENSION: ICD-10-CM

## 2020-08-19 DIAGNOSIS — I47.1 PAT (PAROXYSMAL ATRIAL TACHYCARDIA) (HCC): ICD-10-CM

## 2020-08-19 DIAGNOSIS — E78.00 PURE HYPERCHOLESTEROLEMIA: ICD-10-CM

## 2020-08-19 PROBLEM — I47.19 PAT (PAROXYSMAL ATRIAL TACHYCARDIA): Status: ACTIVE | Noted: 2020-08-19

## 2020-08-19 LAB
ANION GAP SERPL CALCULATED.3IONS-SCNC: 19.7 MMOL/L (ref 5–15)
BUN SERPL-MCNC: 16 MG/DL (ref 6–20)
BUN/CREAT SERPL: 17.8 (ref 7–25)
CALCIUM SPEC-SCNC: 9.4 MG/DL (ref 8.6–10.5)
CHLORIDE SERPL-SCNC: 91 MMOL/L (ref 98–107)
CO2 SERPL-SCNC: 28.3 MMOL/L (ref 22–29)
CREAT SERPL-MCNC: 0.9 MG/DL (ref 0.57–1)
GFR SERPL CREATININE-BSD FRML MDRD: 80 ML/MIN/1.73
GLUCOSE SERPL-MCNC: 77 MG/DL (ref 65–99)
POTASSIUM SERPL-SCNC: 2.6 MMOL/L (ref 3.5–5.2)
SODIUM SERPL-SCNC: 139 MMOL/L (ref 136–145)

## 2020-08-19 PROCEDURE — 84244 ASSAY OF RENIN: CPT

## 2020-08-19 PROCEDURE — 36415 COLL VENOUS BLD VENIPUNCTURE: CPT

## 2020-08-19 PROCEDURE — 82088 ASSAY OF ALDOSTERONE: CPT

## 2020-08-19 PROCEDURE — 99213 OFFICE O/P EST LOW 20 MIN: CPT | Performed by: INTERNAL MEDICINE

## 2020-08-19 PROCEDURE — 80048 BASIC METABOLIC PNL TOTAL CA: CPT

## 2020-08-19 NOTE — PROGRESS NOTES
[facility] at [facility]  Office visit  Jamaica Shell  1965    There is no work phone number on file.    VISIT DATE:  8/19/2020    PCP: Candace Cam, APRN  [id number] [address]    CC:  Chief Complaint   Patient presents with   • Shortness of Breath       Previous cardiac studies and procedures:  May 2020   Echo  · Left ventricular systolic function is normal.  · Estimated EF appears to be in the range of 56 - 60%.  · Left ventricular wall thickness is consistent with mild concentric hypertrophy.    30-day event monitor  Episodes of PSVT, likely nonsustained atrial tachycardia, 8-25 beats in duration.    June 2020 stress echocardiogram  · Pt denied chest pain, pressure or SOA.  · Expected exercise time: 7:45 actual time: 5:00  · Hypertensive response to exercise with a blood pressure of 210/110 mmHg.  · Left ventricular systolic function is mildly decreased.  · Calculated EF = 41%. Estimated EF appears to be in the range of 46 - 50%.  · Left ventricular wall thickness is consistent with mild concentric hypertrophy.  · Left ventricular diastolic dysfunction (grade I) consistent with impaired relaxation.  · Small fixed distal anterior lateral wall defect either secondary to small region of infarction versus underlying hypertensive cardiomyopathy. No definitive ischemia visualized.    ASSESSMENT:   Diagnosis Plan   1. Essential hypertension     2. Pure hypercholesterolemia         PLAN:  PSVT: Nonsustained atrial tachycardia.  Offered reassurance.  Limited symptoms.  Continue bisoprolol 10 mg p.o. daily.     Heart failure with borderline ejection fraction, chronic: Suspect secondary to hypertensive cardiomyopathy.  Continue to titrate medical therapy.     Hypertension: Goal is 130/80 mmHg.    Avoiding calcium channel blockade due to lower extremity edema.  Off thiazide diuretic with recent episode of hypokalemia.  Continue lisinopril and bisoprolol.   "Aldosterone level and plasma renin activity level pending.  Will proceed with more definitive diagnostic testing if it is suspicion for hyperaldosteronism.  Will likely initiate therapy with spironolactone once laboratory evaluation has been completed.     Hyperlipidemia: Goal LDL less than 130, ideally less than 100.  Agree with recent initiation of statin therapy.  Repeat fasting lipid panel 3-6 months after initiation of statin therapy.    Subjective  Initial evaluation: Blood pressure still running around 140/80 mmHg.  Has developed bilateral lower extremity edema.  Stable shortness of breath and a mild class II pattern.  Denies chest pain.  Intermittent brief episodes of tachypalpitations.    Initial evaluation:54-year-old -American female with history of hypertension and dyslipidemia presenting with worsening bilateral lower extreme edema, palpitations and dyspnea on exertion.  Describes intermittent episodes of a racing heartbeat in which it feels like her heart is beating fast and hard.  No obvious triggers.  Chest does feel uncomfortable during these episodes.  Often at rest.  She does describe some shortness of breath with activity and a class II pattern.  Denies exertional dyspnea.  Has a significant dyslipidemia with an LDL as high as 220 over the past 2 years, she was recently started on low-dose atorvastatin proximately 2 months ago.  She describes bilateral lower extremity edema which gradually worsens during the day.  She appears compliant with medical therapy.  Lisinopril hydrochlorothiazide was recently increased to 20-25 mg p.o. daily and amlodipine was recently discontinued.  She also has intermittent anxiety for which she is being treated.    PHYSICAL EXAMINATION:  Vitals:    08/19/20 1034   Weight: 67.6 kg (149 lb)   Height: 160 cm (63\")     General Appearance:    Alert, cooperative, no distress, appears stated age   Head:    Normocephalic, without obvious abnormality, atraumatic   Eyes: "    conjunctiva/corneas clear   Nose:   Nares normal, septum midline, mucosa normal, no drainage   Throat:   Lips, teeth and gums normal   Neck:   Supple, symmetrical, trachea midline, no carotid    bruit or JVD   Lungs:     Clear to auscultation bilaterally, respirations unlabored   Chest Wall:    No tenderness or deformity    Heart:    Regular rate and rhythm, S1 and S2 normal, no murmur, rub   or gallop, normal carotid impulse bilaterally without bruit.   Abdomen:     Soft, non-tender   Extremities:   Extremities normal, atraumatic, no cyanosis or edema   Pulses:   2+ and symmetric all extremities   Skin:   Skin color, texture, turgor normal, no rashes or lesions       Diagnostic Data:  Procedures  Lab Results   Component Value Date    TRIG 200 (H) 08/03/2020    HDL 89 (H) 08/03/2020     Lab Results   Component Value Date    GLUCOSE 101 (H) 08/04/2020    BUN 7 08/04/2020    CREATININE 0.66 08/04/2020     08/04/2020    K 2.7 (L) 08/04/2020    CL 94 (L) 08/04/2020    CO2 31.0 (H) 08/04/2020     Lab Results   Component Value Date    HGBA1C 5.20 08/03/2020     Lab Results   Component Value Date    WBC 5.44 08/04/2020    HGB 12.4 08/04/2020    HCT 38.5 08/04/2020     08/04/2020       Allergies  No Known Allergies    Current Medications    Current Outpatient Medications:   •  albuterol sulfate  (90 Base) MCG/ACT inhaler, 2 puffs q4-6 hr prn cough, shortness of breath or wheezing, Disp: 18 g, Rfl: 0  •  atorvastatin (LIPITOR) 10 MG tablet, Take 1 tablet by mouth Every Night., Disp: 30 tablet, Rfl: 5  •  bisoprolol (ZEBeta) 10 MG tablet, Take 1 tablet by mouth Daily., Disp: 30 tablet, Rfl: 2  •  lisinopril (PRINIVIL,ZESTRIL) 40 MG tablet, Take 1 tablet by mouth Daily., Disp: 30 tablet, Rfl: 5  •  QUEtiapine (SEROquel) 25 MG tablet, Take 1-2 tabs nightly for insomnia, Disp: 30 tablet, Rfl: 2  •  venlafaxine XR (EFFEXOR-XR) 150 MG 24 hr capsule, Take 1 capsule by mouth Daily., Disp: 30 capsule, Rfl: 2  •   vitamin D (ERGOCALCIFEROL) 1.25 MG (60032 UT) capsule capsule, Take 1 capsule by mouth 1 (One) Time Per Week., Disp: 4 capsule, Rfl: 2          ROS  Review of Systems   Cardiovascular: Positive for dyspnea on exertion, leg swelling and palpitations. Negative for chest pain and near-syncope.   Respiratory: Negative for cough and snoring.        SOCIAL HX  Social History     Socioeconomic History   • Marital status: Single     Spouse name: Not on file   • Number of children: Not on file   • Years of education: Not on file   • Highest education level: Not on file   Tobacco Use   • Smoking status: Current Every Day Smoker     Packs/day: 0.50     Types: Cigarettes   • Smokeless tobacco: Never Used   Substance and Sexual Activity   • Alcohol use: Yes     Comment: occas   • Drug use: No   • Sexual activity: Defer       FAMILY HX  Family History   Problem Relation Age of Onset   • Diabetes Mother    • No Known Problems Father              Emmanuel Keith III, MD, FACC

## 2020-08-20 ENCOUNTER — TELEPHONE (OUTPATIENT)
Dept: CARDIOLOGY | Facility: CLINIC | Age: 55
End: 2020-08-20

## 2020-08-20 DIAGNOSIS — I10 ESSENTIAL HYPERTENSION: Primary | ICD-10-CM

## 2020-08-20 RX ORDER — POTASSIUM CHLORIDE 20 MEQ/1
40 TABLET, EXTENDED RELEASE ORAL 2 TIMES DAILY
Qty: 26 TABLET | Refills: 0 | Status: SHIPPED | OUTPATIENT
Start: 2020-08-20 | End: 2020-12-11

## 2020-08-20 RX ORDER — POTASSIUM CHLORIDE 20 MEQ/1
40 TABLET, EXTENDED RELEASE ORAL 2 TIMES DAILY
Qty: 26 TABLET | Refills: 0 | Status: SHIPPED | OUTPATIENT
Start: 2020-08-20 | End: 2020-08-20 | Stop reason: SDUPTHER

## 2020-08-20 NOTE — TELEPHONE ENCOUNTER
Attempted to call patient 3 more times. No answer. Left voice messages to call our office. Waiting for return call.

## 2020-08-20 NOTE — TELEPHONE ENCOUNTER
----- Message from Emmanuel Keith III, MD sent at 8/20/2020 11:23 AM EDT -----  Potassium level still low.  She needs to start on potassium chloride 40 mEq p.o. twice daily for 3 days, followed by 20 mEq p.o. twice daily for 1 week with repeat BMP in 1 week.

## 2020-08-22 PROBLEM — E55.9 VITAMIN D DEFICIENCY: Status: ACTIVE | Noted: 2020-08-22

## 2020-09-11 ENCOUNTER — OFFICE VISIT (OUTPATIENT)
Dept: INTERNAL MEDICINE | Facility: CLINIC | Age: 55
End: 2020-09-11

## 2020-09-11 ENCOUNTER — LAB (OUTPATIENT)
Dept: LAB | Facility: HOSPITAL | Age: 55
End: 2020-09-11

## 2020-09-11 VITALS
HEIGHT: 63 IN | WEIGHT: 141 LBS | BODY MASS INDEX: 24.98 KG/M2 | DIASTOLIC BLOOD PRESSURE: 74 MMHG | HEART RATE: 84 BPM | TEMPERATURE: 96.9 F | SYSTOLIC BLOOD PRESSURE: 124 MMHG

## 2020-09-11 DIAGNOSIS — E78.00 PURE HYPERCHOLESTEROLEMIA: ICD-10-CM

## 2020-09-11 DIAGNOSIS — E83.42 HYPOMAGNESEMIA: ICD-10-CM

## 2020-09-11 DIAGNOSIS — R79.89 LOW TSH LEVEL: ICD-10-CM

## 2020-09-11 DIAGNOSIS — R74.8 ELEVATED LIVER ENZYMES: ICD-10-CM

## 2020-09-11 DIAGNOSIS — I10 ESSENTIAL HYPERTENSION: ICD-10-CM

## 2020-09-11 DIAGNOSIS — F41.8 DEPRESSION WITH ANXIETY: ICD-10-CM

## 2020-09-11 DIAGNOSIS — I10 ESSENTIAL HYPERTENSION: Primary | ICD-10-CM

## 2020-09-11 LAB — HOLD SPECIMEN: NORMAL

## 2020-09-11 PROCEDURE — 84443 ASSAY THYROID STIM HORMONE: CPT | Performed by: NURSE PRACTITIONER

## 2020-09-11 PROCEDURE — 80048 BASIC METABOLIC PNL TOTAL CA: CPT | Performed by: NURSE PRACTITIONER

## 2020-09-11 PROCEDURE — 80074 ACUTE HEPATITIS PANEL: CPT | Performed by: NURSE PRACTITIONER

## 2020-09-11 PROCEDURE — 36415 COLL VENOUS BLD VENIPUNCTURE: CPT

## 2020-09-11 PROCEDURE — 83735 ASSAY OF MAGNESIUM: CPT | Performed by: NURSE PRACTITIONER

## 2020-09-11 PROCEDURE — 84481 FREE ASSAY (FT-3): CPT | Performed by: NURSE PRACTITIONER

## 2020-09-11 PROCEDURE — 99214 OFFICE O/P EST MOD 30 MIN: CPT | Performed by: NURSE PRACTITIONER

## 2020-09-12 LAB
ANION GAP SERPL CALCULATED.3IONS-SCNC: 13.9 MMOL/L (ref 5–15)
BUN SERPL-MCNC: 19 MG/DL (ref 6–20)
BUN/CREAT SERPL: 17.6 (ref 7–25)
CALCIUM SPEC-SCNC: 10.6 MG/DL (ref 8.6–10.5)
CHLORIDE SERPL-SCNC: 102 MMOL/L (ref 98–107)
CO2 SERPL-SCNC: 26.1 MMOL/L (ref 22–29)
CREAT SERPL-MCNC: 1.08 MG/DL (ref 0.57–1)
GFR SERPL CREATININE-BSD FRML MDRD: 65 ML/MIN/1.73
GLUCOSE SERPL-MCNC: 83 MG/DL (ref 65–99)
HAV IGM SERPL QL IA: NORMAL
HBV CORE IGM SERPL QL IA: NORMAL
HBV SURFACE AG SERPL QL IA: NORMAL
HCV AB SER DONR QL: NORMAL
MAGNESIUM SERPL-MCNC: 1.6 MG/DL (ref 1.6–2.6)
POTASSIUM SERPL-SCNC: 4.2 MMOL/L (ref 3.5–5.2)
SODIUM SERPL-SCNC: 142 MMOL/L (ref 136–145)
T3FREE SERPL-MCNC: 3.14 PG/ML (ref 2–4.4)
TSH SERPL DL<=0.05 MIU/L-ACNC: 0.69 UIU/ML (ref 0.27–4.2)

## 2020-09-14 ENCOUNTER — TELEPHONE (OUTPATIENT)
Dept: CARDIOLOGY | Facility: CLINIC | Age: 55
End: 2020-09-14

## 2020-09-14 DIAGNOSIS — I10 ESSENTIAL HYPERTENSION: Primary | ICD-10-CM

## 2020-09-14 RX ORDER — SPIRONOLACTONE 25 MG/1
25 TABLET ORAL DAILY
Qty: 30 TABLET | Refills: 3 | Status: SHIPPED | OUTPATIENT
Start: 2020-09-14 | End: 2020-10-13

## 2020-09-14 NOTE — TELEPHONE ENCOUNTER
----- Message from Emmanuel Keith III, MD sent at 9/14/2020  9:26 AM EDT -----  Potassium levels back to normal.  Start Aldactone 25 mg p.o. daily with repeat BMP in 2 weeks.

## 2020-10-12 ENCOUNTER — LAB (OUTPATIENT)
Dept: LAB | Facility: HOSPITAL | Age: 55
End: 2020-10-12

## 2020-10-12 ENCOUNTER — OFFICE VISIT (OUTPATIENT)
Dept: CARDIOLOGY | Facility: CLINIC | Age: 55
End: 2020-10-12

## 2020-10-12 VITALS
WEIGHT: 142.8 LBS | DIASTOLIC BLOOD PRESSURE: 80 MMHG | SYSTOLIC BLOOD PRESSURE: 140 MMHG | HEART RATE: 105 BPM | HEIGHT: 63 IN | OXYGEN SATURATION: 100 % | BODY MASS INDEX: 25.3 KG/M2

## 2020-10-12 DIAGNOSIS — E87.6 HYPOKALEMIA: ICD-10-CM

## 2020-10-12 DIAGNOSIS — I47.1 PAT (PAROXYSMAL ATRIAL TACHYCARDIA) (HCC): ICD-10-CM

## 2020-10-12 DIAGNOSIS — I10 ESSENTIAL HYPERTENSION: Primary | ICD-10-CM

## 2020-10-12 DIAGNOSIS — E78.00 PURE HYPERCHOLESTEROLEMIA: ICD-10-CM

## 2020-10-12 DIAGNOSIS — E83.42 HYPOMAGNESEMIA: ICD-10-CM

## 2020-10-12 LAB
ANION GAP SERPL CALCULATED.3IONS-SCNC: 18.9 MMOL/L (ref 5–15)
BUN SERPL-MCNC: 6 MG/DL (ref 6–20)
BUN/CREAT SERPL: 6.7 (ref 7–25)
CALCIUM SPEC-SCNC: 9.5 MG/DL (ref 8.6–10.5)
CHLORIDE SERPL-SCNC: 96 MMOL/L (ref 98–107)
CO2 SERPL-SCNC: 26.1 MMOL/L (ref 22–29)
CREAT SERPL-MCNC: 0.89 MG/DL (ref 0.57–1)
GFR SERPL CREATININE-BSD FRML MDRD: 81 ML/MIN/1.73
GLUCOSE SERPL-MCNC: 94 MG/DL (ref 65–99)
MAGNESIUM SERPL-MCNC: 1.7 MG/DL (ref 1.6–2.6)
POTASSIUM SERPL-SCNC: 3.1 MMOL/L (ref 3.5–5.2)
SODIUM SERPL-SCNC: 141 MMOL/L (ref 136–145)

## 2020-10-12 PROCEDURE — 36415 COLL VENOUS BLD VENIPUNCTURE: CPT

## 2020-10-12 PROCEDURE — 99214 OFFICE O/P EST MOD 30 MIN: CPT | Performed by: INTERNAL MEDICINE

## 2020-10-12 PROCEDURE — 83735 ASSAY OF MAGNESIUM: CPT | Performed by: INTERNAL MEDICINE

## 2020-10-12 PROCEDURE — 80048 BASIC METABOLIC PNL TOTAL CA: CPT

## 2020-10-12 NOTE — PROGRESS NOTES
[facility] at [facility]  Office visit  Jamaica Shell  1965  [phone]  There is no work phone number on file.    VISIT DATE:  10/12/2020    PCP: Candace Cam, MARTIN  [address] [address]    CC:  Chief Complaint   Patient presents with   • Hypertension       Previous cardiac studies and procedures:  May 2020   Echo  · Left ventricular systolic function is normal.  · Estimated EF appears to be in the range of 56 - 60%.  · Left ventricular wall thickness is consistent with mild concentric hypertrophy.    30-day event monitor  Episodes of PSVT, likely nonsustained atrial tachycardia, 8-25 beats in duration.    June 2020 stress echocardiogram  · Pt denied chest pain, pressure or SOA.  · Expected exercise time: 7:45 actual time: 5:00  · Hypertensive response to exercise with a blood pressure of 210/110 mmHg.  · Left ventricular systolic function is mildly decreased.  · Calculated EF = 41%. Estimated EF appears to be in the range of 46 - 50%.  · Left ventricular wall thickness is consistent with mild concentric hypertrophy.  · Left ventricular diastolic dysfunction (grade I) consistent with impaired relaxation.  · Small fixed distal anterior lateral wall defect either secondary to small region of infarction versus underlying hypertensive cardiomyopathy. No definitive ischemia visualized.    ASSESSMENT:   Diagnosis Plan   1. Essential hypertension     2. Pure hypercholesterolemia     3. PAT (paroxysmal atrial tachycardia) (CMS/HCC)     4. Hypokalemia  Basic Metabolic Panel   5. Hypomagnesemia  Magnesium       PLAN:  PSVT: Nonsustained atrial tachycardia.  Offered reassurance.  Limited symptoms.  Continue bisoprolol 10 mg p.o. daily.     Heart failure with borderline ejection fraction, chronic: Suspect secondary to hypertensive cardiomyopathy.  Continue to titrate medical therapy.     Hypertension: Goal is 130/80 mmHg.    Avoiding calcium channel blockade  due to lower extremity edema.  Off thiazide diuretic with recent episode of hypokalemia.  Continue lisinopril and bisoprolol and spironolactone.  Repeat BMP and magnesium level pending today.  Overall improving blood pressure control.    Hyperlipidemia: Goal LDL less than 130, ideally less than 100.  Agree with recent initiation of statin therapy.  Repeat fasting lipid panel 3-6 months after initiation of statin therapy.    Subjective  Initial evaluation: Blood pressures are predominantly running less than 140/80 mmHg.  The lower extremity edema has resolved.  Stable shortness of breath in a mild class II pattern.  Denies chest pain and palpitations.  She is compliant with medical therapy.  Recent laboratory evaluation remarkable for mild hypomagnesemia.    Initial evaluation:54-year-old -American female with history of hypertension and dyslipidemia presenting with worsening bilateral lower extreme edema, palpitations and dyspnea on exertion.  Describes intermittent episodes of a racing heartbeat in which it feels like her heart is beating fast and hard.  No obvious triggers.  Chest does feel uncomfortable during these episodes.  Often at rest.  She does describe some shortness of breath with activity and a class II pattern.  Denies exertional dyspnea.  Has a significant dyslipidemia with an LDL as high as 220 over the past 2 years, she was recently started on low-dose atorvastatin proximately 2 months ago.  She describes bilateral lower extremity edema which gradually worsens during the day.  She appears compliant with medical therapy.  Lisinopril hydrochlorothiazide was recently increased to 20-25 mg p.o. daily and amlodipine was recently discontinued.  She also has intermittent anxiety for which she is being treated.    PHYSICAL EXAMINATION:  Vitals:    10/12/20 1423   BP: 140/80   BP Location: Left arm   Patient Position: Sitting   Pulse: 105   SpO2: 100%   Weight: 64.8 kg (142 lb 12.8 oz)   Height: 160  "cm (63\")     General Appearance:    Alert, cooperative, no distress, appears stated age   Head:    Normocephalic, without obvious abnormality, atraumatic   Eyes:    conjunctiva/corneas clear   Nose:   Nares normal, septum midline, mucosa normal, no drainage   Throat:   Lips, teeth and gums normal   Neck:   Supple, symmetrical, trachea midline, no carotid    bruit or JVD   Lungs:     Clear to auscultation bilaterally, respirations unlabored   Chest Wall:    No tenderness or deformity    Heart:    Regular rate and rhythm, S1 and S2 normal, no murmur, rub   or gallop, normal carotid impulse bilaterally without bruit.   Abdomen:     Soft, non-tender   Extremities:   Extremities normal, atraumatic, no cyanosis or edema   Pulses:   2+ and symmetric all extremities   Skin:   Skin color, texture, turgor normal, no rashes or lesions       Diagnostic Data:  Procedures  Lab Results   Component Value Date    TRIG 200 (H) 08/03/2020    HDL 89 (H) 08/03/2020     Lab Results   Component Value Date    GLUCOSE 83 09/11/2020    BUN 19 09/11/2020    CREATININE 1.08 (H) 09/11/2020     09/11/2020    K 4.2 09/11/2020     09/11/2020    CO2 26.1 09/11/2020     Lab Results   Component Value Date    HGBA1C 5.20 08/03/2020     Lab Results   Component Value Date    WBC 5.44 08/04/2020    HGB 12.4 08/04/2020    HCT 38.5 08/04/2020     08/04/2020       Allergies  No Known Allergies    Current Medications    Current Outpatient Medications:   •  albuterol sulfate  (90 Base) MCG/ACT inhaler, 2 puffs q4-6 hr prn cough, shortness of breath or wheezing, Disp: 18 g, Rfl: 0  •  atorvastatin (LIPITOR) 10 MG tablet, Take 1 tablet by mouth Every Night., Disp: 30 tablet, Rfl: 5  •  bisoprolol (ZEBeta) 10 MG tablet, Take 1 tablet by mouth Daily., Disp: 30 tablet, Rfl: 2  •  lisinopril (PRINIVIL,ZESTRIL) 40 MG tablet, Take 1 tablet by mouth Daily., Disp: 30 tablet, Rfl: 5  •  potassium chloride (K-DUR,KLOR-CON) 20 MEQ CR tablet, Take " 2 tablets by mouth 2 (Two) Times a Day. for 3 days followed by 20 meq(one tablet) two times a day for one week., Disp: 26 tablet, Rfl: 0  •  QUEtiapine (SEROquel) 25 MG tablet, Take 1-2 tabs nightly for insomnia, Disp: 30 tablet, Rfl: 2  •  spironolactone (ALDACTONE) 25 MG tablet, Take 1 tablet by mouth Daily., Disp: 30 tablet, Rfl: 3  •  venlafaxine XR (EFFEXOR-XR) 150 MG 24 hr capsule, Take 1 capsule by mouth Daily., Disp: 30 capsule, Rfl: 2  •  vitamin D (ERGOCALCIFEROL) 1.25 MG (70655 UT) capsule capsule, Take 1 capsule by mouth 1 (One) Time Per Week., Disp: 4 capsule, Rfl: 2          ROS  Review of Systems   Cardiovascular: Positive for dyspnea on exertion, leg swelling and palpitations. Negative for chest pain and near-syncope.   Respiratory: Negative for cough and snoring.        SOCIAL HX  Social History     Socioeconomic History   • Marital status: Single     Spouse name: Not on file   • Number of children: Not on file   • Years of education: Not on file   • Highest education level: Not on file   Tobacco Use   • Smoking status: Current Every Day Smoker     Packs/day: 0.50     Types: Cigarettes   • Smokeless tobacco: Never Used   Substance and Sexual Activity   • Alcohol use: Yes     Comment: occas   • Drug use: No   • Sexual activity: Defer       FAMILY HX  Family History   Problem Relation Age of Onset   • Diabetes Mother    • No Known Problems Father              Emmanuel Keith III, MD, [facility]

## 2020-10-13 ENCOUNTER — TELEPHONE (OUTPATIENT)
Dept: CARDIOLOGY | Facility: CLINIC | Age: 55
End: 2020-10-13

## 2020-10-13 DIAGNOSIS — E87.6 HYPOKALEMIA: Primary | ICD-10-CM

## 2020-10-13 RX ORDER — SPIRONOLACTONE 50 MG/1
50 TABLET, FILM COATED ORAL DAILY
Qty: 90 TABLET | Refills: 1 | Status: SHIPPED | OUTPATIENT
Start: 2020-10-13 | End: 2021-01-29

## 2020-10-13 RX ORDER — MAGNESIUM OXIDE 400 MG/1
400 TABLET ORAL DAILY
Qty: 90 TABLET | Refills: 0 | Status: SHIPPED | OUTPATIENT
Start: 2020-10-13 | End: 2021-01-14

## 2020-10-13 RX ORDER — POTASSIUM CHLORIDE 750 MG/1
10 TABLET, FILM COATED, EXTENDED RELEASE ORAL DAILY
Qty: 30 TABLET | Refills: 11 | Status: SHIPPED | OUTPATIENT
Start: 2020-10-13 | End: 2020-12-16

## 2020-10-13 NOTE — TELEPHONE ENCOUNTER
Called patient. Spoke to Maikel. Notified of you message. Needs refills on her Potassium. Only had enough for a couple of weeks. Also needs her Aldactone 50 mg daily and Mag oxide Rx sent to her pharmacy.

## 2020-10-13 NOTE — TELEPHONE ENCOUNTER
----- Message from Emmanuel Keith III, MD sent at 10/13/2020 10:35 AM EDT -----  Potassium levels remain low.  Make sure she is taking her potassium supplements daily.  Increase Aldactone to 50 mg p.o. daily.  Start mag oxide 400 mg p.o. daily.

## 2020-11-24 ENCOUNTER — TELEPHONE (OUTPATIENT)
Dept: INTERNAL MEDICINE | Facility: CLINIC | Age: 55
End: 2020-11-24

## 2020-11-24 NOTE — TELEPHONE ENCOUNTER
PATIENT STATES HER HANDS, FACE, FEET AND TOES ARE SWELLING AND WANTS TO SEE IF IT COULD BE ONE OF HER MEDICATIONS SHE IS TAKING    PLEASE CALL   PATIENT CALL BACK  [phone]

## 2020-11-25 NOTE — TELEPHONE ENCOUNTER
None of her meds should be making her swell. Sounds like she may need to have her fluid pill changed. I need to see her in the office for this.

## 2020-12-03 DIAGNOSIS — F41.8 DEPRESSION WITH ANXIETY: ICD-10-CM

## 2020-12-04 RX ORDER — VENLAFAXINE HYDROCHLORIDE 150 MG/1
CAPSULE, EXTENDED RELEASE ORAL
Qty: 30 CAPSULE | Refills: 1 | Status: SHIPPED | OUTPATIENT
Start: 2020-12-04 | End: 2021-04-01

## 2020-12-11 ENCOUNTER — OFFICE VISIT (OUTPATIENT)
Dept: INTERNAL MEDICINE | Facility: CLINIC | Age: 55
End: 2020-12-11

## 2020-12-11 VITALS
SYSTOLIC BLOOD PRESSURE: 140 MMHG | HEART RATE: 89 BPM | WEIGHT: 148 LBS | TEMPERATURE: 97.1 F | DIASTOLIC BLOOD PRESSURE: 84 MMHG | OXYGEN SATURATION: 94 % | HEIGHT: 63 IN | BODY MASS INDEX: 26.22 KG/M2

## 2020-12-11 DIAGNOSIS — R79.89 LOW TSH LEVEL: ICD-10-CM

## 2020-12-11 DIAGNOSIS — R60.1 GENERALIZED EDEMA: ICD-10-CM

## 2020-12-11 DIAGNOSIS — I10 ESSENTIAL HYPERTENSION: Primary | ICD-10-CM

## 2020-12-11 DIAGNOSIS — E87.6 HYPOKALEMIA: ICD-10-CM

## 2020-12-11 PROCEDURE — 99214 OFFICE O/P EST MOD 30 MIN: CPT | Performed by: NURSE PRACTITIONER

## 2020-12-11 RX ORDER — POTASSIUM CHLORIDE 20 MEQ/1
20 TABLET, EXTENDED RELEASE ORAL DAILY
Qty: 5 TABLET | Refills: 0 | Status: SHIPPED | OUTPATIENT
Start: 2020-12-11 | End: 2020-12-16

## 2020-12-11 RX ORDER — FUROSEMIDE 20 MG/1
20 TABLET ORAL DAILY
Qty: 5 TABLET | Refills: 0 | Status: SHIPPED | OUTPATIENT
Start: 2020-12-11 | End: 2021-01-29

## 2020-12-14 ENCOUNTER — LAB (OUTPATIENT)
Dept: LAB | Facility: HOSPITAL | Age: 55
End: 2020-12-14

## 2020-12-14 DIAGNOSIS — R60.1 GENERALIZED EDEMA: ICD-10-CM

## 2020-12-14 DIAGNOSIS — R79.89 LOW TSH LEVEL: ICD-10-CM

## 2020-12-14 DIAGNOSIS — I10 ESSENTIAL HYPERTENSION: ICD-10-CM

## 2020-12-14 DIAGNOSIS — E87.6 HYPOKALEMIA: ICD-10-CM

## 2020-12-14 LAB
BASOPHILS # BLD AUTO: 0.02 10*3/MM3 (ref 0–0.2)
BASOPHILS NFR BLD AUTO: 0.2 % (ref 0–1.5)
DEPRECATED RDW RBC AUTO: 49.6 FL (ref 37–54)
EOSINOPHIL # BLD AUTO: 0.01 10*3/MM3 (ref 0–0.4)
EOSINOPHIL NFR BLD AUTO: 0.1 % (ref 0.3–6.2)
ERYTHROCYTE [DISTWIDTH] IN BLOOD BY AUTOMATED COUNT: 16.5 % (ref 12.3–15.4)
HCT VFR BLD AUTO: 37.8 % (ref 34–46.6)
HGB BLD-MCNC: 12.6 G/DL (ref 12–15.9)
IMM GRANULOCYTES # BLD AUTO: 0.05 10*3/MM3 (ref 0–0.05)
IMM GRANULOCYTES NFR BLD AUTO: 0.5 % (ref 0–0.5)
LYMPHOCYTES # BLD AUTO: 2.65 10*3/MM3 (ref 0.7–3.1)
LYMPHOCYTES NFR BLD AUTO: 25.8 % (ref 19.6–45.3)
MCH RBC QN AUTO: 28.3 PG (ref 26.6–33)
MCHC RBC AUTO-ENTMCNC: 33.3 G/DL (ref 31.5–35.7)
MCV RBC AUTO: 84.9 FL (ref 79–97)
MONOCYTES # BLD AUTO: 0.82 10*3/MM3 (ref 0.1–0.9)
MONOCYTES NFR BLD AUTO: 8 % (ref 5–12)
NEUTROPHILS NFR BLD AUTO: 6.74 10*3/MM3 (ref 1.7–7)
NEUTROPHILS NFR BLD AUTO: 65.4 % (ref 42.7–76)
NRBC BLD AUTO-RTO: 0.6 /100 WBC (ref 0–0.2)
NT-PROBNP SERPL-MCNC: 2366 PG/ML (ref 0–900)
PLATELET # BLD AUTO: 370 10*3/MM3 (ref 140–450)
PMV BLD AUTO: 10.6 FL (ref 6–12)
RBC # BLD AUTO: 4.45 10*6/MM3 (ref 3.77–5.28)
WBC # BLD AUTO: 10.29 10*3/MM3 (ref 3.4–10.8)

## 2020-12-14 PROCEDURE — 85025 COMPLETE CBC W/AUTO DIFF WBC: CPT

## 2020-12-14 PROCEDURE — 84443 ASSAY THYROID STIM HORMONE: CPT

## 2020-12-14 PROCEDURE — 83880 ASSAY OF NATRIURETIC PEPTIDE: CPT

## 2020-12-14 PROCEDURE — 80053 COMPREHEN METABOLIC PANEL: CPT

## 2020-12-14 PROCEDURE — 83735 ASSAY OF MAGNESIUM: CPT

## 2020-12-15 LAB
ALBUMIN SERPL-MCNC: 4.3 G/DL (ref 3.5–5.2)
ALBUMIN/GLOB SERPL: 1.6 G/DL
ALP SERPL-CCNC: 112 U/L (ref 39–117)
ALT SERPL W P-5'-P-CCNC: 24 U/L (ref 1–33)
ANION GAP SERPL CALCULATED.3IONS-SCNC: 15.2 MMOL/L (ref 5–15)
AST SERPL-CCNC: 26 U/L (ref 1–32)
BILIRUB SERPL-MCNC: 0.5 MG/DL (ref 0–1.2)
BUN SERPL-MCNC: 12 MG/DL (ref 6–20)
BUN/CREAT SERPL: 12.8 (ref 7–25)
CALCIUM SPEC-SCNC: 10.3 MG/DL (ref 8.6–10.5)
CHLORIDE SERPL-SCNC: 95 MMOL/L (ref 98–107)
CO2 SERPL-SCNC: 29.8 MMOL/L (ref 22–29)
CREAT SERPL-MCNC: 0.94 MG/DL (ref 0.57–1)
GFR SERPL CREATININE-BSD FRML MDRD: 75 ML/MIN/1.73
GLOBULIN UR ELPH-MCNC: 2.7 GM/DL
GLUCOSE SERPL-MCNC: 93 MG/DL (ref 65–99)
MAGNESIUM SERPL-MCNC: 1.9 MG/DL (ref 1.6–2.6)
POTASSIUM SERPL-SCNC: 3.3 MMOL/L (ref 3.5–5.2)
PROT SERPL-MCNC: 7 G/DL (ref 6–8.5)
SODIUM SERPL-SCNC: 140 MMOL/L (ref 136–145)
TSH SERPL DL<=0.05 MIU/L-ACNC: 0.85 UIU/ML (ref 0.27–4.2)

## 2020-12-16 DIAGNOSIS — R79.89 ELEVATED BRAIN NATRIURETIC PEPTIDE (BNP) LEVEL: Primary | ICD-10-CM

## 2020-12-16 DIAGNOSIS — E87.6 HYPOKALEMIA: ICD-10-CM

## 2020-12-16 RX ORDER — POTASSIUM CHLORIDE 1500 MG/1
20 TABLET, FILM COATED, EXTENDED RELEASE ORAL DAILY
Qty: 30 TABLET | Refills: 2 | Status: SHIPPED | OUTPATIENT
Start: 2020-12-16 | End: 2022-03-28

## 2020-12-16 NOTE — PROGRESS NOTES
Spoke to patient about labs and elevated BNP indicative of heart failure. Starting lasix has helped with swelling. She still has some edema but this has improved. She has been taking 20meq of potassium with lasix. She was only taking 10meq before labs. Due to labs and swelling she will continue Lasix 20mg daily and potassium 20meq daily. She will come by in 2 days for lab repeat. She will contact Dr. Keith office and request sooner appointment.

## 2020-12-17 ENCOUNTER — TELEPHONE (OUTPATIENT)
Dept: CARDIOLOGY | Facility: CLINIC | Age: 55
End: 2020-12-17

## 2020-12-17 DIAGNOSIS — E87.6 HYPOKALEMIA: Primary | ICD-10-CM

## 2020-12-17 NOTE — TELEPHONE ENCOUNTER
Was told by her PCP to call our office to have appt moved up. Abnormal labs. [place] is putting her on for 12/28. Please advise if any further instructions.

## 2020-12-17 NOTE — TELEPHONE ENCOUNTER
Do not put her in on the 28th.  Just have her come in for BMP early that morning and have her call in a blood pressure log into us.

## 2020-12-28 ENCOUNTER — LAB (OUTPATIENT)
Dept: LAB | Facility: HOSPITAL | Age: 55
End: 2020-12-28

## 2020-12-28 DIAGNOSIS — E87.6 HYPOKALEMIA: ICD-10-CM

## 2020-12-28 LAB
ANION GAP SERPL CALCULATED.3IONS-SCNC: 12.5 MMOL/L (ref 5–15)
BUN SERPL-MCNC: 9 MG/DL (ref 6–20)
BUN/CREAT SERPL: 14.5 (ref 7–25)
CALCIUM SPEC-SCNC: 9.3 MG/DL (ref 8.6–10.5)
CHLORIDE SERPL-SCNC: 108 MMOL/L (ref 98–107)
CO2 SERPL-SCNC: 25.5 MMOL/L (ref 22–29)
CREAT SERPL-MCNC: 0.62 MG/DL (ref 0.57–1)
GFR SERPL CREATININE-BSD FRML MDRD: 122 ML/MIN/1.73
GLUCOSE SERPL-MCNC: 89 MG/DL (ref 65–99)
POTASSIUM SERPL-SCNC: 3.8 MMOL/L (ref 3.5–5.2)
SODIUM SERPL-SCNC: 146 MMOL/L (ref 136–145)

## 2020-12-28 PROCEDURE — 80048 BASIC METABOLIC PNL TOTAL CA: CPT

## 2020-12-28 PROCEDURE — 36415 COLL VENOUS BLD VENIPUNCTURE: CPT

## 2021-01-02 NOTE — PROGRESS NOTES
Subjective   Chief Complaint   Patient presents with   • Follow-up     stomach and feet swollen       Jamaica Gema Shell is a 55 y.o. female here today for hypertension, swelling, and edema.  Patient has hypertension that has been difficult to control in the past.  Blood pressure is recently been well managed.  She began having swelling about one week ago and this has progressed. She has swelling in lower extremities, face, and abdomen feels distended. She has been drinking adequate amount of water. She's had hypokalemia in the past and has been taking supplement and following a high potassium diet. Last set of labs showed low TSH and this need to be repeated. No shortness of breath or chest pain. Feels very fatigued. She has been seeing cardiology and diuretics were reduced spironolactone.    I have reviewed the following portions of the patient's history and confirmed they are accurate: allergies, current medications, past family history, past medical history, past social history, past surgical history and problem list    I have personally completed the patient's review of systems.    Review of Systems   Constitutional: Positive for fatigue. Negative for activity change, appetite change, chills, diaphoresis, fever, unexpected weight gain and unexpected weight loss.   HENT: Positive for facial swelling. Negative for ear discharge, ear pain, hearing loss, mouth sores, nosebleeds, sinus pressure, sneezing and sore throat.    Eyes: Negative for pain, discharge and itching.   Respiratory: Negative for cough, chest tightness, shortness of breath and wheezing.    Cardiovascular: Positive for leg swelling. Negative for chest pain and palpitations.   Gastrointestinal: Positive for abdominal distention. Negative for abdominal pain, constipation, diarrhea, nausea and vomiting.   Endocrine: Negative for heat intolerance, polydipsia and polyphagia.   Genitourinary: Negative for dysuria, flank pain, frequency, hematuria and  "urgency.   Musculoskeletal: Positive for arthralgias, back pain and myalgias. Negative for gait problem, joint swelling, neck pain and neck stiffness.   Skin: Negative for color change, pallor and rash.   Neurological: Negative for seizures, speech difficulty, weakness, numbness and headache.   Psychiatric/Behavioral: Positive for stress. Negative for agitation, decreased concentration, sleep disturbance, suicidal ideas and depressed mood. The patient is not nervous/anxious.        Current Outpatient Medications on File Prior to Visit   Medication Sig   • albuterol sulfate  (90 Base) MCG/ACT inhaler 2 puffs q4-6 hr prn cough, shortness of breath or wheezing   • atorvastatin (LIPITOR) 10 MG tablet Take 1 tablet by mouth Every Night.   • bisoprolol (ZEBeta) 10 MG tablet Take 1 tablet by mouth Daily.   • lisinopril (PRINIVIL,ZESTRIL) 40 MG tablet Take 1 tablet by mouth Daily.   • magnesium oxide (MAG-OX) 400 MG tablet Take 1 tablet by mouth Daily.   • QUEtiapine (SEROquel) 25 MG tablet Take 1-2 tabs nightly for insomnia   • spironolactone (Aldactone) 50 MG tablet Take 1 tablet by mouth Daily.   • venlafaxine XR (EFFEXOR-XR) 150 MG 24 hr capsule TAKE ONE CAPSULE BY MOUTH DAILY   • vitamin D (ERGOCALCIFEROL) 1.25 MG (95258 UT) capsule capsule Take 1 capsule by mouth 1 (One) Time Per Week.     No current facility-administered medications on file prior to visit.        Objective   Vitals:    12/11/20 1608   BP: 140/84   Pulse: 89   Temp: 97.1 °F (36.2 °C)   TempSrc: Temporal   SpO2: 94%   Weight: 67.1 kg (148 lb)   Height: 160 cm (63\")     Body mass index is 26.22 kg/m².    Physical Exam  Vitals signs reviewed.   Constitutional:       Appearance: Normal appearance. She is well-developed.   HENT:      Head: Normocephalic and atraumatic.      Nose: Nose normal.   Eyes:      General: Lids are normal.      Conjunctiva/sclera: Conjunctivae normal.      Pupils: Pupils are equal, round, and reactive to light.   Neck:      " Thyroid: No thyromegaly.      Trachea: Trachea normal.   Cardiovascular:      Rate and Rhythm: Normal rate and regular rhythm.      Heart sounds: Normal heart sounds.   Pulmonary:      Effort: Pulmonary effort is normal. No respiratory distress.      Breath sounds: Normal breath sounds.   Musculoskeletal:      Right lower le+ Edema present.      Left lower le+ Edema present.   Skin:     General: Skin is warm and dry.   Neurological:      Mental Status: She is alert and oriented to person, place, and time.      GCS: GCS eye subscore is 4. GCS verbal subscore is 5. GCS motor subscore is 6.   Psychiatric:         Attention and Perception: Attention normal.         Mood and Affect: Mood normal.         Speech: Speech normal.         Behavior: Behavior normal. Behavior is cooperative.         Thought Content: Thought content normal.         Assessment/Plan   Problem List Items Addressed This Visit        Cardiac and Vasculature    Essential hypertension - Primary    Overview     Chronic issue unstable requiring medication management and monitoring. Will eat well balanced heart healthy diet, drink adequate water, increase physical activity, and get adequate rest. Monitor blood pressures daily and contact office for any readings consistently above 140/90. Patient will report any associated symptoms such as headaches, blurry vision, or nausea. Patient will go to ER for any chest pressure or chest pain.   Continue lisinopril, bisoprolol, and spironalactone. Start lasix.             Genitourinary and Reproductive     Hypokalemia  Recurrent issue requiring close monitoring, high potassium diet, and potassium supplement. Will report any weakness or muscle cramping.     Relevant Orders    Comprehensive Metabolic Panel (Completed)    Magnesium (Completed)      Other Visit Diagnoses     Generalized edema      New issue requiring further work up and monitoring. Start Lasix, continue spironolactone and potassium. Drink  adequate water. Schedule follow up with cardiologist asap.     Relevant Orders    CBC & Differential (Completed)    Comprehensive Metabolic Panel (Completed)    proBNP (Completed)    Low TSH level        Relevant Orders    TSH Rfx On Abnormal To Free T4 (Completed)             Current Outpatient Medications:   •  albuterol sulfate  (90 Base) MCG/ACT inhaler, 2 puffs q4-6 hr prn cough, shortness of breath or wheezing, Disp: 18 g, Rfl: 0  •  atorvastatin (LIPITOR) 10 MG tablet, Take 1 tablet by mouth Every Night., Disp: 30 tablet, Rfl: 5  •  bisoprolol (ZEBeta) 10 MG tablet, Take 1 tablet by mouth Daily., Disp: 30 tablet, Rfl: 2  •  furosemide (Lasix) 20 MG tablet, Take 1 tablet by mouth Daily for 5 days., Disp: 5 tablet, Rfl: 0  •  lisinopril (PRINIVIL,ZESTRIL) 40 MG tablet, Take 1 tablet by mouth Daily., Disp: 30 tablet, Rfl: 5  •  magnesium oxide (MAG-OX) 400 MG tablet, Take 1 tablet by mouth Daily., Disp: 90 tablet, Rfl: 0  •  potassium chloride ER (K-TAB) 20 MEQ tablet controlled-release ER tablet, Take 1 tablet by mouth Daily., Disp: 30 tablet, Rfl: 2  •  QUEtiapine (SEROquel) 25 MG tablet, Take 1-2 tabs nightly for insomnia, Disp: 30 tablet, Rfl: 2  •  spironolactone (Aldactone) 50 MG tablet, Take 1 tablet by mouth Daily., Disp: 90 tablet, Rfl: 1  •  venlafaxine XR (EFFEXOR-XR) 150 MG 24 hr capsule, TAKE ONE CAPSULE BY MOUTH DAILY, Disp: 30 capsule, Rfl: 1  •  vitamin D (ERGOCALCIFEROL) 1.25 MG (50884 UT) capsule capsule, Take 1 capsule by mouth 1 (One) Time Per Week., Disp: 4 capsule, Rfl: 2       Plan of care reviewed with the patient at the conclusion of today's visit.  Education was provided regarding diagnosis, management, and any prescribed or recommended OTC medications.  Patient verbalized understanding of and agreement with management plan.     No follow-ups on file.      MARTIN Ramirez    Please note that portions of this note were completed with a voice recognition program.  Efforts were made to edit the dictations, but occasionally words are mistranscribed.

## 2021-01-07 ENCOUNTER — TELEPHONE (OUTPATIENT)
Dept: INTERNAL MEDICINE | Facility: CLINIC | Age: 56
End: 2021-01-07

## 2021-01-07 NOTE — TELEPHONE ENCOUNTER
I looked at the notes in her chart. Looks like dr. Keith wants her to call in blood pressure log to his office based on encounter note on 12/17

## 2021-01-07 NOTE — TELEPHONE ENCOUNTER
Patient called and stated she was suppose to take her blood pressure every day     01/04//78 HR 84  01/05//80 HR 94  01/06//109     Also patient hands and feet are swelling along with stomach and neck.     Please call and advise [phone]

## 2021-01-14 DIAGNOSIS — I10 ESSENTIAL HYPERTENSION: ICD-10-CM

## 2021-01-14 RX ORDER — BISOPROLOL FUMARATE 10 MG/1
TABLET, FILM COATED ORAL
Qty: 30 TABLET | Refills: 5 | Status: SHIPPED | OUTPATIENT
Start: 2021-01-14 | End: 2022-02-16 | Stop reason: SDUPTHER

## 2021-01-29 ENCOUNTER — TELEPHONE (OUTPATIENT)
Dept: INTERNAL MEDICINE | Facility: CLINIC | Age: 56
End: 2021-01-29

## 2021-01-29 NOTE — TELEPHONE ENCOUNTER
Patient called the oncall service stating blood pressure is 80/60 and heart rate is 120. She feels weak and tired. Discussed patient's meds with her and she is taking some old scripts of diuretics. She is taking 2 scripts of spironolactone with total of 75mg and HCTZ 25mg. Patient was instructed to only take spironolactone 25mg and throw away all other diuretic scripts and increase water intake. She is still taking lisinopril 40mg and bisoprolol 10mg. She sees cardiologist, Dr. Keith, on Monday. She has appt with this provider in March. Suggested she schedule earlier appt to go over all meds.

## 2021-02-01 ENCOUNTER — OFFICE VISIT (OUTPATIENT)
Dept: CARDIOLOGY | Facility: CLINIC | Age: 56
End: 2021-02-01

## 2021-02-01 VITALS
DIASTOLIC BLOOD PRESSURE: 72 MMHG | HEART RATE: 102 BPM | OXYGEN SATURATION: 98 % | SYSTOLIC BLOOD PRESSURE: 110 MMHG | BODY MASS INDEX: 26.29 KG/M2 | WEIGHT: 148.4 LBS | HEIGHT: 63 IN

## 2021-02-01 DIAGNOSIS — I47.1 PAT (PAROXYSMAL ATRIAL TACHYCARDIA) (HCC): ICD-10-CM

## 2021-02-01 DIAGNOSIS — I10 ESSENTIAL HYPERTENSION: Primary | ICD-10-CM

## 2021-02-01 DIAGNOSIS — E78.00 PURE HYPERCHOLESTEROLEMIA: ICD-10-CM

## 2021-02-01 PROCEDURE — 99214 OFFICE O/P EST MOD 30 MIN: CPT | Performed by: INTERNAL MEDICINE

## 2021-02-01 NOTE — PROGRESS NOTES
[facility] at [facility]  Office visit  Jamaica Shell  1965  [phone]  There is no work phone number on file.    VISIT DATE:  2/1/2021    PCP: Candace Cam, APRN  [id number] [address]    CC:  Chief Complaint   Patient presents with   • Hypertension     f/u       Previous cardiac studies and procedures:  May 2020   Echo  · Left ventricular systolic function is normal.  · Estimated EF appears to be in the range of 56 - 60%.  · Left ventricular wall thickness is consistent with mild concentric hypertrophy.    30-day event monitor  Episodes of PSVT, likely nonsustained atrial tachycardia, 8-25 beats in duration.    June 2020 stress echocardiogram  · Pt denied chest pain, pressure or SOA.  · Expected exercise time: 7:45 actual time: 5:00  · Hypertensive response to exercise with a blood pressure of 210/110 mmHg.  · Left ventricular systolic function is mildly decreased.  · Calculated EF = 41%. Estimated EF appears to be in the range of 46 - 50%.  · Left ventricular wall thickness is consistent with mild concentric hypertrophy.  · Left ventricular diastolic dysfunction (grade I) consistent with impaired relaxation.  · Small fixed distal anterior lateral wall defect either secondary to small region of infarction versus underlying hypertensive cardiomyopathy. No definitive ischemia visualized.    ASSESSMENT:   Diagnosis Plan   1. Essential hypertension     2. PAT (paroxysmal atrial tachycardia) (CMS/HCC)     3. Pure hypercholesterolemia         PLAN:  PSVT: Nonsustained atrial tachycardia.  Offered reassurance.  Limited symptoms.  Continue bisoprolol 10 mg p.o. daily.     Heart failure with borderline ejection fraction, chronic: Suspect secondary to hypertensive cardiomyopathy.  Continue to titrate medical therapy.     Hypertension: Goal is 130/80 mmHg.    Avoiding calcium channel blockade due to lower extremity edema.  History of persistent hypokalemia,  negative hyperaldosterone eval.  Continue lisinopril and bisoprolol.  We will add back spironolactone 25 mg p.o. daily if blood pressures trend upward.  She was instructed to bring all of her pill bottles to her primary care provider's visit so that they can be reviewed.    Hyperlipidemia: Goal LDL less than 130, ideally less than 100.    Continue statin therapy.    Subjective  Initial evaluation: Seen with episodes of hypotension with systolic blood pressures recorded as low as 80/60 mmHg.  She was apparently doubling up on some of her diuretics.  Primary care provider recently had her stop her hydrochlorothiazide and she was supposed to decrease her spironolactone to 25 mg p.o. daily.  Currently it appears she is only taking her lisinopril bisoprolol.  Blood pressures are still running less than 115/75 mmHg.    Initial evaluation:54-year-old -American female with history of hypertension and dyslipidemia presenting with worsening bilateral lower extreme edema, palpitations and dyspnea on exertion.  Describes intermittent episodes of a racing heartbeat in which it feels like her heart is beating fast and hard.  No obvious triggers.  Chest does feel uncomfortable during these episodes.  Often at rest.  She does describe some shortness of breath with activity and a class II pattern.  Denies exertional dyspnea.  Has a significant dyslipidemia with an LDL as high as 220 over the past 2 years, she was recently started on low-dose atorvastatin proximately 2 months ago.  She describes bilateral lower extremity edema which gradually worsens during the day.  She appears compliant with medical therapy.  Lisinopril hydrochlorothiazide was recently increased to 20-25 mg p.o. daily and amlodipine was recently discontinued.  She also has intermittent anxiety for which she is being treated.    PHYSICAL EXAMINATION:  Vitals:    02/01/21 1417   BP: 110/72   BP Location: Left arm   Patient Position: Sitting   Pulse: 102  "  SpO2: 98%   Weight: 67.3 kg (148 lb 6.4 oz)   Height: 160 cm (63\")     General Appearance:    Alert, cooperative, no distress, appears stated age   Head:    Normocephalic, without obvious abnormality, atraumatic   Eyes:    conjunctiva/corneas clear   Nose:   Nares normal, septum midline, mucosa normal, no drainage   Throat:   Lips, teeth and gums normal   Neck:   Supple, symmetrical, trachea midline, no carotid    bruit or JVD   Lungs:     Clear to auscultation bilaterally, respirations unlabored   Chest Wall:    No tenderness or deformity    Heart:    Regular rate and rhythm, S1 and S2 normal, no murmur, rub   or gallop, normal carotid impulse bilaterally without bruit.   Abdomen:     Soft, non-tender   Extremities:   Extremities normal, atraumatic, no cyanosis or edema   Pulses:   2+ and symmetric all extremities   Skin:   Skin color, texture, turgor normal, no rashes or lesions       Diagnostic Data:  Procedures  Lab Results   Component Value Date    TRIG 200 (H) 08/03/2020    HDL 89 (H) 08/03/2020     Lab Results   Component Value Date    GLUCOSE 89 12/28/2020    BUN 9 12/28/2020    CREATININE 0.62 12/28/2020     (H) 12/28/2020    K 3.8 12/28/2020     (H) 12/28/2020    CO2 25.5 12/28/2020     Lab Results   Component Value Date    HGBA1C 5.20 08/03/2020     Lab Results   Component Value Date    WBC 10.29 12/14/2020    HGB 12.6 12/14/2020    HCT 37.8 12/14/2020     12/14/2020       Allergies  No Known Allergies    Current Medications    Current Outpatient Medications:   •  albuterol sulfate  (90 Base) MCG/ACT inhaler, 2 puffs q4-6 hr prn cough, shortness of breath or wheezing, Disp: 18 g, Rfl: 0  •  atorvastatin (LIPITOR) 10 MG tablet, Take 1 tablet by mouth Every Night., Disp: 30 tablet, Rfl: 5  •  bisoprolol (ZEBeta) 10 MG tablet, TAKE ONE TABLET BY MOUTH DAILY, Disp: 30 tablet, Rfl: 5  •  lisinopril (PRINIVIL,ZESTRIL) 40 MG tablet, Take 1 tablet by mouth Daily., Disp: 30 tablet, Rfl: " 5  •  magnesium oxide (MAGOX) 400 (241.3 Mg) MG tablet tablet, TAKE ONE TABLET BY MOUTH DAILY, Disp: 90 tablet, Rfl: 0  •  potassium chloride ER (K-TAB) 20 MEQ tablet controlled-release ER tablet, Take 1 tablet by mouth Daily., Disp: 30 tablet, Rfl: 2  •  QUEtiapine (SEROquel) 25 MG tablet, Take 1-2 tabs nightly for insomnia, Disp: 30 tablet, Rfl: 2  •  venlafaxine XR (EFFEXOR-XR) 150 MG 24 hr capsule, TAKE ONE CAPSULE BY MOUTH DAILY, Disp: 30 capsule, Rfl: 1  •  vitamin D (ERGOCALCIFEROL) 1.25 MG (56636 UT) capsule capsule, Take 1 capsule by mouth 1 (One) Time Per Week., Disp: 4 capsule, Rfl: 2          ROS  Review of Systems   Cardiovascular: Positive for dyspnea on exertion, leg swelling and palpitations. Negative for chest pain and near-syncope.   Respiratory: Negative for cough and snoring.        SOCIAL HX  Social History     Socioeconomic History   • Marital status: Single     Spouse name: Not on file   • Number of children: Not on file   • Years of education: Not on file   • Highest education level: Not on file   Tobacco Use   • Smoking status: Current Every Day Smoker     Packs/day: 0.50     Types: Cigarettes   • Smokeless tobacco: Never Used   Substance and Sexual Activity   • Alcohol use: Yes     Comment: occas   • Drug use: No   • Sexual activity: Defer       FAMILY HX  Family History   Problem Relation Age of Onset   • Diabetes Mother    • No Known Problems Father              Emmanuel Keith III, MD, [facility]

## 2021-03-16 LAB
ALDOST SERPL-MCNC: <1 NG/DL (ref 0–30)
RENIN PLAS-CCNC: 0.23 NG/ML/HR (ref 0.17–5.38)

## 2021-03-19 RX ORDER — ATORVASTATIN CALCIUM 10 MG/1
TABLET, FILM COATED ORAL
Qty: 30 TABLET | Refills: 4 | Status: SHIPPED | OUTPATIENT
Start: 2021-03-19 | End: 2021-04-09

## 2021-04-01 ENCOUNTER — OFFICE VISIT (OUTPATIENT)
Dept: INTERNAL MEDICINE | Facility: CLINIC | Age: 56
End: 2021-04-01

## 2021-04-01 ENCOUNTER — LAB (OUTPATIENT)
Dept: LAB | Facility: HOSPITAL | Age: 56
End: 2021-04-01

## 2021-04-01 VITALS
SYSTOLIC BLOOD PRESSURE: 138 MMHG | WEIGHT: 140 LBS | HEART RATE: 101 BPM | OXYGEN SATURATION: 99 % | RESPIRATION RATE: 16 BRPM | BODY MASS INDEX: 24.8 KG/M2 | TEMPERATURE: 96.9 F | HEIGHT: 63 IN | DIASTOLIC BLOOD PRESSURE: 86 MMHG

## 2021-04-01 DIAGNOSIS — E83.42 HYPOMAGNESEMIA: ICD-10-CM

## 2021-04-01 DIAGNOSIS — E78.00 PURE HYPERCHOLESTEROLEMIA: ICD-10-CM

## 2021-04-01 DIAGNOSIS — R79.89 ELEVATED BRAIN NATRIURETIC PEPTIDE (BNP) LEVEL: ICD-10-CM

## 2021-04-01 DIAGNOSIS — E55.9 VITAMIN D DEFICIENCY: ICD-10-CM

## 2021-04-01 DIAGNOSIS — F41.8 DEPRESSION WITH ANXIETY: ICD-10-CM

## 2021-04-01 DIAGNOSIS — R41.3 MEMORY LOSS: ICD-10-CM

## 2021-04-01 DIAGNOSIS — F51.01 PRIMARY INSOMNIA: ICD-10-CM

## 2021-04-01 DIAGNOSIS — H53.8 BLURRY VISION: ICD-10-CM

## 2021-04-01 DIAGNOSIS — I10 ESSENTIAL HYPERTENSION: Primary | ICD-10-CM

## 2021-04-01 DIAGNOSIS — E87.6 HYPOKALEMIA: ICD-10-CM

## 2021-04-01 DIAGNOSIS — R79.89 LOW TSH LEVEL: ICD-10-CM

## 2021-04-01 DIAGNOSIS — E53.9 VITAMIN B DEFICIENCY: ICD-10-CM

## 2021-04-01 LAB — NT-PROBNP SERPL-MCNC: 78.1 PG/ML (ref 0–900)

## 2021-04-01 PROCEDURE — 80061 LIPID PANEL: CPT | Performed by: NURSE PRACTITIONER

## 2021-04-01 PROCEDURE — 80053 COMPREHEN METABOLIC PANEL: CPT | Performed by: NURSE PRACTITIONER

## 2021-04-01 PROCEDURE — 99214 OFFICE O/P EST MOD 30 MIN: CPT | Performed by: NURSE PRACTITIONER

## 2021-04-01 PROCEDURE — 83880 ASSAY OF NATRIURETIC PEPTIDE: CPT

## 2021-04-01 PROCEDURE — 84443 ASSAY THYROID STIM HORMONE: CPT | Performed by: NURSE PRACTITIONER

## 2021-04-01 PROCEDURE — 82746 ASSAY OF FOLIC ACID SERUM: CPT | Performed by: NURSE PRACTITIONER

## 2021-04-01 PROCEDURE — 85027 COMPLETE CBC AUTOMATED: CPT | Performed by: NURSE PRACTITIONER

## 2021-04-01 PROCEDURE — 82306 VITAMIN D 25 HYDROXY: CPT | Performed by: NURSE PRACTITIONER

## 2021-04-01 PROCEDURE — 83036 HEMOGLOBIN GLYCOSYLATED A1C: CPT | Performed by: NURSE PRACTITIONER

## 2021-04-01 PROCEDURE — 83735 ASSAY OF MAGNESIUM: CPT

## 2021-04-01 PROCEDURE — 82607 VITAMIN B-12: CPT | Performed by: NURSE PRACTITIONER

## 2021-04-01 RX ORDER — FLUOXETINE HYDROCHLORIDE 20 MG/1
20 CAPSULE ORAL DAILY
Qty: 30 CAPSULE | Refills: 1 | Status: SHIPPED | OUTPATIENT
Start: 2021-04-01 | End: 2021-07-19

## 2021-04-01 RX ORDER — SPIRONOLACTONE 25 MG/1
25 TABLET ORAL DAILY
Start: 2021-04-01 | End: 2022-02-16 | Stop reason: SDUPTHER

## 2021-04-01 RX ORDER — HYDROCHLOROTHIAZIDE 25 MG/1
1 TABLET ORAL DAILY
COMMUNITY
Start: 2021-03-19 | End: 2021-04-01

## 2021-04-01 RX ORDER — ERGOCALCIFEROL 1.25 MG/1
50000 CAPSULE ORAL WEEKLY
Qty: 4 CAPSULE | Refills: 2 | Status: SHIPPED | OUTPATIENT
Start: 2021-04-01 | End: 2022-02-16

## 2021-04-01 RX ORDER — SPIRONOLACTONE 50 MG/1
50 TABLET, FILM COATED ORAL DAILY
Qty: 30 TABLET | Refills: 2
Start: 2021-04-01 | End: 2021-04-01 | Stop reason: SDUPTHER

## 2021-04-01 RX ORDER — AMITRIPTYLINE HYDROCHLORIDE 25 MG/1
25 TABLET, FILM COATED ORAL NIGHTLY
COMMUNITY
End: 2021-07-19

## 2021-04-01 NOTE — PROGRESS NOTES
"Subjective   Chief Complaint   Patient presents with   • Hypertension     f/u   • Medication Problem     \"One of my medications is making my hair fall out and me swell from head to toe\"      Jamaica Shell is a 55 y.o. female here today for hypertension, hyperlipidemia, memory loss, blurry vision, and hair loss. Patient has not felt well recently. Friend, licha, is present for appt. She had recent visit with cardiologist and is confused about her medications. She brought in two containers and has been taking some that were prescribed in the past. She is not taking some that have been recently prescribed. Blood pressure has been at goal recently. No shortness of breath or chest pain. She is following a low cholesterol and fat diet. Trying to be physically active. Experiencing memory loss and blurry vision. Friend states her memory has worsened over the past year and she is very forgettable even with daily routine. Hair has been falling out when brushing and in the shower. Has chronically low potassium and magnesium. Has not seen ophthalmologist in years.  Depression and anxiety have been worse recently she is no longer taking Effexor.  She has not been taking amitriptyline at bedtime.  She is not sleeping well.  She has difficulty falling asleep and wakes up frequently throughout the night.  She does snore and sleeps with mouth open.    I have reviewed the following portions of the patient's history and confirmed they are accurate: allergies, current medications, past family history, past medical history, past social history, past surgical history and problem list    I have personally completed the patient's review of systems.    Review of Systems   Constitutional: Positive for fatigue. Negative for activity change, appetite change, chills, diaphoresis, fever, unexpected weight gain and unexpected weight loss.   HENT: Negative for ear discharge, ear pain, hearing loss, mouth sores, nosebleeds, sinus pressure, " sneezing and sore throat.    Eyes: Positive for blurred vision. Negative for pain, discharge and itching.   Respiratory: Negative for cough, chest tightness, shortness of breath and wheezing.    Cardiovascular: Negative for chest pain, palpitations and leg swelling.   Gastrointestinal: Negative for abdominal pain, constipation, diarrhea, nausea and vomiting.   Endocrine: Negative for heat intolerance, polydipsia and polyphagia.   Genitourinary: Negative for dysuria, flank pain, frequency, hematuria and urgency.   Musculoskeletal: Positive for arthralgias, back pain and myalgias. Negative for gait problem, joint swelling, neck pain and neck stiffness.   Skin: Negative for color change, pallor and rash.        Hair loss   Neurological: Positive for memory problem. Negative for seizures, speech difficulty, weakness, numbness and headache.   Psychiatric/Behavioral: Positive for sleep disturbance, depressed mood and stress. Negative for agitation, decreased concentration and suicidal ideas. The patient is nervous/anxious.        Current Outpatient Medications on File Prior to Visit   Medication Sig   • albuterol sulfate  (90 Base) MCG/ACT inhaler 2 puffs q4-6 hr prn cough, shortness of breath or wheezing   • amitriptyline (ELAVIL) 25 MG tablet Take 25 mg by mouth Every Night.   • atorvastatin (LIPITOR) 10 MG tablet TAKE ONE TABLET BY MOUTH ONCE NIGHTLY   • bisoprolol (ZEBeta) 10 MG tablet TAKE ONE TABLET BY MOUTH DAILY   • lisinopril (PRINIVIL,ZESTRIL) 40 MG tablet Take 1 tablet by mouth Daily.   • potassium chloride ER (K-TAB) 20 MEQ tablet controlled-release ER tablet Take 1 tablet by mouth Daily.   • [DISCONTINUED] hydroCHLOROthiazide (HYDRODIURIL) 25 MG tablet Take 1 tablet by mouth Daily.   • [DISCONTINUED] magnesium oxide (MAGOX) 400 (241.3 Mg) MG tablet tablet TAKE ONE TABLET BY MOUTH DAILY   • [DISCONTINUED] QUEtiapine (SEROquel) 25 MG tablet Take 1-2 tabs nightly for insomnia   • [DISCONTINUED]  "venlafaxine XR (EFFEXOR-XR) 150 MG 24 hr capsule TAKE ONE CAPSULE BY MOUTH DAILY   • [DISCONTINUED] vitamin D (ERGOCALCIFEROL) 1.25 MG (67088 UT) capsule capsule Take 1 capsule by mouth 1 (One) Time Per Week.     No current facility-administered medications on file prior to visit.       Objective   Vitals:    04/01/21 0858   BP: 138/86   Pulse: 101   Resp: 16   Temp: 96.9 °F (36.1 °C)   TempSrc: Temporal   SpO2: 99%   Weight: 63.5 kg (140 lb)   Height: 160 cm (63\")     Body mass index is 24.8 kg/m².    Physical Exam  Vitals reviewed.   Constitutional:       Appearance: Normal appearance. She is well-developed.   HENT:      Head: Normocephalic and atraumatic.      Nose: Nose normal.   Eyes:      General: Lids are normal.      Conjunctiva/sclera: Conjunctivae normal.      Pupils: Pupils are equal, round, and reactive to light.   Neck:      Thyroid: No thyromegaly.      Trachea: Trachea normal.   Cardiovascular:      Rate and Rhythm: Normal rate and regular rhythm.      Heart sounds: Normal heart sounds.   Pulmonary:      Effort: Pulmonary effort is normal. No respiratory distress.      Breath sounds: Normal breath sounds.   Skin:     General: Skin is warm and dry.   Neurological:      Mental Status: She is alert and oriented to person, place, and time.      GCS: GCS eye subscore is 4. GCS verbal subscore is 5. GCS motor subscore is 6.   Psychiatric:         Attention and Perception: Attention normal.         Mood and Affect: Mood is anxious and depressed.         Speech: Speech normal.         Behavior: Behavior normal. Behavior is cooperative.         Thought Content: Thought content normal.         Cognition and Memory: Memory is impaired.         Assessment/Plan   Problem List Items Addressed This Visit        Cardiac and Vasculature    Essential hypertension - Primary    Overview     Chronic issue stable requiring medication management and monitoring. Will eat well balanced heart healthy diet, drink adequate " water, increase physical activity, and get adequate rest. Monitor blood pressures daily and contact office for any readings consistently above 140/90. Patient will report any associated symptoms such as headaches, blurry vision, or nausea. Patient will go to ER for any chest pressure or chest pain.   Continue lisinopril, bisoprolol, and spironalactone.          Relevant Medications    spironolactone (Aldactone) 25 MG tablet    Other Relevant Orders    CBC (No Diff)    Comprehensive Metabolic Panel    Lipid Panel    Pure hypercholesterolemia    Overview     Chronic unstable requiring medication management, lifestyle changes, and monitoring. Discussed how this being unstable increases risk of cardiovascular disease and adverse events. Will follow a [facility] healthy diet low in cholesterol and fat. Discussed increasing fiber intake. Suggested fish oil or omega 3 supplement of 1200mg twice daily. Educated on how these help lower triglycerides and LDL. Will drink adequate water and increase physical activity as tolerated. Discussed importance of medication compliance.   Continue lipitor.          Relevant Orders    Lipid Panel       Endocrine and Metabolic    Vitamin D deficiency    Overview     Chronic issue requiring diet changes, monitoring, and dietary supplement. Will increase dietary intake of Vitamin D through dairy milk, plant/nut based milk, and fortified foods such as juice and cereal. Will start dietary supplement as directed.            Relevant Orders    Vitamin D 25 Hydroxy       Mental Health    Depression with anxiety    Overview     Chronic issue unstable requiring medication management and monitoring. Will eat well balanced diet, increase water intake, increase physical activity during the day, and get adequate rest. Discussed relaxation and coping skills and exercises.   Start Prozac and restart amitriptyline.           Relevant Medications    amitriptyline (ELAVIL) 25 MG tablet    FLUoxetine (PROzac) 20  MG capsule      Other Visit Diagnoses     Primary insomnia      Chronic issue unstable requiring medication management and monitoring. Will eat well balanced diet, drink adequate water decreasing caffeine intake, and increase physical activity during the day. Discussed relaxation and coping skills and exercises. Discussed sleep hygiene and limiting electronic devices prior to bedtime.    Restart amitriptyline.  Consider referral to sleep medicine for sleep apnea eval      Relevant Medications    amitriptyline (ELAVIL) 25 MG tablet    Memory loss      New issue unstable requiring further work-up and monitoring.  Concern for ischemic vessel disease or possible TIA in the past.  Consider referral to neurology or sleep medicine.      Relevant Orders    MRI Brain Without Contrast    CBC (No Diff)    Comprehensive Metabolic Panel    Hemoglobin A1c    Blurry vision      New issue unstable requiring further work-up and monitoring.  Will schedule appointment with ophthalmologist.      Relevant Orders    MRI Brain Without Contrast    CBC (No Diff)    Comprehensive Metabolic Panel    Hemoglobin A1c        Low TSH level        Relevant Orders    TSH Rfx On Abnormal To Free T4    Vitamin B deficiency      Chronic issue requiring diet changes, monitoring, and dietary supplement. Will increase dietary intake of Vitamin B through animal products and/or vitamin B12 or B complex supplement daily.      Relevant Orders    Vitamin B12 & Folate         All medications brought in and reviewed. Threw out many old scripts and educated on what she needs to be taking currently. Left with correct medications in bag.     Current Outpatient Medications:   •  albuterol sulfate  (90 Base) MCG/ACT inhaler, 2 puffs q4-6 hr prn cough, shortness of breath or wheezing, Disp: 18 g, Rfl: 0  •  amitriptyline (ELAVIL) 25 MG tablet, Take 25 mg by mouth Every Night., Disp: , Rfl:   •  atorvastatin (LIPITOR) 10 MG tablet, TAKE ONE TABLET BY MOUTH ONCE  NIGHTLY, Disp: 30 tablet, Rfl: 4  •  bisoprolol (ZEBeta) 10 MG tablet, TAKE ONE TABLET BY MOUTH DAILY, Disp: 30 tablet, Rfl: 5  •  lisinopril (PRINIVIL,ZESTRIL) 40 MG tablet, Take 1 tablet by mouth Daily., Disp: 30 tablet, Rfl: 5  •  potassium chloride ER (K-TAB) 20 MEQ tablet controlled-release ER tablet, Take 1 tablet by mouth Daily., Disp: 30 tablet, Rfl: 2  •  FLUoxetine (PROzac) 20 MG capsule, Take 1 capsule by mouth Daily., Disp: 30 capsule, Rfl: 1  •  spironolactone (Aldactone) 25 MG tablet, Take 1 tablet by mouth Daily., Disp: , Rfl:   •  vitamin D (ERGOCALCIFEROL) 1.25 MG (43167 UT) capsule capsule, Take 1 capsule by mouth 1 (One) Time Per Week., Disp: 4 capsule, Rfl: 2       Plan of care reviewed with the patient at the conclusion of today's visit.  Education was provided regarding diagnosis, management, and any prescribed or recommended OTC medications.  Patient verbalized understanding of and agreement with management plan.     Return in about 3 months (around 7/1/2021), or if symptoms worsen or fail to improve.      MARTIN Ramirez    Please note that portions of this note were completed with a voice recognition program. Efforts were made to edit the dictations, but occasionally words are mistranscribed.

## 2021-04-02 LAB
25(OH)D3 SERPL-MCNC: 28.8 NG/ML
ALBUMIN SERPL-MCNC: 4.4 G/DL (ref 3.5–5.2)
ALBUMIN/GLOB SERPL: 1.3 G/DL
ALP SERPL-CCNC: 143 U/L (ref 39–117)
ALT SERPL W P-5'-P-CCNC: 41 U/L (ref 1–33)
ANION GAP SERPL CALCULATED.3IONS-SCNC: 16.5 MMOL/L (ref 5–15)
AST SERPL-CCNC: 76 U/L (ref 1–32)
BILIRUB SERPL-MCNC: 0.3 MG/DL (ref 0–1.2)
BUN SERPL-MCNC: 13 MG/DL (ref 6–20)
BUN/CREAT SERPL: 13.1 (ref 7–25)
CALCIUM SPEC-SCNC: 10.3 MG/DL (ref 8.6–10.5)
CHLORIDE SERPL-SCNC: 100 MMOL/L (ref 98–107)
CHOLEST SERPL-MCNC: 313 MG/DL (ref 0–200)
CO2 SERPL-SCNC: 21.5 MMOL/L (ref 22–29)
CREAT SERPL-MCNC: 0.99 MG/DL (ref 0.57–1)
DEPRECATED RDW RBC AUTO: 48.2 FL (ref 37–54)
ERYTHROCYTE [DISTWIDTH] IN BLOOD BY AUTOMATED COUNT: 15.6 % (ref 12.3–15.4)
FOLATE SERPL-MCNC: 7.7 NG/ML (ref 4.78–24.2)
GFR SERPL CREATININE-BSD FRML MDRD: 71 ML/MIN/1.73
GLOBULIN UR ELPH-MCNC: 3.3 GM/DL
GLUCOSE SERPL-MCNC: 71 MG/DL (ref 65–99)
HBA1C MFR BLD: 4.9 % (ref 4.8–5.6)
HCT VFR BLD AUTO: 46.6 % (ref 34–46.6)
HDLC SERPL-MCNC: 88 MG/DL (ref 40–60)
HGB BLD-MCNC: 14.8 G/DL (ref 12–15.9)
LDLC SERPL CALC-MCNC: 196 MG/DL (ref 0–100)
LDLC/HDLC SERPL: 2.19 {RATIO}
MAGNESIUM SERPL-MCNC: 2.2 MG/DL (ref 1.6–2.6)
MCH RBC QN AUTO: 27.5 PG (ref 26.6–33)
MCHC RBC AUTO-ENTMCNC: 31.8 G/DL (ref 31.5–35.7)
MCV RBC AUTO: 86.6 FL (ref 79–97)
PLATELET # BLD AUTO: 387 10*3/MM3 (ref 140–450)
PMV BLD AUTO: 9.8 FL (ref 6–12)
POTASSIUM SERPL-SCNC: 4.6 MMOL/L (ref 3.5–5.2)
PROT SERPL-MCNC: 7.7 G/DL (ref 6–8.5)
RBC # BLD AUTO: 5.38 10*6/MM3 (ref 3.77–5.28)
SODIUM SERPL-SCNC: 138 MMOL/L (ref 136–145)
TRIGL SERPL-MCNC: 160 MG/DL (ref 0–150)
TSH SERPL DL<=0.05 MIU/L-ACNC: 0.66 UIU/ML (ref 0.27–4.2)
VIT B12 BLD-MCNC: 643 PG/ML (ref 211–946)
VLDLC SERPL-MCNC: 29 MG/DL (ref 5–40)
WBC # BLD AUTO: 6.03 10*3/MM3 (ref 3.4–10.8)

## 2021-04-09 RX ORDER — ATORVASTATIN CALCIUM 20 MG/1
20 TABLET, FILM COATED ORAL DAILY
Qty: 30 TABLET | Refills: 1 | Status: SHIPPED | OUTPATIENT
Start: 2021-04-09 | End: 2021-07-21 | Stop reason: SDUPTHER

## 2021-04-13 NOTE — PROGRESS NOTES
Vitamin D is low so make sure to take the once weekly supplement. Cholesterol is elevated so follow low cholesterol and fat diet. Try to be more physically active and take liptor daily. I want to repeat this in 3 months fasting. If no improvement then will have to increase lipitor. Liver enzymes are elevated so decrease any tylenol or alcohol intake. I will also repeat these in 3 months. All other labs are normal.

## 2021-04-22 ENCOUNTER — APPOINTMENT (OUTPATIENT)
Dept: MRI IMAGING | Facility: HOSPITAL | Age: 56
End: 2021-04-22

## 2021-05-05 ENCOUNTER — HOSPITAL ENCOUNTER (OUTPATIENT)
Dept: MRI IMAGING | Facility: HOSPITAL | Age: 56
Discharge: HOME | End: 2021-05-05
Admitting: NURSE PRACTITIONER

## 2021-05-05 DIAGNOSIS — H53.8 BLURRY VISION: ICD-10-CM

## 2021-05-05 DIAGNOSIS — R41.3 MEMORY LOSS: ICD-10-CM

## 2021-05-05 PROCEDURE — 70551 MRI BRAIN STEM W/O DYE: CPT

## 2021-05-12 DIAGNOSIS — R41.3 MEMORY LOSS: Primary | ICD-10-CM

## 2021-05-12 DIAGNOSIS — R90.89 ABNORMAL BRAIN MRI: ICD-10-CM

## 2021-05-12 RX ORDER — ASPIRIN 81 MG/1
81 TABLET ORAL DAILY
Qty: 30 TABLET | Refills: 5 | Status: SHIPPED | OUTPATIENT
Start: 2021-05-12 | End: 2022-02-16

## 2021-07-10 DIAGNOSIS — I10 ESSENTIAL HYPERTENSION: ICD-10-CM

## 2021-07-12 RX ORDER — LISINOPRIL 40 MG/1
TABLET ORAL
Qty: 30 TABLET | Refills: 4 | Status: SHIPPED | OUTPATIENT
Start: 2021-07-12 | End: 2022-02-02

## 2021-07-19 ENCOUNTER — LAB (OUTPATIENT)
Dept: LAB | Facility: HOSPITAL | Age: 56
End: 2021-07-19

## 2021-07-19 ENCOUNTER — OFFICE VISIT (OUTPATIENT)
Dept: INTERNAL MEDICINE | Facility: CLINIC | Age: 56
End: 2021-07-19

## 2021-07-19 VITALS
WEIGHT: 135 LBS | DIASTOLIC BLOOD PRESSURE: 64 MMHG | HEART RATE: 83 BPM | BODY MASS INDEX: 23.92 KG/M2 | OXYGEN SATURATION: 98 % | TEMPERATURE: 97.6 F | HEIGHT: 63 IN | SYSTOLIC BLOOD PRESSURE: 118 MMHG

## 2021-07-19 DIAGNOSIS — F41.8 DEPRESSION WITH ANXIETY: ICD-10-CM

## 2021-07-19 DIAGNOSIS — G47.09 OTHER INSOMNIA: ICD-10-CM

## 2021-07-19 DIAGNOSIS — M79.604 RIGHT LEG PAIN: ICD-10-CM

## 2021-07-19 DIAGNOSIS — E78.00 PURE HYPERCHOLESTEROLEMIA: ICD-10-CM

## 2021-07-19 DIAGNOSIS — I10 ESSENTIAL HYPERTENSION: Primary | ICD-10-CM

## 2021-07-19 DIAGNOSIS — E55.9 VITAMIN D DEFICIENCY: ICD-10-CM

## 2021-07-19 LAB
BASOPHILS # BLD AUTO: 0.04 10*3/MM3 (ref 0–0.2)
BASOPHILS NFR BLD AUTO: 0.6 % (ref 0–1.5)
DEPRECATED RDW RBC AUTO: 49.9 FL (ref 37–54)
EOSINOPHIL # BLD AUTO: 0.03 10*3/MM3 (ref 0–0.4)
EOSINOPHIL NFR BLD AUTO: 0.5 % (ref 0.3–6.2)
ERYTHROCYTE [DISTWIDTH] IN BLOOD BY AUTOMATED COUNT: 16.1 % (ref 12.3–15.4)
HCT VFR BLD AUTO: 35.4 % (ref 34–46.6)
HGB BLD-MCNC: 11.5 G/DL (ref 12–15.9)
IMM GRANULOCYTES # BLD AUTO: 0.01 10*3/MM3 (ref 0–0.05)
IMM GRANULOCYTES NFR BLD AUTO: 0.2 % (ref 0–0.5)
LYMPHOCYTES # BLD AUTO: 1.94 10*3/MM3 (ref 0.7–3.1)
LYMPHOCYTES NFR BLD AUTO: 31.5 % (ref 19.6–45.3)
MCH RBC QN AUTO: 28 PG (ref 26.6–33)
MCHC RBC AUTO-ENTMCNC: 32.5 G/DL (ref 31.5–35.7)
MCV RBC AUTO: 86.3 FL (ref 79–97)
MONOCYTES # BLD AUTO: 0.5 10*3/MM3 (ref 0.1–0.9)
MONOCYTES NFR BLD AUTO: 8.1 % (ref 5–12)
NEUTROPHILS NFR BLD AUTO: 3.64 10*3/MM3 (ref 1.7–7)
NEUTROPHILS NFR BLD AUTO: 59.1 % (ref 42.7–76)
NRBC BLD AUTO-RTO: 0 /100 WBC (ref 0–0.2)
PLATELET # BLD AUTO: 347 10*3/MM3 (ref 140–450)
PMV BLD AUTO: 10 FL (ref 6–12)
RBC # BLD AUTO: 4.1 10*6/MM3 (ref 3.77–5.28)
WBC # BLD AUTO: 6.16 10*3/MM3 (ref 3.4–10.8)

## 2021-07-19 PROCEDURE — 85025 COMPLETE CBC W/AUTO DIFF WBC: CPT | Performed by: NURSE PRACTITIONER

## 2021-07-19 PROCEDURE — 82306 VITAMIN D 25 HYDROXY: CPT | Performed by: NURSE PRACTITIONER

## 2021-07-19 PROCEDURE — 80061 LIPID PANEL: CPT | Performed by: NURSE PRACTITIONER

## 2021-07-19 PROCEDURE — 99214 OFFICE O/P EST MOD 30 MIN: CPT | Performed by: NURSE PRACTITIONER

## 2021-07-19 PROCEDURE — 80053 COMPREHEN METABOLIC PANEL: CPT | Performed by: NURSE PRACTITIONER

## 2021-07-19 RX ORDER — PRAZOSIN HYDROCHLORIDE 1 MG/1
1 CAPSULE ORAL NIGHTLY
Qty: 30 CAPSULE | Refills: 2 | Status: SHIPPED | OUTPATIENT
Start: 2021-07-19 | End: 2022-02-16

## 2021-07-19 RX ORDER — METHOCARBAMOL 750 MG/1
TABLET, FILM COATED ORAL
Qty: 60 TABLET | Refills: 2 | Status: SHIPPED | OUTPATIENT
Start: 2021-07-19 | End: 2022-10-27 | Stop reason: SDUPTHER

## 2021-07-19 RX ORDER — TRAZODONE HYDROCHLORIDE 50 MG/1
TABLET ORAL
Qty: 45 TABLET | Refills: 2 | Status: SHIPPED | OUTPATIENT
Start: 2021-07-19 | End: 2022-08-24

## 2021-07-19 RX ORDER — FLUOXETINE HYDROCHLORIDE 40 MG/1
40 CAPSULE ORAL DAILY
Qty: 30 CAPSULE | Refills: 2 | Status: SHIPPED | OUTPATIENT
Start: 2021-07-19 | End: 2021-11-04

## 2021-07-20 LAB
25(OH)D3 SERPL-MCNC: 38.9 NG/ML
ALBUMIN SERPL-MCNC: 4.2 G/DL (ref 3.5–5.2)
ALBUMIN/GLOB SERPL: 1.8 G/DL
ALP SERPL-CCNC: 112 U/L (ref 39–117)
ALT SERPL W P-5'-P-CCNC: 31 U/L (ref 1–33)
ANION GAP SERPL CALCULATED.3IONS-SCNC: 11.9 MMOL/L (ref 5–15)
AST SERPL-CCNC: 49 U/L (ref 1–32)
BILIRUB SERPL-MCNC: 0.3 MG/DL (ref 0–1.2)
BUN SERPL-MCNC: 11 MG/DL (ref 6–20)
BUN/CREAT SERPL: 12.8 (ref 7–25)
CALCIUM SPEC-SCNC: 9.4 MG/DL (ref 8.6–10.5)
CHLORIDE SERPL-SCNC: 107 MMOL/L (ref 98–107)
CHOLEST SERPL-MCNC: 239 MG/DL (ref 0–200)
CO2 SERPL-SCNC: 23.1 MMOL/L (ref 22–29)
CREAT SERPL-MCNC: 0.86 MG/DL (ref 0.57–1)
GFR SERPL CREATININE-BSD FRML MDRD: 84 ML/MIN/1.73
GLOBULIN UR ELPH-MCNC: 2.3 GM/DL
GLUCOSE SERPL-MCNC: 73 MG/DL (ref 65–99)
HDLC SERPL-MCNC: 96 MG/DL (ref 40–60)
LDLC SERPL CALC-MCNC: 126 MG/DL (ref 0–100)
LDLC/HDLC SERPL: 1.28 {RATIO}
POTASSIUM SERPL-SCNC: 4.1 MMOL/L (ref 3.5–5.2)
PROT SERPL-MCNC: 6.5 G/DL (ref 6–8.5)
SODIUM SERPL-SCNC: 142 MMOL/L (ref 136–145)
TRIGL SERPL-MCNC: 100 MG/DL (ref 0–150)
VLDLC SERPL-MCNC: 17 MG/DL (ref 5–40)

## 2021-07-21 ENCOUNTER — OFFICE VISIT (OUTPATIENT)
Dept: NEUROLOGY | Facility: CLINIC | Age: 56
End: 2021-07-21

## 2021-07-21 ENCOUNTER — HOSPITAL ENCOUNTER (OUTPATIENT)
Dept: GENERAL RADIOLOGY | Facility: HOSPITAL | Age: 56
Discharge: HOME | End: 2021-07-21
Admitting: NURSE PRACTITIONER

## 2021-07-21 VITALS
WEIGHT: 134 LBS | HEART RATE: 67 BPM | DIASTOLIC BLOOD PRESSURE: 68 MMHG | HEIGHT: 63 IN | SYSTOLIC BLOOD PRESSURE: 112 MMHG | BODY MASS INDEX: 23.74 KG/M2 | OXYGEN SATURATION: 97 %

## 2021-07-21 DIAGNOSIS — F10.20 ALCOHOL DEPENDENCE, DAILY USE (HCC): ICD-10-CM

## 2021-07-21 DIAGNOSIS — M79.604 RIGHT LEG PAIN: Primary | ICD-10-CM

## 2021-07-21 DIAGNOSIS — M25.551 RIGHT HIP PAIN: ICD-10-CM

## 2021-07-21 DIAGNOSIS — R41.3 MEMORY LOSS: Primary | ICD-10-CM

## 2021-07-21 DIAGNOSIS — R26.2 DIFFICULTY WALKING: ICD-10-CM

## 2021-07-21 DIAGNOSIS — R93.89 ABNORMAL X-RAY: ICD-10-CM

## 2021-07-21 DIAGNOSIS — F41.8 DEPRESSION WITH ANXIETY: ICD-10-CM

## 2021-07-21 DIAGNOSIS — M79.604 RIGHT LEG PAIN: ICD-10-CM

## 2021-07-21 DIAGNOSIS — I67.89 CEREBRAL MICROVASCULAR DISEASE: ICD-10-CM

## 2021-07-21 PROCEDURE — 73552 X-RAY EXAM OF FEMUR 2/>: CPT

## 2021-07-21 PROCEDURE — 99215 OFFICE O/P EST HI 40 MIN: CPT | Performed by: NURSE PRACTITIONER

## 2021-07-21 PROCEDURE — 72170 X-RAY EXAM OF PELVIS: CPT

## 2021-07-21 RX ORDER — FERROUS SULFATE 325(65) MG
325 TABLET ORAL
Qty: 30 TABLET | Refills: 2 | Status: SHIPPED | OUTPATIENT
Start: 2021-07-21 | End: 2022-03-28

## 2021-07-21 RX ORDER — FOLIC ACID 1 MG/1
1 TABLET ORAL DAILY
Qty: 90 TABLET | Refills: 3 | Status: SHIPPED | OUTPATIENT
Start: 2021-07-21 | End: 2022-10-27 | Stop reason: SDUPTHER

## 2021-07-21 RX ORDER — METHION/INOS/CHOL BT/B COM/LIV 110MG-86MG
100 CAPSULE ORAL DAILY
Qty: 90 TABLET | Refills: 3 | Status: SHIPPED | OUTPATIENT
Start: 2021-07-21 | End: 2022-02-16

## 2021-07-21 RX ORDER — ATORVASTATIN CALCIUM 40 MG/1
40 TABLET, FILM COATED ORAL NIGHTLY
Qty: 90 TABLET | Refills: 1 | Status: SHIPPED | OUTPATIENT
Start: 2021-07-21 | End: 2022-02-16 | Stop reason: SDUPTHER

## 2021-07-21 NOTE — PROGRESS NOTES
Subjective:     Patient ID: Jamaica Shell is a 55 y.o. female.    CC:   Chief Complaint   Patient presents with   • Memory Loss       HPI:   History of Present Illness     Jamaica Shell is a 55 y.o. female who comes to clinic today for evaluation of memory loss . She has noted symptoms since at least 2011 marked initially by forgetfulness , repetitiveness  and word-finding difficulties . This has worsened  over time. Additional symptoms have included impairments in short term memory . There have been associated  symptoms of anxiety , depression  and nightmares. She denies  impairments in ADL's. Her family  manages her medications  and finances. She continues to drive.  . She is currently residing at home with her . 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     She is accompanied by her friend today since her  is working.  She admits today that since 2011 when her son unfortunately passed away she has suffered from significant anxiety and depression.  She has never seen a counselor for this.  She is currently being treated with her primary care and is on fluoxetine 40 mg daily which was recently increased as well as trazodone 50 mg at night and prazosin 1 mg at night.  She tells me that she does not sleep well, she has nightmares and awakens often and has trouble going back to sleep.  She also admits that she has been drinking a pint a day of mignon and/or bourbon since 2011 to deal with the death of her son.  She does continue to work during the day and drive without difficulty.  She only drinks in the evening after work. Completed highschool education.  She saw her primary care provider just 2 days ago and had updated fasting lipids with total cholesterol 239, HDL 96,  which is improved from April 2021.  Vitamin D improved to 38.9.  Magnesium 2.2.  April 2021 labs TSH normal.  B12 643 and folate 7.  Hemoglobin A1c 4.9%.  She has had uncontrolled  hypertension and has been evaluated by cardiology.  Her hypertension is better controlled now.  She reports difficulty with short-term memory and essentially amnesia type symptoms.  Her  puts together her medications.  She denies any hallucinations or delusions.  She has not had any falls.  She has trouble with memory and focus and concentration.  She is currently on aspirin 81 mg daily.  She is on Lipitor 20 mg daily.  She is not taking any supplements with alcohol intake and tells me today none of her healthcare providers are aware of the amount of alcohol she consumes on a daily basis.  She did undergo MRI of the brain without contrast completed on 5/5/2021 and imaging as well as radiology report today reviewed in clinic at show several chronic small vessel ischemic changes in the periventricular and subcortical white matter, no acute intracranial abnormalities. My interpretation. This was advanced for age but consistent with her hypertension, hyperlipidemia and alcohol intake.  This does not have the appearance of demyelinating lesions.  She denies any history of seizures, syncope, head trauma or other neurological disorders.  Denies migraines.    She has also noted some increased bruising on her legs.  CBC with differential was normal except for hemoglobin 11.5 on 7/19/2021 with PCP.  She is also having some right hip pain without trauma and is scheduled to get right hip x-rays today ordered by her PCP.    The following portions of the patient's history were reviewed and updated as appropriate: allergies, current medications, past family history, past medical history, past social history, past surgical history and problem list.    Past Medical History:   Diagnosis Date   • Anxiety    • Anxiety    • Asthma    • Depression    • Depression    • Hyperlipidemia    • Hypertension        Past Surgical History:   Procedure Laterality Date   • TUBAL ABDOMINAL LIGATION         Social History     Socioeconomic  History   • Marital status: Single     Spouse name: Not on file   • Number of children: Not on file   • Years of education: Not on file   • Highest education level: Not on file   Tobacco Use   • Smoking status: Current Every Day Smoker     Packs/day: 0.50     Types: Cigarettes   • Smokeless tobacco: Never Used   Vaping Use   • Vaping Use: Never used   Substance and Sexual Activity   • Alcohol use: Yes     Comment: reports on 7/21/2021- 1 pint of bourbon/mignon per day since 2011   • Drug use: No   • Sexual activity: Defer       Family History   Problem Relation Age of Onset   • Diabetes Mother    • No Known Problems Father         Review of Systems   Constitutional: Negative for chills, fatigue, fever and unexpected weight change.   HENT: Negative for ear pain, hearing loss, nosebleeds, rhinorrhea and sore throat.    Eyes: Negative for photophobia, pain, discharge, itching and visual disturbance.   Respiratory: Negative for cough, chest tightness, shortness of breath and wheezing.    Cardiovascular: Negative for chest pain, palpitations and leg swelling.   Gastrointestinal: Negative for abdominal pain, blood in stool, constipation, diarrhea, nausea and vomiting.   Genitourinary: Negative for dysuria, frequency, hematuria and urgency.   Musculoskeletal: Negative for arthralgias, back pain, gait problem, joint swelling, myalgias, neck pain and neck stiffness.   Skin: Negative for rash and wound.   Allergic/Immunologic: Negative for environmental allergies and food allergies.   Neurological: Negative for dizziness, tremors, seizures, syncope, speech difficulty, weakness, light-headedness, numbness and headaches.   Hematological: Negative for adenopathy. Does not bruise/bleed easily.        Lack  Of sleep   Psychiatric/Behavioral: Positive for agitation, confusion, decreased concentration, dysphoric mood and sleep disturbance. Negative for hallucinations and suicidal ideas. The patient is nervous/anxious.    All other  "systems reviewed and are negative.       Objective:  /68   Pulse 67   Ht 160 cm (63\")   Wt 60.8 kg (134 lb)   SpO2 97%   BMI 23.74 kg/m²     Neurologic Exam     Mental Status   Oriented to person, place, and time.   Registration: recalls 3 of 3 objects. Recall at 5 minutes: recalls 3 of 3 objects. Follows 3 step commands.   Attention: normal. Concentration: normal.   Speech: speech is normal   Level of consciousness: alert  Knowledge: good and consistent with education. Able to perform simple calculations.   Able to name object. Able to read. Able to repeat. Unable to write. Normal comprehension.     Cranial Nerves   Cranial nerves II through XII intact.     Motor Exam   Muscle bulk: normal  Overall muscle tone: normal    Strength   Strength 5/5 throughout.     Sensory Exam   Light touch normal.   Vibration normal.   Proprioception normal.   Pinprick normal.     Gait, Coordination, and Reflexes     Gait  Gait: (antalgic d/t right hip pain and getting xray today, denies trauma)    Coordination   Romberg: negative  Finger to nose coordination: normal  Heel to shin coordination: normal    Tremor   Resting tremor: absent  Intention tremor: absent  Action tremor: absent    Reflexes   Right brachioradialis: 2+  Left brachioradialis: 2+  Right biceps: 2+  Left biceps: 2+  Right patellar: 2+  Left patellar: 2+  Right achilles: 2+  Left achilles: 2+  Right : 2+  Left : 2+  Right plantar: normal  Left plantar: normal  Right ankle clonus: absent  Left ankle clonus: absent      Physical Exam  Constitutional:       Appearance: Normal appearance.   Cardiovascular:      Rate and Rhythm: Normal rate and regular rhythm.      Heart sounds: Normal heart sounds, S1 normal and S2 normal.   Pulmonary:      Effort: Pulmonary effort is normal.      Breath sounds: Normal breath sounds.   Neurological:      Mental Status: She is alert and oriented to person, place, and time.      Coordination: Finger-Nose-Finger Test, Heel " to Chavez Test and Romberg Test normal.      Deep Tendon Reflexes: Strength normal.      Reflex Scores:       Bicep reflexes are 2+ on the right side and 2+ on the left side.       Brachioradialis reflexes are 2+ on the right side and 2+ on the left side.       Patellar reflexes are 2+ on the right side and 2+ on the left side.       Achilles reflexes are 2+ on the right side and 2+ on the left side.  Psychiatric:         Mood and Affect: Mood is anxious and depressed.         Speech: Speech normal.         Behavior: Behavior normal.         Thought Content: Thought content normal.         Cognition and Memory: She exhibits impaired recent memory.         Judgment: Judgment normal.       MMSE 29/30    Assessment/Plan:       Diagnoses and all orders for this visit:    1. Memory loss (Primary)  -     Hepatitis Panel, Acute; Future  -     RPR; Future  -     Methylmalonic Acid, Serum; Future  -     Ammonia; Future  -     HIV-1 / O / 2 Ag / Antibody 4th Generation; Future  -     Vitamin B6; Future  -     Vitamin B1, Whole Blood; Future  -     EEG Awake or Drowsy Routine; Future    2. Depression with anxiety  -     Ambulatory Referral to Behavioral Health    3. Cerebral microvascular disease  -     atorvastatin (Lipitor) 40 MG tablet; Take 1 tablet by mouth Every Night.  Dispense: 90 tablet; Refill: 1    4. Alcohol dependence, daily use (CMS/HCC)  -     thiamine (thiamine) 100 MG tablet tablet; Take 1 tablet by mouth Daily.  Dispense: 90 tablet; Refill: 3  -     folic acid (FOLVITE) 1 MG tablet; Take 1 tablet by mouth Daily.  Dispense: 90 tablet; Refill: 3           Total time of visit today 45 minutes including review of primary care history, cardiology history, review of history with the patient and her friend today in clinic, gathering additional information, exam, discussion of plan of care and work-up moving forward and documentation of today's visit.  Also recommended she slowly reduce her alcohol consumption daily  which should also improve her cognitive abilities.  We will complete additional labs today for memory loss under 65 years of age. Reassuring that this is likely not a neurodegenerative disorder such as Alzheimer's disease or dementia and most likely related to anxiety, depression and alcohol  intake but will continue to monitor closely. We will complete EEG.  Referral to behavioral health for psychiatry and counseling to address her depression, anxiety, the death of her son, grieving as well as possible PTSD with nightmares.  Also with her elevated cholesterol and cerebral microvascular changes I recommend increasing her Lipitor to 40 mg at night.  We will also add thiamine and folic acid with her alcohol dependence.  Reviewed MRI of the brain and with her history of uncontrolled hypertension, alcohol intake, smoking and high cholesterol I feel these are consistent with chronic microvascular changes and not concerning for multiple sclerosis.  We are going to follow-up in 3 months for reevaluation of symptoms or sooner if needed.    Reviewed medications, potential side effects and signs and symptoms to report. Discussed risk versus benefits of treatment plan with patient and/or family-including medications, labs and radiology that may be ordered. Addressed questions and concerns during visit. Patient and/or family verbalized understanding and agree with plan.    AS THE PROVIDER, I PERSONALLY WORE PPE DURING ENTIRE FACE TO FACE ENCOUNTER IN CLINIC WITH THE PATIENT. PATIENT ALSO WORE PPE DURING ENTIRE FACE TO FACE ENCOUNTER EXCEPT FOR A MAX OF 30 SECONDS DURING NEUROLOGICAL EVALUATION OF CRANIAL NERVES AND THEN MASK WAS PLACED BACK OVER PATIENT FACE FOR REMAINDER OF VISIT. I WASHED MY HANDS BEFORE AND AFTER VISIT.    During this visit the following were done:  Labs Reviewed [x]    Labs Ordered [x]    Radiology Reports Reviewed [x]    Radiology Ordered [x]    PCP Records Reviewed [x]    Referring Provider Records  Reviewed [x]    ER Records Reviewed []    Hospital Records Reviewed []    History Obtained From Family [x]  Friend during visit  Radiology Images Reviewed [x]    Other Reviewed [x]  Cardiology notes  Records Requested []      Sheba Saldana, APRN  7/21/2021

## 2021-07-21 NOTE — PROGRESS NOTES
You have moderate amount of arthritis in your right hip and mild amount in left hip. Due to the severity of your symptoms I am referring you to orthopedics for a consult. You will get called with an appt.

## 2021-07-26 ENCOUNTER — TELEPHONE (OUTPATIENT)
Dept: INTERNAL MEDICINE | Facility: CLINIC | Age: 56
End: 2021-07-26

## 2021-07-26 DIAGNOSIS — M79.604 RIGHT LEG PAIN: ICD-10-CM

## 2021-07-26 DIAGNOSIS — Z12.11 SCREENING FOR COLON CANCER: ICD-10-CM

## 2021-07-26 DIAGNOSIS — M25.551 RIGHT HIP PAIN: ICD-10-CM

## 2021-07-26 DIAGNOSIS — Z12.31 ENCOUNTER FOR SCREENING MAMMOGRAM FOR MALIGNANT NEOPLASM OF BREAST: Primary | ICD-10-CM

## 2021-07-26 NOTE — TELEPHONE ENCOUNTER
Caller: Jamaica Shell    Relationship: Self    Best call back number: [phone]   What medication are you requesting: SOMETHING FOR PAIN    What are your current symptoms: RIGHT LEG PAIN AND RIGHT SHOULDER PAIN     How long have you been experiencing symptoms: RIGHT LEG PAIN SINCE 07/21/2021 AND RIGHT SHOULDER PAIN AS OF 07/23/2021    Have you had these symptoms before:    [x] Yes  [] No    Have you been treated for these symptoms before:   [x] Yes  [] No    If a prescription is needed, what is your preferred pharmacy and phone number: [facility] [address] - [phone]  - [phone]      Additional notes:  THE PATIENT REPORTS THE MUSCLE RELAXERS ARE NOT HELPING WITH THE PAIN AND THE PATIENT IS REQUESTING SOMETHING THAT WILL HELP THE PAIN GO AWAY.  PLEASE CALL THE PATIENT IF ANY QUESTIONS AND TO CONFIRM -[phone].

## 2021-07-27 RX ORDER — TRAMADOL HYDROCHLORIDE 50 MG/1
50 TABLET ORAL EVERY 6 HOURS PRN
Qty: 28 TABLET | Refills: 0 | Status: SHIPPED | OUTPATIENT
Start: 2021-07-27 | End: 2022-02-16

## 2021-07-27 NOTE — TELEPHONE ENCOUNTER
I sent tramadol. Let her know that she needs to get some of her preventative stuff done. I have ordered a mammogram and cologuard for her. Let her know it's very important to get these done.

## 2021-07-28 DIAGNOSIS — F10.20 ALCOHOL DEPENDENCE, DAILY USE (HCC): ICD-10-CM

## 2021-07-28 DIAGNOSIS — F41.8 DEPRESSION WITH ANXIETY: Primary | ICD-10-CM

## 2021-08-08 PROBLEM — G47.09 OTHER INSOMNIA: Status: ACTIVE | Noted: 2021-08-08

## 2021-08-20 ENCOUNTER — OFFICE VISIT (OUTPATIENT)
Dept: INTERNAL MEDICINE | Facility: CLINIC | Age: 56
End: 2021-08-20

## 2021-08-20 VITALS
OXYGEN SATURATION: 98 % | DIASTOLIC BLOOD PRESSURE: 70 MMHG | WEIGHT: 136 LBS | HEIGHT: 63 IN | HEART RATE: 80 BPM | RESPIRATION RATE: 16 BRPM | BODY MASS INDEX: 24.1 KG/M2 | SYSTOLIC BLOOD PRESSURE: 122 MMHG | TEMPERATURE: 96.9 F

## 2021-08-20 DIAGNOSIS — M25.551 RIGHT HIP PAIN: ICD-10-CM

## 2021-08-20 DIAGNOSIS — I10 ESSENTIAL HYPERTENSION: Primary | ICD-10-CM

## 2021-08-20 DIAGNOSIS — F41.8 DEPRESSION WITH ANXIETY: ICD-10-CM

## 2021-08-20 DIAGNOSIS — M79.604 RIGHT LEG PAIN: ICD-10-CM

## 2021-08-20 PROCEDURE — 99214 OFFICE O/P EST MOD 30 MIN: CPT | Performed by: NURSE PRACTITIONER

## 2021-11-04 DIAGNOSIS — F41.8 DEPRESSION WITH ANXIETY: ICD-10-CM

## 2021-11-04 RX ORDER — FLUOXETINE HYDROCHLORIDE 40 MG/1
CAPSULE ORAL
Qty: 30 CAPSULE | Refills: 2 | Status: SHIPPED | OUTPATIENT
Start: 2021-11-04 | End: 2022-03-12

## 2022-02-02 DIAGNOSIS — I10 ESSENTIAL HYPERTENSION: ICD-10-CM

## 2022-02-02 RX ORDER — LISINOPRIL 40 MG/1
TABLET ORAL
Qty: 30 TABLET | Refills: 4 | Status: SHIPPED | OUTPATIENT
Start: 2022-02-02 | End: 2022-08-02

## 2022-02-16 ENCOUNTER — OFFICE VISIT (OUTPATIENT)
Dept: INTERNAL MEDICINE | Facility: CLINIC | Age: 57
End: 2022-02-16

## 2022-02-16 ENCOUNTER — LAB (OUTPATIENT)
Dept: LAB | Facility: HOSPITAL | Age: 57
End: 2022-02-16

## 2022-02-16 VITALS
TEMPERATURE: 98.7 F | HEART RATE: 78 BPM | BODY MASS INDEX: 24.27 KG/M2 | HEIGHT: 63 IN | DIASTOLIC BLOOD PRESSURE: 96 MMHG | SYSTOLIC BLOOD PRESSURE: 168 MMHG | WEIGHT: 137 LBS | OXYGEN SATURATION: 99 % | RESPIRATION RATE: 16 BRPM

## 2022-02-16 DIAGNOSIS — I10 ESSENTIAL HYPERTENSION: Primary | ICD-10-CM

## 2022-02-16 DIAGNOSIS — E78.00 PURE HYPERCHOLESTEROLEMIA: ICD-10-CM

## 2022-02-16 DIAGNOSIS — D50.8 IRON DEFICIENCY ANEMIA SECONDARY TO INADEQUATE DIETARY IRON INTAKE: ICD-10-CM

## 2022-02-16 DIAGNOSIS — E55.9 VITAMIN D DEFICIENCY: ICD-10-CM

## 2022-02-16 DIAGNOSIS — D69.9 BLEEDS EASILY: ICD-10-CM

## 2022-02-16 DIAGNOSIS — R79.89 LOW TSH LEVEL: ICD-10-CM

## 2022-02-16 DIAGNOSIS — E53.9 VITAMIN B DEFICIENCY: ICD-10-CM

## 2022-02-16 DIAGNOSIS — R63.0 DECREASED APPETITE: ICD-10-CM

## 2022-02-16 DIAGNOSIS — F41.8 DEPRESSION WITH ANXIETY: ICD-10-CM

## 2022-02-16 LAB
25(OH)D3 SERPL-MCNC: 30.8 NG/ML
ALBUMIN SERPL-MCNC: 4 G/DL (ref 3.5–5.2)
ALBUMIN/GLOB SERPL: 1.3 G/DL
ALP SERPL-CCNC: 126 U/L (ref 39–117)
ALT SERPL W P-5'-P-CCNC: 24 U/L (ref 1–33)
ANION GAP SERPL CALCULATED.3IONS-SCNC: 12 MMOL/L (ref 5–15)
AST SERPL-CCNC: 35 U/L (ref 1–32)
BASOPHILS # BLD AUTO: 0.02 10*3/MM3 (ref 0–0.2)
BASOPHILS NFR BLD AUTO: 0.3 % (ref 0–1.5)
BILIRUB SERPL-MCNC: 0.4 MG/DL (ref 0–1.2)
BUN SERPL-MCNC: 12 MG/DL (ref 6–20)
BUN/CREAT SERPL: 13.3 (ref 7–25)
CALCIUM SPEC-SCNC: 9.6 MG/DL (ref 8.6–10.5)
CHLORIDE SERPL-SCNC: 106 MMOL/L (ref 98–107)
CHOLEST SERPL-MCNC: 309 MG/DL (ref 0–200)
CO2 SERPL-SCNC: 26 MMOL/L (ref 22–29)
CREAT SERPL-MCNC: 0.9 MG/DL (ref 0.57–1)
DEPRECATED RDW RBC AUTO: 40 FL (ref 37–54)
EOSINOPHIL # BLD AUTO: 0.02 10*3/MM3 (ref 0–0.4)
EOSINOPHIL NFR BLD AUTO: 0.3 % (ref 0.3–6.2)
ERYTHROCYTE [DISTWIDTH] IN BLOOD BY AUTOMATED COUNT: 13.2 % (ref 12.3–15.4)
FERRITIN SERPL-MCNC: 196 NG/ML (ref 13–150)
FOLATE SERPL-MCNC: >20 NG/ML (ref 4.78–24.2)
GFR SERPL CREATININE-BSD FRML MDRD: 79 ML/MIN/1.73
GLOBULIN UR ELPH-MCNC: 3.1 GM/DL
GLUCOSE SERPL-MCNC: 86 MG/DL (ref 65–99)
HCT VFR BLD AUTO: 36 % (ref 34–46.6)
HDLC SERPL-MCNC: 112 MG/DL (ref 40–60)
HGB BLD-MCNC: 11.9 G/DL (ref 12–15.9)
IMM GRANULOCYTES # BLD AUTO: 0.03 10*3/MM3 (ref 0–0.05)
IMM GRANULOCYTES NFR BLD AUTO: 0.5 % (ref 0–0.5)
INR PPP: 0.85 (ref 0.85–1.16)
IRON 24H UR-MRATE: 118 MCG/DL (ref 37–145)
IRON SATN MFR SERPL: 32 % (ref 20–50)
LDLC SERPL CALC-MCNC: 165 MG/DL (ref 0–100)
LDLC/HDLC SERPL: 1.44 {RATIO}
LYMPHOCYTES # BLD AUTO: 1.98 10*3/MM3 (ref 0.7–3.1)
LYMPHOCYTES NFR BLD AUTO: 31.4 % (ref 19.6–45.3)
MCH RBC QN AUTO: 27.9 PG (ref 26.6–33)
MCHC RBC AUTO-ENTMCNC: 33.1 G/DL (ref 31.5–35.7)
MCV RBC AUTO: 84.5 FL (ref 79–97)
MONOCYTES # BLD AUTO: 0.52 10*3/MM3 (ref 0.1–0.9)
MONOCYTES NFR BLD AUTO: 8.2 % (ref 5–12)
NEUTROPHILS NFR BLD AUTO: 3.74 10*3/MM3 (ref 1.7–7)
NEUTROPHILS NFR BLD AUTO: 59.3 % (ref 42.7–76)
NRBC BLD AUTO-RTO: 0.5 /100 WBC (ref 0–0.2)
PLATELET # BLD AUTO: 301 10*3/MM3 (ref 140–450)
PMV BLD AUTO: 10.2 FL (ref 6–12)
POTASSIUM SERPL-SCNC: 3.4 MMOL/L (ref 3.5–5.2)
PROT SERPL-MCNC: 7.1 G/DL (ref 6–8.5)
PROTHROMBIN TIME: 11.4 SECONDS (ref 11.4–14.4)
RBC # BLD AUTO: 4.26 10*6/MM3 (ref 3.77–5.28)
SODIUM SERPL-SCNC: 144 MMOL/L (ref 136–145)
T4 FREE SERPL-MCNC: 1.14 NG/DL (ref 0.93–1.7)
TIBC SERPL-MCNC: 364 MCG/DL (ref 298–536)
TRANSFERRIN SERPL-MCNC: 244 MG/DL (ref 200–360)
TRIGL SERPL-MCNC: 181 MG/DL (ref 0–150)
TSH SERPL DL<=0.05 MIU/L-ACNC: 0.68 UIU/ML (ref 0.27–4.2)
VIT B12 BLD-MCNC: 458 PG/ML (ref 211–946)
VLDLC SERPL-MCNC: 32 MG/DL (ref 5–40)
WBC NRBC COR # BLD: 6.31 10*3/MM3 (ref 3.4–10.8)

## 2022-02-16 PROCEDURE — 86376 MICROSOMAL ANTIBODY EACH: CPT | Performed by: NURSE PRACTITIONER

## 2022-02-16 PROCEDURE — 83540 ASSAY OF IRON: CPT | Performed by: NURSE PRACTITIONER

## 2022-02-16 PROCEDURE — 82746 ASSAY OF FOLIC ACID SERUM: CPT | Performed by: NURSE PRACTITIONER

## 2022-02-16 PROCEDURE — 82607 VITAMIN B-12: CPT | Performed by: NURSE PRACTITIONER

## 2022-02-16 PROCEDURE — 80061 LIPID PANEL: CPT | Performed by: NURSE PRACTITIONER

## 2022-02-16 PROCEDURE — 80050 GENERAL HEALTH PANEL: CPT | Performed by: NURSE PRACTITIONER

## 2022-02-16 PROCEDURE — 86800 THYROGLOBULIN ANTIBODY: CPT | Performed by: NURSE PRACTITIONER

## 2022-02-16 PROCEDURE — 85610 PROTHROMBIN TIME: CPT | Performed by: NURSE PRACTITIONER

## 2022-02-16 PROCEDURE — 84439 ASSAY OF FREE THYROXINE: CPT | Performed by: NURSE PRACTITIONER

## 2022-02-16 PROCEDURE — 82306 VITAMIN D 25 HYDROXY: CPT | Performed by: NURSE PRACTITIONER

## 2022-02-16 PROCEDURE — 84466 ASSAY OF TRANSFERRIN: CPT | Performed by: NURSE PRACTITIONER

## 2022-02-16 PROCEDURE — 82728 ASSAY OF FERRITIN: CPT | Performed by: NURSE PRACTITIONER

## 2022-02-16 PROCEDURE — 99214 OFFICE O/P EST MOD 30 MIN: CPT | Performed by: NURSE PRACTITIONER

## 2022-02-16 PROCEDURE — 84481 FREE ASSAY (FT-3): CPT | Performed by: NURSE PRACTITIONER

## 2022-02-16 RX ORDER — BISOPROLOL FUMARATE 10 MG/1
10 TABLET, FILM COATED ORAL DAILY
Qty: 90 TABLET | Refills: 3 | Status: SHIPPED | OUTPATIENT
Start: 2022-02-16

## 2022-02-16 RX ORDER — BUPROPION HYDROCHLORIDE 150 MG/1
150 TABLET ORAL DAILY
Qty: 30 TABLET | Refills: 2 | Status: SHIPPED | OUTPATIENT
Start: 2022-02-16 | End: 2022-04-28 | Stop reason: SDUPTHER

## 2022-02-16 RX ORDER — ATORVASTATIN CALCIUM 40 MG/1
40 TABLET, FILM COATED ORAL NIGHTLY
Qty: 90 TABLET | Refills: 3 | Status: SHIPPED | OUTPATIENT
Start: 2022-02-16 | End: 2022-11-28

## 2022-02-16 RX ORDER — SPIRONOLACTONE 25 MG/1
25 TABLET ORAL DAILY
Qty: 90 TABLET | Refills: 3
Start: 2022-02-16

## 2022-02-16 RX ORDER — PEDI MV NO.227/FERROUS SULFATE 10 MG
1 TABLET,CHEWABLE ORAL DAILY
COMMUNITY

## 2022-02-16 NOTE — PROGRESS NOTES
Subjective   Chief Complaint   Patient presents with   • Hypertension   • Hyperlipidemia      Jamaica Shell is a 56 y.o. female here today for hypertension, hyperlipidemia, depression, anxiety, and anemia. Blood pressure is elevated. Patient reports taking all meds as prescribed but is not taking bisoprolol, spironolactone, and lipitor. She wasn't aware she needed to be taking these. Having some leg swelling. Denies headaches, blurry vision, shortness of breath, or chest pain. She has decreased appetite and not eating well. Reports easy bruising and bleeding. Last set of labs showed anemia, vitamin D deficiency, and vitamin B12 deficiency. She is taking flintstone vitamins with iron and folate supplement only. Has increased depression and anxiety. Has tried several SSRIs in the past that did not help.       I have reviewed the following portions of the patient's history and confirmed they are accurate: allergies, current medications, past family history, past medical history, past social history, past surgical history and problem list    I have personally completed the patient's review of systems.    Review of Systems   Constitutional: Positive for fatigue. Negative for activity change, appetite change, chills, diaphoresis, fever, unexpected weight gain and unexpected weight loss.   HENT: Negative for ear discharge, ear pain, hearing loss, mouth sores, nosebleeds, sinus pressure, sneezing and sore throat.    Eyes: Negative for blurred vision, pain, discharge and itching.   Respiratory: Negative for cough, chest tightness, shortness of breath and wheezing.    Cardiovascular: Negative for chest pain, palpitations and leg swelling.   Gastrointestinal: Negative for abdominal pain, constipation, diarrhea, nausea and vomiting.   Endocrine: Negative for heat intolerance, polydipsia and polyphagia.   Genitourinary: Negative for dysuria, flank pain, frequency, hematuria and urgency.   Musculoskeletal: Positive for  arthralgias, back pain and myalgias. Negative for gait problem, joint swelling, neck pain and neck stiffness.   Skin: Negative for color change, pallor and rash.   Neurological: Positive for memory problem. Negative for seizures, speech difficulty, weakness, numbness and headache.   Psychiatric/Behavioral: Positive for sleep disturbance, depressed mood and stress. Negative for agitation, decreased concentration and suicidal ideas. The patient is nervous/anxious.        Current Outpatient Medications on File Prior to Visit   Medication Sig   • albuterol sulfate  (90 Base) MCG/ACT inhaler 2 puffs q4-6 hr prn cough, shortness of breath or wheezing   • Diclofenac Sodium (VOLTAREN) 1 % gel gel Apply 1 gram topically to indicated area 4 times daily as needed for mild to moderate pain.   • ferrous sulfate 325 (65 FE) MG tablet Take 1 tablet by mouth Daily With Breakfast. Take with orange juice or vitamin C   • FLUoxetine (PROzac) 40 MG capsule TAKE ONE CAPSULE BY MOUTH DAILY   • folic acid (FOLVITE) 1 MG tablet Take 1 tablet by mouth Daily.   • lisinopril (PRINIVIL,ZESTRIL) 40 MG tablet TAKE ONE TABLET BY MOUTH DAILY   • methocarbamol (ROBAXIN) 750 MG tablet Take 1/2-1 tablet by mouth 4 times daily as needed for muscle spasms.   • Pediatric Multivitamins-Iron (Flintstones Complete) 18 MG chewable tablet chewable tablet Chew 1 tablet Daily.   • potassium chloride ER (K-TAB) 20 MEQ tablet controlled-release ER tablet Take 1 tablet by mouth Daily.   • traZODone (DESYREL) 50 MG tablet Take 1-2 tablets one hour before bedtime as needed for sleep.   • [DISCONTINUED] aspirin (aspirin) 81 MG EC tablet Take 1 tablet by mouth Daily.   • [DISCONTINUED] atorvastatin (Lipitor) 40 MG tablet Take 1 tablet by mouth Every Night.   • [DISCONTINUED] bisoprolol (ZEBeta) 10 MG tablet TAKE ONE TABLET BY MOUTH DAILY   • [DISCONTINUED] prazosin (MINIPRESS) 1 MG capsule Take 1 capsule by mouth Every Night.   • [DISCONTINUED] spironolactone  "(Aldactone) 25 MG tablet Take 1 tablet by mouth Daily.   • [DISCONTINUED] thiamine (thiamine) 100 MG tablet tablet Take 1 tablet by mouth Daily.   • [DISCONTINUED] traMADol (ULTRAM) 50 MG tablet Take 1 tablet by mouth Every 6 (Six) Hours As Needed for Moderate Pain .   • [DISCONTINUED] vitamin D (ERGOCALCIFEROL) 1.25 MG (61236 UT) capsule capsule Take 1 capsule by mouth 1 (One) Time Per Week.     No current facility-administered medications on file prior to visit.       Objective   Vitals:    02/16/22 0923   BP: 168/96   Pulse: 78   Resp: 16   Temp: 98.7 °F (37.1 °C)   TempSrc: Temporal   SpO2: 99%   Weight: 62.1 kg (137 lb)   Height: 160 cm (63\")     Body mass index is 24.27 kg/m².    Physical Exam  Vitals reviewed.   Constitutional:       Appearance: Normal appearance. She is well-developed.   HENT:      Head: Normocephalic and atraumatic.      Nose: Nose normal.   Eyes:      General: Lids are normal.      Conjunctiva/sclera: Conjunctivae normal.      Pupils: Pupils are equal, round, and reactive to light.   Neck:      Thyroid: No thyromegaly.      Trachea: Trachea normal.   Cardiovascular:      Rate and Rhythm: Normal rate and regular rhythm.      Heart sounds: Normal heart sounds.   Pulmonary:      Effort: Pulmonary effort is normal. No respiratory distress.      Breath sounds: Normal breath sounds.   Skin:     General: Skin is warm and dry.   Neurological:      Mental Status: She is alert and oriented to person, place, and time.      GCS: GCS eye subscore is 4. GCS verbal subscore is 5. GCS motor subscore is 6.   Psychiatric:         Attention and Perception: Attention normal.         Mood and Affect: Mood normal.         Speech: Speech normal.         Behavior: Behavior normal. Behavior is cooperative.         Thought Content: Thought content normal.         Assessment/Plan   Problem List Items Addressed This Visit        Cardiac and Vasculature    Essential hypertension - Primary    Overview     Chronic issue " unstable requiring medication management and monitoring. Will eat well balanced heart healthy diet, drink adequate water, increase physical activity, and get adequate rest. Monitor blood pressures daily and contact office for any readings consistently above 140/90. Patient will report any associated symptoms such as headaches, blurry vision, or nausea. Patient will go to ER for any chest pressure or chest pain.   Restart bisoprolol and spironalactone. Continue lisinopril         Relevant Medications    spironolactone (Aldactone) 25 MG tablet    bisoprolol (ZEBeta) 10 MG tablet    Other Relevant Orders    CBC Auto Differential    Comprehensive Metabolic Panel    Pure hypercholesterolemia    Overview     Chronic unstable requiring medication management, lifestyle changes, and monitoring. Discussed how this being unstable increases risk of cardiovascular disease and adverse events. Will follow a heart healthy diet low in cholesterol and fat. Discussed increasing fiber intake. Suggested fish oil or omega 3 supplement of 1200mg twice daily. Educated on how these help lower triglycerides and LDL. Will drink adequate water and increase physical activity as tolerated. Discussed importance of medication compliance.   Restart lipitor         Relevant Medications    atorvastatin (Lipitor) 40 MG tablet    Other Relevant Orders    Lipid Panel       Endocrine and Metabolic    Vitamin D deficiency    Overview     Chronic issue requiring diet changes, monitoring, and dietary supplement. Will increase dietary intake of Vitamin D through dairy milk, plant/nut based milk, and fortified foods such as juice and cereal. Will start dietary supplement as directed.            Relevant Orders    Vitamin D 25 Hydroxy       Mental Health    Depression with anxiety    Overview     Chronic issue unstable requiring medication management and monitoring. Will eat well balanced diet, increase water intake, increase physical activity during the day,  and get adequate rest. Discussed relaxation and coping skills and exercises.   Start wellbutrin.         Relevant Medications    buPROPion XL (WELLBUTRIN XL) 150 MG 24 hr tablet      Other Visit Diagnoses     Iron deficiency anemia secondary to inadequate dietary iron intake     Chronic unstable. Follow a high iron diet. Take iron supplement once daily with breakfast. Discussed taking this with vitamin C such as orange juice to increase absorption. Discussed this will make stool dark in color and can cause constipation. Suggested miralax once daily if constipation occurs.  Will contact office if unable to tolerate supplement.  Continue flintstone vits with iron.        Relevant Orders    CBC Auto Differential    Comprehensive Metabolic Panel    Ferritin    Iron Profile    Vitamin B12 & Folate    Vitamin B deficiency      Chronic issue requiring diet changes, monitoring, and dietary supplement. Will increase dietary intake of Vitamin B through animal products and/or vitamin B12 or B complex supplement daily.      Relevant Orders    Vitamin B12 & Folate    Decreased appetite        Relevant Orders    T4, Free    T3, Free    TSH    Thyroid Antibodies    Low TSH level        Relevant Orders    T4, Free    T3, Free    TSH    Thyroid Antibodies    Bleeds easily (HCC)        Relevant Orders    Protime-INR             Current Outpatient Medications:   •  albuterol sulfate  (90 Base) MCG/ACT inhaler, 2 puffs q4-6 hr prn cough, shortness of breath or wheezing, Disp: 18 g, Rfl: 0  •  atorvastatin (Lipitor) 40 MG tablet, Take 1 tablet by mouth Every Night., Disp: 90 tablet, Rfl: 3  •  bisoprolol (ZEBeta) 10 MG tablet, Take 1 tablet by mouth Daily., Disp: 90 tablet, Rfl: 3  •  Diclofenac Sodium (VOLTAREN) 1 % gel gel, Apply 1 gram topically to indicated area 4 times daily as needed for mild to moderate pain., Disp: 100 g, Rfl: 2  •  ferrous sulfate 325 (65 FE) MG tablet, Take 1 tablet by mouth Daily With Breakfast. Take  with orange juice or vitamin C, Disp: 30 tablet, Rfl: 2  •  FLUoxetine (PROzac) 40 MG capsule, TAKE ONE CAPSULE BY MOUTH DAILY, Disp: 30 capsule, Rfl: 2  •  folic acid (FOLVITE) 1 MG tablet, Take 1 tablet by mouth Daily., Disp: 90 tablet, Rfl: 3  •  lisinopril (PRINIVIL,ZESTRIL) 40 MG tablet, TAKE ONE TABLET BY MOUTH DAILY, Disp: 30 tablet, Rfl: 4  •  methocarbamol (ROBAXIN) 750 MG tablet, Take 1/2-1 tablet by mouth 4 times daily as needed for muscle spasms., Disp: 60 tablet, Rfl: 2  •  Pediatric Multivitamins-Iron (Flintstones Complete) 18 MG chewable tablet chewable tablet, Chew 1 tablet Daily., Disp: , Rfl:   •  potassium chloride ER (K-TAB) 20 MEQ tablet controlled-release ER tablet, Take 1 tablet by mouth Daily., Disp: 30 tablet, Rfl: 2  •  spironolactone (Aldactone) 25 MG tablet, Take 1 tablet by mouth Daily., Disp: 90 tablet, Rfl: 3  •  traZODone (DESYREL) 50 MG tablet, Take 1-2 tablets one hour before bedtime as needed for sleep., Disp: 45 tablet, Rfl: 2  •  buPROPion XL (WELLBUTRIN XL) 150 MG 24 hr tablet, Take 1 tablet by mouth Daily., Disp: 30 tablet, Rfl: 2       Plan of care reviewed with the patient at the conclusion of today's visit.  Education was provided regarding diagnosis, management, and any prescribed or recommended OTC medications.  Patient verbalized understanding of and agreement with management plan.     Return in about 2 weeks (around 3/2/2022), or if symptoms worsen or fail to improve.      Candace Taylor, APRN

## 2022-02-17 LAB
T3FREE SERPL-MCNC: 3.49 PG/ML (ref 2–4.4)
THYROGLOB AB SERPL-ACNC: <1 IU/ML (ref 0–0.9)
THYROPEROXIDASE AB SERPL-ACNC: <8 IU/ML (ref 0–34)

## 2022-03-12 DIAGNOSIS — F41.8 DEPRESSION WITH ANXIETY: ICD-10-CM

## 2022-03-12 RX ORDER — FLUOXETINE HYDROCHLORIDE 40 MG/1
CAPSULE ORAL
Qty: 30 CAPSULE | Refills: 2 | Status: SHIPPED | OUTPATIENT
Start: 2022-03-12 | End: 2022-06-29

## 2022-03-28 ENCOUNTER — LAB (OUTPATIENT)
Dept: LAB | Facility: HOSPITAL | Age: 57
End: 2022-03-28

## 2022-03-28 ENCOUNTER — OFFICE VISIT (OUTPATIENT)
Dept: INTERNAL MEDICINE | Facility: CLINIC | Age: 57
End: 2022-03-28

## 2022-03-28 VITALS
HEIGHT: 63 IN | HEART RATE: 79 BPM | DIASTOLIC BLOOD PRESSURE: 80 MMHG | RESPIRATION RATE: 16 BRPM | OXYGEN SATURATION: 98 % | SYSTOLIC BLOOD PRESSURE: 136 MMHG | WEIGHT: 133 LBS | BODY MASS INDEX: 23.57 KG/M2 | TEMPERATURE: 97.9 F

## 2022-03-28 DIAGNOSIS — I10 ESSENTIAL HYPERTENSION: Primary | ICD-10-CM

## 2022-03-28 DIAGNOSIS — E87.6 HYPOKALEMIA: ICD-10-CM

## 2022-03-28 DIAGNOSIS — F41.8 DEPRESSION WITH ANXIETY: ICD-10-CM

## 2022-03-28 DIAGNOSIS — D50.8 IRON DEFICIENCY ANEMIA SECONDARY TO INADEQUATE DIETARY IRON INTAKE: ICD-10-CM

## 2022-03-28 LAB
ALBUMIN SERPL-MCNC: 4.3 G/DL (ref 3.5–5.2)
ALBUMIN/GLOB SERPL: 1.5 G/DL
ALP SERPL-CCNC: 108 U/L (ref 39–117)
ALT SERPL W P-5'-P-CCNC: 25 U/L (ref 1–33)
ANION GAP SERPL CALCULATED.3IONS-SCNC: 24.4 MMOL/L (ref 5–15)
AST SERPL-CCNC: 24 U/L (ref 1–32)
BASOPHILS # BLD AUTO: 0.03 10*3/MM3 (ref 0–0.2)
BASOPHILS NFR BLD AUTO: 0.3 % (ref 0–1.5)
BILIRUB SERPL-MCNC: 0.2 MG/DL (ref 0–1.2)
BUN SERPL-MCNC: 18 MG/DL (ref 6–20)
BUN/CREAT SERPL: 21.7 (ref 7–25)
CALCIUM SPEC-SCNC: 9.8 MG/DL (ref 8.6–10.5)
CHLORIDE SERPL-SCNC: 100 MMOL/L (ref 98–107)
CO2 SERPL-SCNC: 19.6 MMOL/L (ref 22–29)
CREAT SERPL-MCNC: 0.83 MG/DL (ref 0.57–1)
DEPRECATED RDW RBC AUTO: 43 FL (ref 37–54)
EGFRCR SERPLBLD CKD-EPI 2021: 83.9 ML/MIN/1.73
EOSINOPHIL # BLD AUTO: 0.02 10*3/MM3 (ref 0–0.4)
EOSINOPHIL NFR BLD AUTO: 0.2 % (ref 0.3–6.2)
ERYTHROCYTE [DISTWIDTH] IN BLOOD BY AUTOMATED COUNT: 14.1 % (ref 12.3–15.4)
GLOBULIN UR ELPH-MCNC: 2.9 GM/DL
GLUCOSE SERPL-MCNC: 81 MG/DL (ref 65–99)
HCT VFR BLD AUTO: 39.5 % (ref 34–46.6)
HGB BLD-MCNC: 12.8 G/DL (ref 12–15.9)
IMM GRANULOCYTES # BLD AUTO: 0.02 10*3/MM3 (ref 0–0.05)
IMM GRANULOCYTES NFR BLD AUTO: 0.2 % (ref 0–0.5)
LYMPHOCYTES # BLD AUTO: 2.01 10*3/MM3 (ref 0.7–3.1)
LYMPHOCYTES NFR BLD AUTO: 20.6 % (ref 19.6–45.3)
MAGNESIUM SERPL-MCNC: 2.1 MG/DL (ref 1.6–2.6)
MCH RBC QN AUTO: 27.6 PG (ref 26.6–33)
MCHC RBC AUTO-ENTMCNC: 32.4 G/DL (ref 31.5–35.7)
MCV RBC AUTO: 85.1 FL (ref 79–97)
MONOCYTES # BLD AUTO: 0.6 10*3/MM3 (ref 0.1–0.9)
MONOCYTES NFR BLD AUTO: 6.2 % (ref 5–12)
NEUTROPHILS NFR BLD AUTO: 7.06 10*3/MM3 (ref 1.7–7)
NEUTROPHILS NFR BLD AUTO: 72.5 % (ref 42.7–76)
NRBC BLD AUTO-RTO: 0 /100 WBC (ref 0–0.2)
PLATELET # BLD AUTO: 323 10*3/MM3 (ref 140–450)
PMV BLD AUTO: 10.3 FL (ref 6–12)
POTASSIUM SERPL-SCNC: 4.1 MMOL/L (ref 3.5–5.2)
PROT SERPL-MCNC: 7.2 G/DL (ref 6–8.5)
RBC # BLD AUTO: 4.64 10*6/MM3 (ref 3.77–5.28)
SODIUM SERPL-SCNC: 144 MMOL/L (ref 136–145)
WBC NRBC COR # BLD: 9.74 10*3/MM3 (ref 3.4–10.8)

## 2022-03-28 PROCEDURE — 85025 COMPLETE CBC W/AUTO DIFF WBC: CPT | Performed by: NURSE PRACTITIONER

## 2022-03-28 PROCEDURE — 99214 OFFICE O/P EST MOD 30 MIN: CPT | Performed by: NURSE PRACTITIONER

## 2022-03-28 PROCEDURE — 83735 ASSAY OF MAGNESIUM: CPT | Performed by: NURSE PRACTITIONER

## 2022-03-28 PROCEDURE — 80053 COMPREHEN METABOLIC PANEL: CPT | Performed by: NURSE PRACTITIONER

## 2022-03-28 RX ORDER — FERROUS SULFATE 325(65) MG
TABLET ORAL
Qty: 30 TABLET | Refills: 2 | Status: SHIPPED | OUTPATIENT
Start: 2022-03-28 | End: 2022-10-06

## 2022-03-28 RX ORDER — POTASSIUM CHLORIDE 1500 MG/1
20 TABLET, FILM COATED, EXTENDED RELEASE ORAL DAILY
Qty: 30 TABLET | Refills: 2
Start: 2022-03-28 | End: 2022-10-27 | Stop reason: SDUPTHER

## 2022-04-28 ENCOUNTER — OFFICE VISIT (OUTPATIENT)
Dept: INTERNAL MEDICINE | Facility: CLINIC | Age: 57
End: 2022-04-28

## 2022-04-28 VITALS
TEMPERATURE: 97.4 F | WEIGHT: 133.2 LBS | SYSTOLIC BLOOD PRESSURE: 130 MMHG | DIASTOLIC BLOOD PRESSURE: 80 MMHG | OXYGEN SATURATION: 96 % | HEART RATE: 80 BPM | BODY MASS INDEX: 23.6 KG/M2 | HEIGHT: 63 IN

## 2022-04-28 DIAGNOSIS — R53.83 OTHER FATIGUE: Primary | ICD-10-CM

## 2022-04-28 DIAGNOSIS — H61.23 BILATERAL IMPACTED CERUMEN: ICD-10-CM

## 2022-04-28 DIAGNOSIS — F33.0 MILD EPISODE OF RECURRENT MAJOR DEPRESSIVE DISORDER: ICD-10-CM

## 2022-04-28 DIAGNOSIS — J41.0 SMOKERS' COUGH: ICD-10-CM

## 2022-04-28 PROCEDURE — 99214 OFFICE O/P EST MOD 30 MIN: CPT | Performed by: NURSE PRACTITIONER

## 2022-04-28 RX ORDER — BUPROPION HYDROCHLORIDE 300 MG/1
300 TABLET ORAL DAILY
Qty: 30 TABLET | Refills: 2 | Status: SHIPPED | OUTPATIENT
Start: 2022-04-28 | End: 2022-08-09

## 2022-04-28 NOTE — PROGRESS NOTES
"Chief Complaint   Patient presents with   • Fatigue     X 1 day        HPI  Jamaica Shell is a 56 y.o. female presents for fatigue.  She states this started yesterday.  She denies any other symptoms.  She states she is feeling better today.  She states that she felt depressed yesterday and she couldn't stop crying.   She feels like her depression has been flaring up more lately.  She states her kids are driving her crazy.  Denies any suicidal thoughts.  Occasional anxiety. Currently on Fluoxetine and Bupropion.    The following portions of the patient's history were reviewed and updated as appropriate: allergies, current medications, past family history, past medical history, past social history, past surgical history and problem list.    Subjective  Review of Systems   Constitutional: Negative for activity change, appetite change and fatigue.   HENT: Negative for congestion.    Respiratory: Positive for cough (\"chronic\"). Negative for shortness of breath.    Cardiovascular: Negative for chest pain and leg swelling.   Gastrointestinal: Negative for abdominal pain.   Neurological: Negative for dizziness, weakness and confusion.   Psychiatric/Behavioral: Positive for depressed mood. Negative for behavioral problems, decreased concentration and suicidal ideas. The patient is nervous/anxious.        Objective  Visit Vitals  /80   Pulse 80   Temp 97.4 °F (36.3 °C) (Temporal)   Ht 160 cm (63\")   Wt 60.4 kg (133 lb 3.2 oz)   SpO2 96%   BMI 23.60 kg/m²        Physical Exam  Vitals and nursing note reviewed.   HENT:      Head: Normocephalic.      Right Ear: There is impacted cerumen.      Left Ear: There is impacted cerumen.   Eyes:      Pupils: Pupils are equal, round, and reactive to light.   Cardiovascular:      Rate and Rhythm: Normal rate and regular rhythm.      Pulses: Normal pulses.      Heart sounds: Normal heart sounds.   Pulmonary:      Effort: Pulmonary effort is normal. No respiratory distress.      " Breath sounds: Normal breath sounds. No wheezing.   Skin:     General: Skin is warm and dry.      Capillary Refill: Capillary refill takes less than 2 seconds.   Neurological:      General: No focal deficit present.      Mental Status: She is alert and oriented to person, place, and time.      Gait: Gait is intact.   Psychiatric:         Attention and Perception: Attention normal.         Mood and Affect: Mood normal.         Behavior: Behavior normal.          Procedures     Assessment and Plan  Diagnoses and all orders for this visit:    1. Other fatigue (Primary)    2. Smokers' cough (HCC)    3. Bilateral impacted cerumen  -     carbamide peroxide (Debrox) 6.5 % otic solution; Administer 5 drops into both ears 2 (Two) Times a Day for 7 days.  Dispense: 22 mL; Refill: 1    4. Mild episode of recurrent major depressive disorder (HCC)  -     buPROPion XL (WELLBUTRIN XL) 300 MG 24 hr tablet; Take 1 tablet by mouth Daily.  Dispense: 30 tablet; Refill: 2      Declines ear flush  Declines labs  Rx for Debrox  Increase Bupropion for depression, cont Prozac  Work note for yesterday and today    Return in about 6 weeks (around 6/9/2022) for Recheck Depression.         MARTIN Hearn

## 2022-06-28 DIAGNOSIS — F41.8 DEPRESSION WITH ANXIETY: ICD-10-CM

## 2022-06-29 RX ORDER — FLUOXETINE HYDROCHLORIDE 40 MG/1
CAPSULE ORAL
Qty: 30 CAPSULE | Refills: 2 | Status: SHIPPED | OUTPATIENT
Start: 2022-06-29 | End: 2022-10-06

## 2022-08-02 DIAGNOSIS — I10 ESSENTIAL HYPERTENSION: ICD-10-CM

## 2022-08-02 RX ORDER — LISINOPRIL 40 MG/1
TABLET ORAL
Qty: 30 TABLET | Refills: 4 | Status: SHIPPED | OUTPATIENT
Start: 2022-08-02 | End: 2023-03-14

## 2022-08-08 DIAGNOSIS — F33.0 MILD EPISODE OF RECURRENT MAJOR DEPRESSIVE DISORDER: ICD-10-CM

## 2022-08-09 RX ORDER — BUPROPION HYDROCHLORIDE 300 MG/1
TABLET ORAL
Qty: 30 TABLET | Refills: 2 | Status: SHIPPED | OUTPATIENT
Start: 2022-08-09 | End: 2022-10-27 | Stop reason: SDUPTHER

## 2022-08-24 DIAGNOSIS — G47.09 OTHER INSOMNIA: ICD-10-CM

## 2022-08-24 RX ORDER — TRAZODONE HYDROCHLORIDE 50 MG/1
TABLET ORAL
Qty: 45 TABLET | Refills: 2 | Status: SHIPPED | OUTPATIENT
Start: 2022-08-24 | End: 2022-10-27 | Stop reason: SDUPTHER

## 2022-10-05 DIAGNOSIS — F41.8 DEPRESSION WITH ANXIETY: ICD-10-CM

## 2022-10-06 RX ORDER — FLUOXETINE HYDROCHLORIDE 40 MG/1
CAPSULE ORAL
Qty: 30 CAPSULE | Refills: 2 | Status: SHIPPED | OUTPATIENT
Start: 2022-10-06 | End: 2023-03-14

## 2022-10-06 RX ORDER — BUPROPION HYDROCHLORIDE 150 MG/1
TABLET ORAL
Qty: 30 TABLET | Refills: 2 | Status: SHIPPED | OUTPATIENT
Start: 2022-10-06 | End: 2022-10-27

## 2022-10-06 RX ORDER — FERROUS SULFATE 325(65) MG
TABLET ORAL
Qty: 30 TABLET | Refills: 2 | Status: SHIPPED | OUTPATIENT
Start: 2022-10-06

## 2022-10-09 DIAGNOSIS — F10.20 ALCOHOL DEPENDENCE, DAILY USE: ICD-10-CM

## 2022-10-10 RX ORDER — FOLIC ACID 1 MG/1
TABLET ORAL
Qty: 90 TABLET | Refills: 3 | OUTPATIENT
Start: 2022-10-10

## 2022-10-27 ENCOUNTER — LAB (OUTPATIENT)
Dept: LAB | Facility: HOSPITAL | Age: 57
End: 2022-10-27

## 2022-10-27 ENCOUNTER — OFFICE VISIT (OUTPATIENT)
Dept: INTERNAL MEDICINE | Facility: CLINIC | Age: 57
End: 2022-10-27

## 2022-10-27 VITALS
BODY MASS INDEX: 23.21 KG/M2 | OXYGEN SATURATION: 98 % | HEIGHT: 63 IN | WEIGHT: 131 LBS | TEMPERATURE: 98.1 F | HEART RATE: 68 BPM | RESPIRATION RATE: 16 BRPM | SYSTOLIC BLOOD PRESSURE: 122 MMHG | DIASTOLIC BLOOD PRESSURE: 70 MMHG

## 2022-10-27 DIAGNOSIS — M25.551 RIGHT HIP PAIN: ICD-10-CM

## 2022-10-27 DIAGNOSIS — J45.20 MILD INTERMITTENT ASTHMA WITHOUT COMPLICATION: ICD-10-CM

## 2022-10-27 DIAGNOSIS — E53.8 FOLATE DEFICIENCY: ICD-10-CM

## 2022-10-27 DIAGNOSIS — G47.09 OTHER INSOMNIA: ICD-10-CM

## 2022-10-27 DIAGNOSIS — D50.8 IRON DEFICIENCY ANEMIA SECONDARY TO INADEQUATE DIETARY IRON INTAKE: ICD-10-CM

## 2022-10-27 DIAGNOSIS — E55.9 VITAMIN D DEFICIENCY: ICD-10-CM

## 2022-10-27 DIAGNOSIS — E78.00 PURE HYPERCHOLESTEROLEMIA: ICD-10-CM

## 2022-10-27 DIAGNOSIS — I10 ESSENTIAL HYPERTENSION: Primary | ICD-10-CM

## 2022-10-27 DIAGNOSIS — Z13.29 THYROID DISORDER SCREENING: ICD-10-CM

## 2022-10-27 DIAGNOSIS — F41.8 DEPRESSION WITH ANXIETY: ICD-10-CM

## 2022-10-27 DIAGNOSIS — Z13.1 SCREENING FOR DIABETES MELLITUS: ICD-10-CM

## 2022-10-27 LAB
BASOPHILS # BLD AUTO: 0.03 10*3/MM3 (ref 0–0.2)
BASOPHILS NFR BLD AUTO: 0.4 % (ref 0–1.5)
DEPRECATED RDW RBC AUTO: 43.7 FL (ref 37–54)
EOSINOPHIL # BLD AUTO: 0.04 10*3/MM3 (ref 0–0.4)
EOSINOPHIL NFR BLD AUTO: 0.5 % (ref 0.3–6.2)
ERYTHROCYTE [DISTWIDTH] IN BLOOD BY AUTOMATED COUNT: 14.8 % (ref 12.3–15.4)
HCT VFR BLD AUTO: 35 % (ref 34–46.6)
HGB BLD-MCNC: 11.3 G/DL (ref 12–15.9)
IMM GRANULOCYTES # BLD AUTO: 0.02 10*3/MM3 (ref 0–0.05)
IMM GRANULOCYTES NFR BLD AUTO: 0.3 % (ref 0–0.5)
LYMPHOCYTES # BLD AUTO: 1.97 10*3/MM3 (ref 0.7–3.1)
LYMPHOCYTES NFR BLD AUTO: 25.3 % (ref 19.6–45.3)
MCH RBC QN AUTO: 26.8 PG (ref 26.6–33)
MCHC RBC AUTO-ENTMCNC: 32.3 G/DL (ref 31.5–35.7)
MCV RBC AUTO: 83.1 FL (ref 79–97)
MONOCYTES # BLD AUTO: 0.45 10*3/MM3 (ref 0.1–0.9)
MONOCYTES NFR BLD AUTO: 5.8 % (ref 5–12)
NEUTROPHILS NFR BLD AUTO: 5.28 10*3/MM3 (ref 1.7–7)
NEUTROPHILS NFR BLD AUTO: 67.7 % (ref 42.7–76)
NRBC BLD AUTO-RTO: 0.1 /100 WBC (ref 0–0.2)
PLATELET # BLD AUTO: 357 10*3/MM3 (ref 140–450)
PMV BLD AUTO: 9.8 FL (ref 6–12)
RBC # BLD AUTO: 4.21 10*6/MM3 (ref 3.77–5.28)
WBC NRBC COR # BLD: 7.79 10*3/MM3 (ref 3.4–10.8)

## 2022-10-27 PROCEDURE — 80061 LIPID PANEL: CPT | Performed by: NURSE PRACTITIONER

## 2022-10-27 PROCEDURE — 82746 ASSAY OF FOLIC ACID SERUM: CPT | Performed by: NURSE PRACTITIONER

## 2022-10-27 PROCEDURE — 80050 GENERAL HEALTH PANEL: CPT | Performed by: NURSE PRACTITIONER

## 2022-10-27 PROCEDURE — 84466 ASSAY OF TRANSFERRIN: CPT | Performed by: NURSE PRACTITIONER

## 2022-10-27 PROCEDURE — 82728 ASSAY OF FERRITIN: CPT | Performed by: NURSE PRACTITIONER

## 2022-10-27 PROCEDURE — 82607 VITAMIN B-12: CPT | Performed by: NURSE PRACTITIONER

## 2022-10-27 PROCEDURE — 83540 ASSAY OF IRON: CPT | Performed by: NURSE PRACTITIONER

## 2022-10-27 PROCEDURE — 99214 OFFICE O/P EST MOD 30 MIN: CPT | Performed by: NURSE PRACTITIONER

## 2022-10-27 PROCEDURE — 83036 HEMOGLOBIN GLYCOSYLATED A1C: CPT | Performed by: NURSE PRACTITIONER

## 2022-10-27 PROCEDURE — 82306 VITAMIN D 25 HYDROXY: CPT | Performed by: NURSE PRACTITIONER

## 2022-10-27 RX ORDER — METHOCARBAMOL 750 MG/1
TABLET, FILM COATED ORAL
Qty: 60 TABLET | Refills: 2 | Status: SHIPPED | OUTPATIENT
Start: 2022-10-27

## 2022-10-27 RX ORDER — ALBUTEROL SULFATE 90 UG/1
AEROSOL, METERED RESPIRATORY (INHALATION)
Qty: 18 G | Refills: 0 | Status: SHIPPED | OUTPATIENT
Start: 2022-10-27

## 2022-10-27 RX ORDER — POTASSIUM CHLORIDE 1500 MG/1
20 TABLET, FILM COATED, EXTENDED RELEASE ORAL DAILY
Qty: 30 TABLET | Refills: 2 | Status: SHIPPED | OUTPATIENT
Start: 2022-10-27

## 2022-10-27 RX ORDER — BUPROPION HYDROCHLORIDE 300 MG/1
300 TABLET ORAL DAILY
Qty: 30 TABLET | Refills: 2 | Status: SHIPPED | OUTPATIENT
Start: 2022-10-27

## 2022-10-27 RX ORDER — TRAZODONE HYDROCHLORIDE 50 MG/1
TABLET ORAL
Qty: 45 TABLET | Refills: 2 | Status: SHIPPED | OUTPATIENT
Start: 2022-10-27

## 2022-10-27 RX ORDER — FOLIC ACID 1 MG/1
1 TABLET ORAL DAILY
Qty: 90 TABLET | Refills: 3 | Status: SHIPPED | OUTPATIENT
Start: 2022-10-27

## 2022-10-27 RX ORDER — POTASSIUM CHLORIDE 1500 MG/1
20 TABLET, FILM COATED, EXTENDED RELEASE ORAL DAILY
Qty: 30 TABLET | Refills: 2
Start: 2022-10-27 | End: 2022-10-27

## 2022-10-28 LAB
25(OH)D3 SERPL-MCNC: 36.4 NG/ML (ref 30–100)
ALBUMIN SERPL-MCNC: 4.2 G/DL (ref 3.5–5.2)
ALBUMIN/GLOB SERPL: 1.6 G/DL
ALP SERPL-CCNC: 106 U/L (ref 39–117)
ALT SERPL W P-5'-P-CCNC: 19 U/L (ref 1–33)
ANION GAP SERPL CALCULATED.3IONS-SCNC: 10.1 MMOL/L (ref 5–15)
AST SERPL-CCNC: 23 U/L (ref 1–32)
BILIRUB SERPL-MCNC: 0.3 MG/DL (ref 0–1.2)
BUN SERPL-MCNC: 27 MG/DL (ref 6–20)
BUN/CREAT SERPL: 23.1 (ref 7–25)
CALCIUM SPEC-SCNC: 9.8 MG/DL (ref 8.6–10.5)
CHLORIDE SERPL-SCNC: 103 MMOL/L (ref 98–107)
CHOLEST SERPL-MCNC: 370 MG/DL (ref 0–200)
CO2 SERPL-SCNC: 23.9 MMOL/L (ref 22–29)
CREAT SERPL-MCNC: 1.17 MG/DL (ref 0.57–1)
EGFRCR SERPLBLD CKD-EPI 2021: 55.6 ML/MIN/1.73
FERRITIN SERPL-MCNC: 214 NG/ML (ref 13–150)
FOLATE SERPL-MCNC: >20 NG/ML (ref 4.78–24.2)
GLOBULIN UR ELPH-MCNC: 2.7 GM/DL
GLUCOSE SERPL-MCNC: 84 MG/DL (ref 65–99)
HBA1C MFR BLD: 5.4 % (ref 4.8–5.6)
HDLC SERPL-MCNC: 63 MG/DL (ref 40–60)
IRON 24H UR-MRATE: 95 MCG/DL (ref 37–145)
IRON SATN MFR SERPL: 24 % (ref 20–50)
LDLC SERPL CALC-MCNC: 221 MG/DL (ref 0–100)
LDLC/HDLC SERPL: 3.59 {RATIO}
POTASSIUM SERPL-SCNC: 4.7 MMOL/L (ref 3.5–5.2)
PROT SERPL-MCNC: 6.9 G/DL (ref 6–8.5)
SODIUM SERPL-SCNC: 137 MMOL/L (ref 136–145)
TIBC SERPL-MCNC: 390 MCG/DL (ref 298–536)
TRANSFERRIN SERPL-MCNC: 262 MG/DL (ref 200–360)
TRIGL SERPL-MCNC: 404 MG/DL (ref 0–150)
TSH SERPL DL<=0.05 MIU/L-ACNC: 0.59 UIU/ML (ref 0.27–4.2)
VIT B12 BLD-MCNC: 437 PG/ML (ref 211–946)
VLDLC SERPL-MCNC: 86 MG/DL (ref 5–40)

## 2022-11-02 DIAGNOSIS — Z12.31 ENCOUNTER FOR SCREENING MAMMOGRAM FOR MALIGNANT NEOPLASM OF BREAST: ICD-10-CM

## 2022-11-02 DIAGNOSIS — Z12.11 SCREENING FOR COLON CANCER: Primary | ICD-10-CM

## 2022-11-28 ENCOUNTER — TELEPHONE (OUTPATIENT)
Dept: INTERNAL MEDICINE | Facility: CLINIC | Age: 57
End: 2022-11-28

## 2022-11-28 RX ORDER — ATORVASTATIN CALCIUM 80 MG/1
80 TABLET, FILM COATED ORAL DAILY
Qty: 30 TABLET | Refills: 5 | Status: SHIPPED | OUTPATIENT
Start: 2022-11-28

## 2022-11-28 NOTE — TELEPHONE ENCOUNTER
----- Message from MARTIN Murray sent at 11/28/2022 10:33 AM EST -----  Please contact patient and advise:    Your kidney function has dropped down. Make sure to be drinking plenty of water, refrain from nsaids (advil, motrin, aleve, naproxen, ibuprofen). Your cholesterol is very elevated. I am increasing the lipitor to reduce risk of heart attack and stroke. Sent this to the pharmacy. You are anemic. Make sure to be eating a low cholesterol/fat and high iron diet. I want to repeat fasting labs at your next appt.

## 2022-12-13 DIAGNOSIS — H61.23 BILATERAL IMPACTED CERUMEN: ICD-10-CM

## 2022-12-14 RX ORDER — ADHESIVE BANDAGE 3/4"
BANDAGE TOPICAL
Qty: 15 ML | OUTPATIENT
Start: 2022-12-14

## 2022-12-21 ENCOUNTER — APPOINTMENT (OUTPATIENT)
Dept: MAMMOGRAPHY | Facility: HOSPITAL | Age: 57
End: 2022-12-21

## 2023-03-13 DIAGNOSIS — F41.8 DEPRESSION WITH ANXIETY: ICD-10-CM

## 2023-03-13 DIAGNOSIS — I10 ESSENTIAL HYPERTENSION: ICD-10-CM

## 2023-03-14 RX ORDER — BUPROPION HYDROCHLORIDE 150 MG/1
TABLET ORAL
Qty: 30 TABLET | Refills: 3 | Status: SHIPPED | OUTPATIENT
Start: 2023-03-14

## 2023-03-14 RX ORDER — LISINOPRIL 40 MG/1
TABLET ORAL
Qty: 30 TABLET | Refills: 4 | Status: SHIPPED | OUTPATIENT
Start: 2023-03-14

## 2023-03-14 RX ORDER — FLUOXETINE HYDROCHLORIDE 40 MG/1
CAPSULE ORAL
Qty: 30 CAPSULE | Refills: 2 | Status: SHIPPED | OUTPATIENT
Start: 2023-03-14

## 2023-04-27 ENCOUNTER — OFFICE VISIT (OUTPATIENT)
Dept: INTERNAL MEDICINE | Facility: CLINIC | Age: 58
End: 2023-04-27
Payer: MEDICAID

## 2023-04-27 ENCOUNTER — LAB (OUTPATIENT)
Dept: LAB | Facility: HOSPITAL | Age: 58
End: 2023-04-27
Payer: MEDICAID

## 2023-04-27 VITALS
DIASTOLIC BLOOD PRESSURE: 76 MMHG | SYSTOLIC BLOOD PRESSURE: 124 MMHG | WEIGHT: 132 LBS | TEMPERATURE: 98.1 F | BODY MASS INDEX: 23.39 KG/M2 | OXYGEN SATURATION: 98 % | HEIGHT: 63 IN | HEART RATE: 58 BPM | RESPIRATION RATE: 16 BRPM

## 2023-04-27 DIAGNOSIS — E53.8 FOLATE DEFICIENCY: ICD-10-CM

## 2023-04-27 DIAGNOSIS — J45.20 MILD INTERMITTENT ASTHMA WITHOUT COMPLICATION: ICD-10-CM

## 2023-04-27 DIAGNOSIS — E78.2 MIXED HYPERLIPIDEMIA: ICD-10-CM

## 2023-04-27 DIAGNOSIS — R26.81 UNSTABLE GAIT: ICD-10-CM

## 2023-04-27 DIAGNOSIS — Z12.11 SCREEN FOR COLON CANCER: ICD-10-CM

## 2023-04-27 DIAGNOSIS — I10 ESSENTIAL HYPERTENSION: ICD-10-CM

## 2023-04-27 DIAGNOSIS — R41.3 MEMORY LOSS: ICD-10-CM

## 2023-04-27 DIAGNOSIS — Z13.21 ENCOUNTER FOR VITAMIN DEFICIENCY SCREENING: ICD-10-CM

## 2023-04-27 DIAGNOSIS — F41.8 DEPRESSION WITH ANXIETY: ICD-10-CM

## 2023-04-27 DIAGNOSIS — R29.898 WEAKNESS OF BOTH LOWER EXTREMITIES: ICD-10-CM

## 2023-04-27 DIAGNOSIS — M25.551 RIGHT HIP PAIN: ICD-10-CM

## 2023-04-27 DIAGNOSIS — G47.09 OTHER INSOMNIA: ICD-10-CM

## 2023-04-27 DIAGNOSIS — I10 ESSENTIAL HYPERTENSION: Primary | ICD-10-CM

## 2023-04-27 DIAGNOSIS — J30.89 ENVIRONMENTAL AND SEASONAL ALLERGIES: ICD-10-CM

## 2023-04-27 DIAGNOSIS — E55.9 VITAMIN D DEFICIENCY: ICD-10-CM

## 2023-04-27 DIAGNOSIS — Z12.31 ENCOUNTER FOR SCREENING MAMMOGRAM FOR MALIGNANT NEOPLASM OF BREAST: ICD-10-CM

## 2023-04-27 LAB
25(OH)D3 SERPL-MCNC: 32.5 NG/ML (ref 30–100)
ALBUMIN SERPL-MCNC: 4.5 G/DL (ref 3.5–5.2)
ALBUMIN/GLOB SERPL: 1.7 G/DL
ALP SERPL-CCNC: 161 U/L (ref 39–117)
ALT SERPL W P-5'-P-CCNC: 33 U/L (ref 1–33)
ANION GAP SERPL CALCULATED.3IONS-SCNC: 13.6 MMOL/L (ref 5–15)
AST SERPL-CCNC: 50 U/L (ref 1–32)
BASOPHILS # BLD AUTO: 0.04 10*3/MM3 (ref 0–0.2)
BASOPHILS NFR BLD AUTO: 0.6 % (ref 0–1.5)
BILIRUB SERPL-MCNC: 0.3 MG/DL (ref 0–1.2)
BUN SERPL-MCNC: 19 MG/DL (ref 6–20)
BUN/CREAT SERPL: 18.8 (ref 7–25)
CALCIUM SPEC-SCNC: 10.3 MG/DL (ref 8.6–10.5)
CHLORIDE SERPL-SCNC: 101 MMOL/L (ref 98–107)
CHOLEST SERPL-MCNC: 270 MG/DL (ref 0–200)
CO2 SERPL-SCNC: 22.4 MMOL/L (ref 22–29)
CREAT SERPL-MCNC: 1.01 MG/DL (ref 0.57–1)
DEPRECATED RDW RBC AUTO: 38.8 FL (ref 37–54)
EGFRCR SERPLBLD CKD-EPI 2021: 65.9 ML/MIN/1.73
EOSINOPHIL # BLD AUTO: 0.03 10*3/MM3 (ref 0–0.4)
EOSINOPHIL NFR BLD AUTO: 0.4 % (ref 0.3–6.2)
ERYTHROCYTE [DISTWIDTH] IN BLOOD BY AUTOMATED COUNT: 13.3 % (ref 12.3–15.4)
FOLATE SERPL-MCNC: >20 NG/ML (ref 4.78–24.2)
GLOBULIN UR ELPH-MCNC: 2.7 GM/DL
GLUCOSE SERPL-MCNC: 85 MG/DL (ref 65–99)
HCT VFR BLD AUTO: 38.1 % (ref 34–46.6)
HDLC SERPL-MCNC: 85 MG/DL (ref 40–60)
HGB BLD-MCNC: 12.3 G/DL (ref 12–15.9)
IMM GRANULOCYTES # BLD AUTO: 0.02 10*3/MM3 (ref 0–0.05)
IMM GRANULOCYTES NFR BLD AUTO: 0.3 % (ref 0–0.5)
LDLC SERPL CALC-MCNC: 162 MG/DL (ref 0–100)
LDLC/HDLC SERPL: 1.86 {RATIO}
LYMPHOCYTES # BLD AUTO: 2.56 10*3/MM3 (ref 0.7–3.1)
LYMPHOCYTES NFR BLD AUTO: 37.6 % (ref 19.6–45.3)
MCH RBC QN AUTO: 26.3 PG (ref 26.6–33)
MCHC RBC AUTO-ENTMCNC: 32.3 G/DL (ref 31.5–35.7)
MCV RBC AUTO: 81.6 FL (ref 79–97)
MONOCYTES # BLD AUTO: 0.46 10*3/MM3 (ref 0.1–0.9)
MONOCYTES NFR BLD AUTO: 6.8 % (ref 5–12)
NEUTROPHILS NFR BLD AUTO: 3.7 10*3/MM3 (ref 1.7–7)
NEUTROPHILS NFR BLD AUTO: 54.3 % (ref 42.7–76)
NRBC BLD AUTO-RTO: 0 /100 WBC (ref 0–0.2)
PLATELET # BLD AUTO: 368 10*3/MM3 (ref 140–450)
PMV BLD AUTO: 9.5 FL (ref 6–12)
POTASSIUM SERPL-SCNC: 4.4 MMOL/L (ref 3.5–5.2)
PROT SERPL-MCNC: 7.2 G/DL (ref 6–8.5)
RBC # BLD AUTO: 4.67 10*6/MM3 (ref 3.77–5.28)
SODIUM SERPL-SCNC: 137 MMOL/L (ref 136–145)
TRIGL SERPL-MCNC: 133 MG/DL (ref 0–150)
VIT B12 BLD-MCNC: 431 PG/ML (ref 211–946)
VLDLC SERPL-MCNC: 23 MG/DL (ref 5–40)
WBC NRBC COR # BLD: 6.81 10*3/MM3 (ref 3.4–10.8)

## 2023-04-27 PROCEDURE — 82306 VITAMIN D 25 HYDROXY: CPT | Performed by: NURSE PRACTITIONER

## 2023-04-27 PROCEDURE — 85025 COMPLETE CBC W/AUTO DIFF WBC: CPT | Performed by: NURSE PRACTITIONER

## 2023-04-27 PROCEDURE — 82607 VITAMIN B-12: CPT | Performed by: NURSE PRACTITIONER

## 2023-04-27 PROCEDURE — 80053 COMPREHEN METABOLIC PANEL: CPT | Performed by: NURSE PRACTITIONER

## 2023-04-27 PROCEDURE — 82746 ASSAY OF FOLIC ACID SERUM: CPT | Performed by: NURSE PRACTITIONER

## 2023-04-27 PROCEDURE — 80061 LIPID PANEL: CPT | Performed by: NURSE PRACTITIONER

## 2023-04-27 RX ORDER — BUPROPION HYDROCHLORIDE 300 MG/1
300 TABLET ORAL DAILY
Qty: 30 TABLET | Refills: 2 | Status: SHIPPED | OUTPATIENT
Start: 2023-04-27

## 2023-04-27 RX ORDER — SPIRONOLACTONE 25 MG/1
25 TABLET ORAL DAILY
Qty: 90 TABLET | Refills: 3 | Status: CANCELLED
Start: 2023-04-27

## 2023-04-27 RX ORDER — METHOCARBAMOL 750 MG/1
TABLET, FILM COATED ORAL
Qty: 60 TABLET | Refills: 2 | Status: SHIPPED | OUTPATIENT
Start: 2023-04-27

## 2023-04-27 RX ORDER — ATORVASTATIN CALCIUM 80 MG/1
80 TABLET, FILM COATED ORAL DAILY
Qty: 30 TABLET | Refills: 5 | Status: SHIPPED | OUTPATIENT
Start: 2023-04-27

## 2023-04-27 RX ORDER — TRAZODONE HYDROCHLORIDE 50 MG/1
TABLET ORAL
Qty: 45 TABLET | Refills: 2 | Status: SHIPPED | OUTPATIENT
Start: 2023-04-27

## 2023-04-27 RX ORDER — POTASSIUM CHLORIDE 1500 MG/1
20 TABLET, FILM COATED, EXTENDED RELEASE ORAL DAILY
Qty: 30 TABLET | Refills: 2 | Status: CANCELLED | OUTPATIENT
Start: 2023-04-27

## 2023-04-27 RX ORDER — LORATADINE 10 MG/1
10 TABLET ORAL DAILY
Qty: 30 TABLET | Refills: 5 | Status: SHIPPED | OUTPATIENT
Start: 2023-04-27

## 2023-04-27 RX ORDER — FOLIC ACID 1 MG/1
1 TABLET ORAL DAILY
Qty: 90 TABLET | Refills: 3 | Status: SHIPPED | OUTPATIENT
Start: 2023-04-27

## 2023-04-27 RX ORDER — POTASSIUM CHLORIDE 1500 MG/1
20 TABLET, FILM COATED, EXTENDED RELEASE ORAL DAILY
Qty: 30 TABLET | Refills: 2 | Status: SHIPPED | OUTPATIENT
Start: 2023-04-27

## 2023-04-27 RX ORDER — FERROUS SULFATE 325(65) MG
325 TABLET ORAL
Qty: 30 TABLET | Refills: 2 | Status: CANCELLED | OUTPATIENT
Start: 2023-04-27

## 2023-04-27 RX ORDER — BISOPROLOL FUMARATE 10 MG/1
10 TABLET, FILM COATED ORAL DAILY
Qty: 90 TABLET | Refills: 3 | Status: SHIPPED | OUTPATIENT
Start: 2023-04-27

## 2023-04-27 RX ORDER — METHOCARBAMOL 750 MG/1
TABLET, FILM COATED ORAL
Qty: 60 TABLET | Refills: 2 | Status: CANCELLED | OUTPATIENT
Start: 2023-04-27

## 2023-04-27 RX ORDER — FOLIC ACID 1 MG/1
1 TABLET ORAL DAILY
Qty: 90 TABLET | Refills: 3 | Status: CANCELLED | OUTPATIENT
Start: 2023-04-27

## 2023-04-27 RX ORDER — MONTELUKAST SODIUM 10 MG/1
10 TABLET ORAL NIGHTLY
Qty: 30 TABLET | Refills: 5 | Status: SHIPPED | OUTPATIENT
Start: 2023-04-27

## 2023-04-27 RX ORDER — IBUPROFEN 800 MG/1
TABLET ORAL
COMMUNITY
Start: 2023-03-16

## 2023-04-27 RX ORDER — TRAZODONE HYDROCHLORIDE 50 MG/1
TABLET ORAL
Qty: 45 TABLET | Refills: 2 | Status: CANCELLED | OUTPATIENT
Start: 2023-04-27

## 2023-04-27 RX ORDER — BUPROPION HYDROCHLORIDE 300 MG/1
300 TABLET ORAL DAILY
Qty: 30 TABLET | Refills: 2 | Status: CANCELLED | OUTPATIENT
Start: 2023-04-27

## 2023-04-27 RX ORDER — FERROUS SULFATE 325(65) MG
325 TABLET ORAL
Qty: 30 TABLET | Refills: 5 | Status: SHIPPED | OUTPATIENT
Start: 2023-04-27

## 2023-04-27 RX ORDER — ATORVASTATIN CALCIUM 80 MG/1
80 TABLET, FILM COATED ORAL DAILY
Qty: 30 TABLET | Refills: 5 | Status: CANCELLED | OUTPATIENT
Start: 2023-04-27

## 2023-04-27 RX ORDER — LISINOPRIL 40 MG/1
40 TABLET ORAL DAILY
Qty: 30 TABLET | Refills: 4 | Status: CANCELLED | OUTPATIENT
Start: 2023-04-27

## 2023-04-27 RX ORDER — ALBUTEROL SULFATE 90 UG/1
AEROSOL, METERED RESPIRATORY (INHALATION)
Qty: 18 G | Refills: 0 | Status: SHIPPED | OUTPATIENT
Start: 2023-04-27

## 2023-04-27 RX ORDER — SPIRONOLACTONE 25 MG/1
25 TABLET ORAL DAILY
Qty: 90 TABLET | Refills: 3
Start: 2023-04-27

## 2023-04-27 RX ORDER — BISOPROLOL FUMARATE 10 MG/1
10 TABLET, FILM COATED ORAL DAILY
Qty: 90 TABLET | Refills: 3 | Status: CANCELLED | OUTPATIENT
Start: 2023-04-27

## 2023-04-27 NOTE — PROGRESS NOTES
Subjective   Chief Complaint   Patient presents with   • Hypertension      Jamaica Shell is a 57 y.o. female.     The patient is here today for follow-up on hypertension, depression, anxiety, and vitamin deficiencies. The patient's blood pressure is stable and at goal today. She is accompanied by an adult female.    The patient is well today. She denies any changes with medications.     The patient admits to allergy symptoms with wheezing and itching. She would like medication for this.    The patient takes ibuprofen 800 mg as needed. She admits to staying hydrated.     The adult female reports that the patient is experiencing unbalanced gait. She would like to know if the patient qualifies for home health. The patient admits that she feels unstable without her walker.     The patient states her appetite is unwell. She admits to eating one meal in the evening. She takes her vitamins daily.     The patient admits to drinking wine with her depression medication. The adult female states the wine intensifiies her depression with her medication. She admits to expereincing bad dreams.     I have reviewed the following portions of the patient's history and confirmed they are accurate: allergies, current medications, past family history, past medical history, past social history, past surgical history, and problem list    I have personally completed the patient's review of systems.    Review of Systems   Constitutional: Positive for appetite change and fatigue. Negative for activity change, chills, diaphoresis, fever, unexpected weight gain and unexpected weight loss.   HENT: Negative for ear discharge, ear pain, hearing loss, mouth sores, nosebleeds, sinus pressure, sneezing and sore throat.    Eyes: Negative for blurred vision, pain, discharge and itching.   Respiratory: Negative for cough, chest tightness, shortness of breath and wheezing.    Cardiovascular: Negative for chest pain, palpitations and leg swelling.    Gastrointestinal: Negative for abdominal pain, constipation, diarrhea, nausea and vomiting.   Endocrine: Negative for heat intolerance, polydipsia and polyphagia.   Genitourinary: Negative for dysuria, flank pain, frequency, hematuria and urgency.   Musculoskeletal: Positive for arthralgias, back pain, gait problem and myalgias. Negative for joint swelling, neck pain and neck stiffness.   Skin: Negative for color change, pallor and rash.   Neurological: Positive for weakness and memory problem. Negative for seizures, speech difficulty, numbness and headache.   Psychiatric/Behavioral: Positive for depressed mood. Negative for agitation, decreased concentration, sleep disturbance and suicidal ideas. The patient is nervous/anxious.        Current Outpatient Medications on File Prior to Visit   Medication Sig   • Diclofenac Sodium (VOLTAREN) 1 % gel gel Apply 1 gram topically to indicated area 4 times daily as needed for mild to moderate pain.   • FLUoxetine (PROzac) 40 MG capsule TAKE ONE CAPSULE BY MOUTH DAILY   • ibuprofen (ADVIL,MOTRIN) 800 MG tablet    • lisinopril (PRINIVIL,ZESTRIL) 40 MG tablet TAKE ONE TABLET BY MOUTH DAILY   • [DISCONTINUED] albuterol sulfate  (90 Base) MCG/ACT inhaler 2 puffs q4-6 hr prn cough, shortness of breath or wheezing   • [DISCONTINUED] atorvastatin (Lipitor) 80 MG tablet Take 1 tablet by mouth Daily.   • [DISCONTINUED] bisoprolol (ZEBeta) 10 MG tablet Take 1 tablet by mouth Daily.   • [DISCONTINUED] buPROPion XL (WELLBUTRIN XL) 150 MG 24 hr tablet TAKE ONE TABLET BY MOUTH DAILY   • [DISCONTINUED] buPROPion XL (WELLBUTRIN XL) 300 MG 24 hr tablet Take 1 tablet by mouth Daily.   • [DISCONTINUED] FeroSul 325 (65 Fe) MG tablet TAKE ONE TABLET BY MOUTH DAILY WITH BREAKFAST. TAKE WITH ORANGE JUICE OR VITAMIN-C   • [DISCONTINUED] folic acid (FOLVITE) 1 MG tablet Take 1 tablet by mouth Daily.   • [DISCONTINUED] methocarbamol (ROBAXIN) 750 MG tablet Take 1/2-1 tablet by mouth 4 times  "daily as needed for muscle spasms.   • [DISCONTINUED] Pediatric Multivitamins-Iron (Flintstones Complete) 18 MG chewable tablet chewable tablet Chew 1 tablet Daily.   • [DISCONTINUED] potassium chloride ER (K-TAB) 20 MEQ tablet controlled-release ER tablet Take 1 tablet by mouth Daily.   • [DISCONTINUED] spironolactone (Aldactone) 25 MG tablet Take 1 tablet by mouth Daily.   • [DISCONTINUED] traZODone (DESYREL) 50 MG tablet TAKE 1 TO 2 TABLETS BY MOUTH 1 HOUR BEFORE BEDTIME AS NEEDED FOR SLEEP     No current facility-administered medications on file prior to visit.       Objective   Vitals:    04/27/23 0858   BP: 124/76   Pulse: 58   Resp: 16   Temp: 98.1 °F (36.7 °C)   TempSrc: Tympanic   SpO2: 98%   Weight: 59.9 kg (132 lb)   Height: 160 cm (63\")     Body mass index is 23.38 kg/m².    Physical Exam  Vitals reviewed.   Constitutional:       Appearance: Normal appearance. She is well-developed.   HENT:      Head: Normocephalic and atraumatic.      Nose: Nose normal.   Eyes:      General: Lids are normal.      Conjunctiva/sclera: Conjunctivae normal.      Pupils: Pupils are equal, round, and reactive to light.   Neck:      Thyroid: No thyromegaly.      Trachea: Trachea normal.   Cardiovascular:      Rate and Rhythm: Normal rate and regular rhythm.      Heart sounds: Normal heart sounds.   Pulmonary:      Effort: Pulmonary effort is normal. No respiratory distress.      Breath sounds: Normal breath sounds.   Skin:     General: Skin is warm and dry.   Neurological:      Mental Status: She is alert and oriented to person, place, and time.      GCS: GCS eye subscore is 4. GCS verbal subscore is 5. GCS motor subscore is 6.   Psychiatric:         Attention and Perception: Attention normal.         Mood and Affect: Mood normal.         Speech: Speech normal.         Behavior: Behavior normal. Behavior is cooperative.         Thought Content: Thought content normal.         Assessment & Plan   Problem List Items Addressed " This Visit        Cardiac and Vasculature    Essential hypertension - Primary    Relevant Orders    CBC Auto Differential    Comprehensive Metabolic Panel    Lipid Panel       Endocrine and Metabolic    Vitamin D deficiency    Relevant Orders    Vitamin D,25-Hydroxy       Mental Health    Depression with anxiety   Other Visit Diagnoses     Mixed hyperlipidemia        Relevant Orders    CBC Auto Differential    Comprehensive Metabolic Panel    Lipid Panel    Environmental and seasonal allergies        Relevant Medications    loratadine (CLARITIN) 10 MG tablet    montelukast (SINGULAIR) 10 MG tablet    Weakness of both lower extremities        Relevant Orders    Vitamin B12 & Folate    Folate deficiency        Relevant Orders    Vitamin B12 & Folate    Encounter for screening mammogram for malignant neoplasm of breast        Relevant Orders    Mammo Screening Digital Tomosynthesis Bilateral With CAD    Screen for colon cancer        Relevant Orders    Cologuard - Stool, Per Rectum    Unstable gait        Relevant Orders    Vitamin B12 & Folate    Encounter for vitamin deficiency screening        Relevant Orders    Vitamin B12 & Folate    Vitamin D,25-Hydroxy         1. Hypertension- Chronic, stable.  Continue lisinopril, bisoprolol, and spironolactone.   Follow a heart healthy, low cholesterol diet.    2. Mixed hyperlipidemia- Chronic, unstable.  Continue atorvastatin.   Rechecking fasting lipid panel.    3. Depression with anxiety- Chronic, stable.  Continue Prozac and Wellbutrin.   Continue trazodone for sleep.    4. Weakness of lower extremities- Chronic, unstable.  Referring to home health for consult.    5. Environmental seasonal allergies- Chronic, unstable.  Start Singulair and Claritin.   She will follow up if this does not improve her symptoms.      Current Outpatient Medications:   •  Diclofenac Sodium (VOLTAREN) 1 % gel gel, Apply 1 gram topically to indicated area 4 times daily as needed for mild to  moderate pain., Disp: 100 g, Rfl: 2  •  FLUoxetine (PROzac) 40 MG capsule, TAKE ONE CAPSULE BY MOUTH DAILY, Disp: 30 capsule, Rfl: 2  •  ibuprofen (ADVIL,MOTRIN) 800 MG tablet, , Disp: , Rfl:   •  lisinopril (PRINIVIL,ZESTRIL) 40 MG tablet, TAKE ONE TABLET BY MOUTH DAILY, Disp: 30 tablet, Rfl: 4  •  albuterol sulfate  (90 Base) MCG/ACT inhaler, 2 puffs q4-6 hr prn cough, shortness of breath or wheezing, Disp: 18 g, Rfl: 0  •  atorvastatin (Lipitor) 80 MG tablet, Take 1 tablet by mouth Daily., Disp: 30 tablet, Rfl: 5  •  bisoprolol (ZEBeta) 10 MG tablet, Take 1 tablet by mouth Daily., Disp: 90 tablet, Rfl: 3  •  buPROPion XL (WELLBUTRIN XL) 300 MG 24 hr tablet, Take 1 tablet by mouth Daily., Disp: 30 tablet, Rfl: 2  •  ferrous sulfate (FeroSul) 325 (65 FE) MG tablet, Take 1 tablet by mouth Daily With Breakfast., Disp: 30 tablet, Rfl: 5  •  folic acid (FOLVITE) 1 MG tablet, Take 1 tablet by mouth Daily., Disp: 90 tablet, Rfl: 3  •  loratadine (CLARITIN) 10 MG tablet, Take 1 tablet by mouth Daily., Disp: 30 tablet, Rfl: 5  •  methocarbamol (ROBAXIN) 750 MG tablet, Take 1/2-1 tablet by mouth 4 times daily as needed for muscle spasms., Disp: 60 tablet, Rfl: 2  •  montelukast (SINGULAIR) 10 MG tablet, Take 1 tablet by mouth Every Night., Disp: 30 tablet, Rfl: 5  •  potassium chloride ER (K-TAB) 20 MEQ tablet controlled-release ER tablet, Take 1 tablet by mouth Daily., Disp: 30 tablet, Rfl: 2  •  spironolactone (Aldactone) 25 MG tablet, Take 1 tablet by mouth Daily., Disp: 90 tablet, Rfl: 3  •  traZODone (DESYREL) 50 MG tablet, TAKE 1 TO 2 TABLETS BY MOUTH 1 HOUR BEFORE BEDTIME AS NEEDED FOR SLEEP, Disp: 45 tablet, Rfl: 2       Plan of care reviewed with the patient at the conclusion of today's visit.  Education was provided regarding diagnosis, management, and any prescribed or recommended OTC medications.  Patient verbalized understanding of and agreement with management plan.     Return in about 3 months (around  7/27/2023), or if symptoms worsen or fail to improve, for Follow-up.      Transcribed from ambient dictation for MARTIN Ramirez by Kimi Zacarias.  04/27/23   10:58 EDT    Patient or patient representative verbalized consent to the visit recording.  I have personally performed the services described in this document as transcribed by the above individual, and it is both accurate and complete.

## 2023-05-09 DIAGNOSIS — J45.20 MILD INTERMITTENT ASTHMA WITHOUT COMPLICATION: ICD-10-CM

## 2023-05-10 RX ORDER — ALBUTEROL SULFATE 90 UG/1
AEROSOL, METERED RESPIRATORY (INHALATION)
Qty: 18 G | Refills: 0 | Status: SHIPPED | OUTPATIENT
Start: 2023-05-10

## 2023-05-15 ENCOUNTER — TELEPHONE (OUTPATIENT)
Dept: INTERNAL MEDICINE | Facility: CLINIC | Age: 58
End: 2023-05-15
Payer: MEDICAID

## 2023-05-15 NOTE — TELEPHONE ENCOUNTER
Monalisa - patient is having lower extremity weakness and needs OT and PT. I do not have any other diagnosis for her. See if a different company will take on her home health. Thank you!

## 2023-05-15 NOTE — TELEPHONE ENCOUNTER
"[facility] called and stated \" Thank you for this referral, however we will need a medical diagnosis as the cause for her weakness in order to have a Medicare approved skilled need for HH - please resubmit referral - with medical diagnosis  - for our review.  Thank you\" please advise.    "

## 2023-05-18 ENCOUNTER — TELEPHONE (OUTPATIENT)
Dept: INTERNAL MEDICINE | Facility: CLINIC | Age: 58
End: 2023-05-18
Payer: MEDICAID

## 2023-05-18 ENCOUNTER — HOME HEALTH ADMISSION (OUTPATIENT)
Dept: HOME HEALTH SERVICES | Facility: HOME HEALTHCARE | Age: 58
End: 2023-05-18
Payer: MEDICAID

## 2023-07-23 DIAGNOSIS — I10 ESSENTIAL HYPERTENSION: ICD-10-CM

## 2023-07-24 RX ORDER — POTASSIUM CHLORIDE 1500 MG/1
TABLET, EXTENDED RELEASE ORAL
Qty: 30 TABLET | Refills: 2 | Status: SHIPPED | OUTPATIENT
Start: 2023-07-24

## 2023-08-21 ENCOUNTER — TELEPHONE (OUTPATIENT)
Dept: INTERNAL MEDICINE | Facility: CLINIC | Age: 58
End: 2023-08-21
Payer: MEDICAID

## 2023-08-21 NOTE — TELEPHONE ENCOUNTER
Please contact patient and ask what dose of wellbutrin she is taking. Office is filling both 150mg and 300mg at different times. Unsure if she is taking one of these dosages or taking 450mg daily.

## 2023-08-23 ENCOUNTER — TELEPHONE (OUTPATIENT)
Dept: INTERNAL MEDICINE | Facility: CLINIC | Age: 58
End: 2023-08-23

## 2023-08-23 NOTE — TELEPHONE ENCOUNTER
Caller: Jamaica Shell    Relationship: Self    Best call back number: [phone]     What was the call regarding: PATIENT STATES SHE WAS TALKING TO SOMEONE IN THE OFFICE EARLIER ABOUT BUPROPION AND THE DOSING AND SHE WOULD LIKE THEM TO CALL HER BACK TO DISCUSS THIS FURTHER.      Is it okay if the provider responds through MyChart: NO

## 2023-08-23 NOTE — TELEPHONE ENCOUNTER
Pt is not sure as her  does her pills for her. Pt is going to call office back after work today when she gets home to let us know what she has been taken.

## 2023-08-24 NOTE — TELEPHONE ENCOUNTER
Pt returned call yesterday, duplicate encounter made. I attempted to call pt back. lvm to return call

## 2023-09-02 DIAGNOSIS — F41.8 DEPRESSION WITH ANXIETY: ICD-10-CM

## 2023-09-06 RX ORDER — FLUOXETINE HYDROCHLORIDE 40 MG/1
CAPSULE ORAL
Qty: 30 CAPSULE | Refills: 2 | Status: SHIPPED | OUTPATIENT
Start: 2023-09-06

## 2023-09-11 ENCOUNTER — OFFICE VISIT (OUTPATIENT)
Dept: INTERNAL MEDICINE | Facility: CLINIC | Age: 58
End: 2023-09-11
Payer: MEDICAID

## 2023-09-11 VITALS
DIASTOLIC BLOOD PRESSURE: 88 MMHG | HEART RATE: 89 BPM | OXYGEN SATURATION: 97 % | TEMPERATURE: 97.1 F | WEIGHT: 135 LBS | BODY MASS INDEX: 23.92 KG/M2 | RESPIRATION RATE: 15 BRPM | SYSTOLIC BLOOD PRESSURE: 138 MMHG | HEIGHT: 63 IN

## 2023-09-11 DIAGNOSIS — Z12.11 SCREEN FOR COLON CANCER: ICD-10-CM

## 2023-09-11 DIAGNOSIS — M16.11 PRIMARY OSTEOARTHRITIS OF RIGHT HIP: ICD-10-CM

## 2023-09-11 DIAGNOSIS — E78.00 PURE HYPERCHOLESTEROLEMIA: ICD-10-CM

## 2023-09-11 DIAGNOSIS — I10 ESSENTIAL HYPERTENSION: Primary | ICD-10-CM

## 2023-09-11 DIAGNOSIS — F41.8 DEPRESSION WITH ANXIETY: ICD-10-CM

## 2023-09-11 PROCEDURE — 3079F DIAST BP 80-89 MM HG: CPT | Performed by: NURSE PRACTITIONER

## 2023-09-11 PROCEDURE — 3075F SYST BP GE 130 - 139MM HG: CPT | Performed by: NURSE PRACTITIONER

## 2023-09-11 PROCEDURE — 1160F RVW MEDS BY RX/DR IN RCRD: CPT | Performed by: NURSE PRACTITIONER

## 2023-09-11 PROCEDURE — 1159F MED LIST DOCD IN RCRD: CPT | Performed by: NURSE PRACTITIONER

## 2023-09-11 PROCEDURE — 99214 OFFICE O/P EST MOD 30 MIN: CPT | Performed by: NURSE PRACTITIONER

## 2023-09-11 RX ORDER — MELOXICAM 15 MG/1
TABLET ORAL
Qty: 30 TABLET | Refills: 2 | Status: SHIPPED | OUTPATIENT
Start: 2023-09-11

## 2023-09-11 NOTE — PROGRESS NOTES
Subjective   Chief Complaint   Patient presents with    Hypertension      Jamaica Shell is a 57 y.o. female.     The patient is here today for follow-up.    The patient is not doing well. Her blood pressure is elevated today. She denies any sore throat, headaches, or blurry vision.     She states her memory is getting better. The patient states her depression and anxiety are doing well. The patient states her body hurts intermittently. She takes ibuprofen occasionally.    She has had Ensure this morning.     I have reviewed the following portions of the patient's history and confirmed they are accurate: allergies, current medications, past family history, past medical history, past social history, past surgical history, and problem list    Review of Systems  Pertinent items are noted in HPI.     Current Outpatient Medications on File Prior to Visit   Medication Sig    albuterol sulfate HFA (Ventolin HFA) 108 (90 Base) MCG/ACT inhaler INHALE TWO PUFFS BY MOUTH EVERY 4 TO 6 HOURS AS NEEDED FOR COUGH ,  SHORTNESS OF BREATH , OR WHEEZING    atorvastatin (Lipitor) 80 MG tablet Take 1 tablet by mouth Daily.    bisoprolol (ZEBeta) 10 MG tablet Take 1 tablet by mouth Daily.    buPROPion XL (WELLBUTRIN XL) 150 MG 24 hr tablet TAKE ONE TABLET BY MOUTH DAILY    buPROPion XL (WELLBUTRIN XL) 300 MG 24 hr tablet Take 1 tablet by mouth Daily.    Diclofenac Sodium (VOLTAREN) 1 % gel gel Apply 1 gram topically to indicated area 4 times daily as needed for mild to moderate pain.    ferrous sulfate (FeroSul) 325 (65 FE) MG tablet Take 1 tablet by mouth Daily With Breakfast.    FLUoxetine (PROzac) 40 MG capsule TAKE ONE CAPSULE BY MOUTH DAILY    folic acid (FOLVITE) 1 MG tablet Take 1 tablet by mouth Daily.    lisinopril (PRINIVIL,ZESTRIL) 40 MG tablet TAKE ONE TABLET BY MOUTH DAILY    loratadine (CLARITIN) 10 MG tablet Take 1 tablet by mouth Daily.    methocarbamol (ROBAXIN) 750 MG tablet Take 1/2-1 tablet by mouth 4 times  "daily as needed for muscle spasms.    montelukast (SINGULAIR) 10 MG tablet Take 1 tablet by mouth Every Night.    potassium chloride ER (K-TAB) 20 MEQ tablet controlled-release ER tablet TAKE ONE TABLET BY MOUTH DAILY    spironolactone (Aldactone) 25 MG tablet Take 1 tablet by mouth Daily.    traZODone (DESYREL) 50 MG tablet TAKE 1 TO 2 TABLETS BY MOUTH 1 HOUR BEFORE BEDTIME AS NEEDED FOR SLEEP     No current facility-administered medications on file prior to visit.       Objective   Vitals:    09/11/23 1049 09/11/23 1140   BP: 172/96 138/88   Pulse: 89    Resp: 15    Temp: 97.1 °F (36.2 °C)    TempSrc: Tympanic    SpO2: 97%    Weight: 61.2 kg (135 lb)    Height: 160 cm (63\")      Body mass index is 23.91 kg/m².    Physical Exam    Assessment & Plan       Hypertension, chronic, stable.  Continue lisinopril, bisoprolol, and spironolactone.  Follow her healthy low cholesterol diet.    2. Hypercholesterolemia, chronic, unstable.  Continue atorvastatin.  Patient will wait until 3-month follow-up before getting labs done due to transportation issues and not fasting today.  She will follow a strict low cholesterol, low fat diet, and follow a high fiber diet.    3. Depression with anxiety, chronic, stable.  Continue Prozac, Wellbutrin, and trazodone as needed for sleep.    4. Osteoarthritis, chronic, unstable.  Start meloxicam as needed.  Educated patient to use this sparingly due to risk of decreasing her kidney function.  She will drink an adequate amount of water and eat food with this medication.  She will refrain from other NSAIDs including ibuprofen.  When taking this, she can take Tylenol as needed for pain.        Problem List Items Addressed This Visit          Cardiac and Vasculature    Essential hypertension - Primary    Pure hypercholesterolemia       Mental Health    Depression with anxiety     Other Visit Diagnoses       Primary osteoarthritis of right hip        Screen for colon cancer             "       Current Outpatient Medications:     albuterol sulfate HFA (Ventolin HFA) 108 (90 Base) MCG/ACT inhaler, INHALE TWO PUFFS BY MOUTH EVERY 4 TO 6 HOURS AS NEEDED FOR COUGH ,  SHORTNESS OF BREATH , OR WHEEZING, Disp: 18 g, Rfl: 0    atorvastatin (Lipitor) 80 MG tablet, Take 1 tablet by mouth Daily., Disp: 30 tablet, Rfl: 5    bisoprolol (ZEBeta) 10 MG tablet, Take 1 tablet by mouth Daily., Disp: 90 tablet, Rfl: 3    buPROPion XL (WELLBUTRIN XL) 150 MG 24 hr tablet, TAKE ONE TABLET BY MOUTH DAILY, Disp: 90 tablet, Rfl: 3    buPROPion XL (WELLBUTRIN XL) 300 MG 24 hr tablet, Take 1 tablet by mouth Daily., Disp: 30 tablet, Rfl: 2    Diclofenac Sodium (VOLTAREN) 1 % gel gel, Apply 1 gram topically to indicated area 4 times daily as needed for mild to moderate pain., Disp: 100 g, Rfl: 2    ferrous sulfate (FeroSul) 325 (65 FE) MG tablet, Take 1 tablet by mouth Daily With Breakfast., Disp: 30 tablet, Rfl: 5    FLUoxetine (PROzac) 40 MG capsule, TAKE ONE CAPSULE BY MOUTH DAILY, Disp: 30 capsule, Rfl: 2    folic acid (FOLVITE) 1 MG tablet, Take 1 tablet by mouth Daily., Disp: 90 tablet, Rfl: 3    lisinopril (PRINIVIL,ZESTRIL) 40 MG tablet, TAKE ONE TABLET BY MOUTH DAILY, Disp: 90 tablet, Rfl: 0    loratadine (CLARITIN) 10 MG tablet, Take 1 tablet by mouth Daily., Disp: 30 tablet, Rfl: 5    methocarbamol (ROBAXIN) 750 MG tablet, Take 1/2-1 tablet by mouth 4 times daily as needed for muscle spasms., Disp: 60 tablet, Rfl: 2    montelukast (SINGULAIR) 10 MG tablet, Take 1 tablet by mouth Every Night., Disp: 30 tablet, Rfl: 5    potassium chloride ER (K-TAB) 20 MEQ tablet controlled-release ER tablet, TAKE ONE TABLET BY MOUTH DAILY, Disp: 30 tablet, Rfl: 2    spironolactone (Aldactone) 25 MG tablet, Take 1 tablet by mouth Daily., Disp: 90 tablet, Rfl: 3    traZODone (DESYREL) 50 MG tablet, TAKE 1 TO 2 TABLETS BY MOUTH 1 HOUR BEFORE BEDTIME AS NEEDED FOR SLEEP, Disp: 45 tablet, Rfl: 2    meloxicam (MOBIC) 15 MG tablet, Take 1/2  to 1 tablet by mouth once daily as needed for moderate pain. Take with food, Disp: 30 tablet, Rfl: 2       Plan of care reviewed with the patient at the conclusion of today's visit.  Education was provided regarding diagnosis, management, and any prescribed or recommended OTC medications.  Patient verbalized understanding of and agreement with management plan.     Return in about 3 months (around 12/11/2023), or if symptoms worsen or fail to improve.      Transcribed from ambient dictation for MARTIN Ramirez by MARTIN Ramirez.  09/11/23   11:48 EDT    Patient or patient representative verbalized consent to the visit recording.  I have personally performed the services described in this document as transcribed by the above individual, and it is both accurate and complete.  Transcribed from ambient dictation for MARTIN Ramirez by Angelique Matthew.  09/11/23   12:25 EDT    Patient or patient representative verbalized consent to the visit recording.  I have personally performed the services described in this document as transcribed by the above individual, and it is both accurate and complete.

## 2023-10-18 DIAGNOSIS — M25.551 RIGHT HIP PAIN: ICD-10-CM

## 2023-10-19 DIAGNOSIS — I10 ESSENTIAL HYPERTENSION: ICD-10-CM

## 2023-10-19 RX ORDER — LISINOPRIL 40 MG/1
40 TABLET ORAL DAILY
Qty: 90 TABLET | Refills: 1 | Status: SHIPPED | OUTPATIENT
Start: 2023-10-19

## 2023-10-19 RX ORDER — POTASSIUM CHLORIDE 1500 MG/1
20 TABLET, EXTENDED RELEASE ORAL DAILY
Qty: 90 TABLET | Refills: 1 | Status: SHIPPED | OUTPATIENT
Start: 2023-10-19

## 2023-10-21 DIAGNOSIS — J30.89 ENVIRONMENTAL AND SEASONAL ALLERGIES: ICD-10-CM

## 2023-10-23 RX ORDER — FERROUS SULFATE 325(65) MG
1 TABLET ORAL
Qty: 30 TABLET | Refills: 5 | Status: SHIPPED | OUTPATIENT
Start: 2023-10-23

## 2023-10-23 RX ORDER — MONTELUKAST SODIUM 10 MG/1
10 TABLET ORAL NIGHTLY
Qty: 90 TABLET | Refills: 1 | Status: SHIPPED | OUTPATIENT
Start: 2023-10-23

## 2023-10-29 ENCOUNTER — READMISSION MANAGEMENT (OUTPATIENT)
Dept: CALL CENTER | Facility: HOSPITAL | Age: 58
End: 2023-10-29
Payer: MEDICAID

## 2023-10-29 NOTE — OUTREACH NOTE
Prep Survey      Flowsheet Row Responses   [facility] facility patient discharged from? Non-BH   Is LACE score < 7 ? Non- Discharge   Eligibility TCM [facility]   Date of Discharge 10/29/23   Discharge diagnosis Sepsis, unspecified organism   Does the patient have one of the following disease processes/diagnoses(primary or secondary)? Sepsis   Prep survey completed? Yes            MERVIN LEE - Registered Nurse

## 2023-10-30 ENCOUNTER — TRANSITIONAL CARE MANAGEMENT TELEPHONE ENCOUNTER (OUTPATIENT)
Dept: CALL CENTER | Facility: HOSPITAL | Age: 58
End: 2023-10-30
Payer: MEDICAID

## 2023-10-30 NOTE — OUTREACH NOTE
Call Center TCM Note      Flowsheet Row Responses   [facility] patient discharged from? Non-  [[facility] - Inpatient]   Does the patient have one of the following disease processes/diagnoses(primary or secondary)? Other   TCM attempt successful? Yes   Call start time 1422   Call end time 1429   Discharge diagnosis Sepsis   Person spoke with today (if not patient) and relationship Patient   Meds reviewed with patient/caregiver? Yes  [Patient reports that she is getting new meds picked up from pharmacy today. Will bring all discharge information to f/u appt friday.]   Does the patient have all medications ordered at discharge? No   What is keeping the patient from filling the prescriptions? --  [Picking up today]   Is the patient taking all medications as directed (includes completed medication regime)? No   What is preventing the patient from taking all medications as directed? Other  [see above.]   Comments PCP Candace SALAZAR. Hospital follow up appt in place for Friday 11/3  1pm. Patient would like earlier appt this week if possible.   Does the patient have an appointment with their PCP within 7-14 days of discharge? Yes   Has home health visited the patient within 72 hours of discharge? N/A   Psychosocial issues? No   Did the patient receive a copy of their discharge instructions? Yes   What is the patient's perception of their health status since discharge? Improving   Is the patient/caregiver able to teach back signs and symptoms related to disease process for when to call PCP? Yes   Is the patient/caregiver able to teach back signs and symptoms related to disease process for when to call 911? Yes   Is the patient/caregiver able to teach back the hierarchy of who to call/visit for symptoms/problems? PCP, Specialist, Home health nurse, Urgent Care, ED, 911 Yes   TCM call completed? Yes   Call end time 1429   Would this patient benefit from a Referral to [facility] Social Work? No   Is  the patient interested in additional calls from an ambulatory ? No            Carolina Moore RN    10/30/2023, 14:31 EDT

## 2023-11-03 ENCOUNTER — LAB (OUTPATIENT)
Dept: INTERNAL MEDICINE | Facility: CLINIC | Age: 58
End: 2023-11-03
Payer: MEDICAID

## 2023-11-03 ENCOUNTER — OFFICE VISIT (OUTPATIENT)
Dept: INTERNAL MEDICINE | Facility: CLINIC | Age: 58
End: 2023-11-03
Payer: MEDICAID

## 2023-11-03 VITALS
RESPIRATION RATE: 16 BRPM | DIASTOLIC BLOOD PRESSURE: 80 MMHG | TEMPERATURE: 97.8 F | WEIGHT: 130 LBS | BODY MASS INDEX: 23.04 KG/M2 | HEART RATE: 68 BPM | HEIGHT: 63 IN | SYSTOLIC BLOOD PRESSURE: 124 MMHG | OXYGEN SATURATION: 98 %

## 2023-11-03 DIAGNOSIS — F41.8 DEPRESSION WITH ANXIETY: ICD-10-CM

## 2023-11-03 DIAGNOSIS — F17.210 CIGARETTE SMOKER: ICD-10-CM

## 2023-11-03 DIAGNOSIS — N28.9 RENAL LESION: ICD-10-CM

## 2023-11-03 DIAGNOSIS — J96.02 ACUTE RESPIRATORY FAILURE WITH HYPERCAPNIA: ICD-10-CM

## 2023-11-03 DIAGNOSIS — F10.21 ALCOHOL DEPENDENCE IN REMISSION: ICD-10-CM

## 2023-11-03 DIAGNOSIS — I50.21 ACUTE SYSTOLIC CHF (CONGESTIVE HEART FAILURE): Primary | ICD-10-CM

## 2023-11-03 DIAGNOSIS — I10 ESSENTIAL HYPERTENSION: Primary | ICD-10-CM

## 2023-11-03 DIAGNOSIS — Z12.31 ENCOUNTER FOR SCREENING MAMMOGRAM FOR MALIGNANT NEOPLASM OF BREAST: ICD-10-CM

## 2023-11-03 LAB
ALBUMIN SERPL-MCNC: 4 G/DL (ref 3.5–5.2)
ALBUMIN/GLOB SERPL: 1.3 G/DL
ALP SERPL-CCNC: 130 U/L (ref 39–117)
ALT SERPL W P-5'-P-CCNC: 31 U/L (ref 1–33)
ANION GAP SERPL CALCULATED.3IONS-SCNC: 15.2 MMOL/L (ref 5–15)
AST SERPL-CCNC: 22 U/L (ref 1–32)
BASOPHILS # BLD AUTO: 0.03 10*3/MM3 (ref 0–0.2)
BASOPHILS NFR BLD AUTO: 0.5 % (ref 0–1.5)
BILIRUB SERPL-MCNC: 0.2 MG/DL (ref 0–1.2)
BUN SERPL-MCNC: 6 MG/DL (ref 6–20)
BUN/CREAT SERPL: 6.8 (ref 7–25)
CALCIUM SPEC-SCNC: 9.8 MG/DL (ref 8.6–10.5)
CHLORIDE SERPL-SCNC: 105 MMOL/L (ref 98–107)
CO2 SERPL-SCNC: 22.8 MMOL/L (ref 22–29)
CREAT SERPL-MCNC: 0.88 MG/DL (ref 0.57–1)
DEPRECATED RDW RBC AUTO: 38.1 FL (ref 37–54)
EGFRCR SERPLBLD CKD-EPI 2021: 77.7 ML/MIN/1.73
EOSINOPHIL # BLD AUTO: 0.04 10*3/MM3 (ref 0–0.4)
EOSINOPHIL NFR BLD AUTO: 0.6 % (ref 0.3–6.2)
ERYTHROCYTE [DISTWIDTH] IN BLOOD BY AUTOMATED COUNT: 13.2 % (ref 12.3–15.4)
GLOBULIN UR ELPH-MCNC: 3 GM/DL
GLUCOSE SERPL-MCNC: 76 MG/DL (ref 65–99)
HCT VFR BLD AUTO: 43.4 % (ref 34–46.6)
HGB BLD-MCNC: 14 G/DL (ref 12–15.9)
IMM GRANULOCYTES # BLD AUTO: 0.02 10*3/MM3 (ref 0–0.05)
IMM GRANULOCYTES NFR BLD AUTO: 0.3 % (ref 0–0.5)
LYMPHOCYTES # BLD AUTO: 1.66 10*3/MM3 (ref 0.7–3.1)
LYMPHOCYTES NFR BLD AUTO: 25.4 % (ref 19.6–45.3)
MCH RBC QN AUTO: 26.1 PG (ref 26.6–33)
MCHC RBC AUTO-ENTMCNC: 32.3 G/DL (ref 31.5–35.7)
MCV RBC AUTO: 80.8 FL (ref 79–97)
MONOCYTES # BLD AUTO: 0.47 10*3/MM3 (ref 0.1–0.9)
MONOCYTES NFR BLD AUTO: 7.2 % (ref 5–12)
NEUTROPHILS NFR BLD AUTO: 4.32 10*3/MM3 (ref 1.7–7)
NEUTROPHILS NFR BLD AUTO: 66 % (ref 42.7–76)
NRBC BLD AUTO-RTO: 0 /100 WBC (ref 0–0.2)
NT-PROBNP SERPL-MCNC: 2804 PG/ML (ref 0–900)
PLATELET # BLD AUTO: 543 10*3/MM3 (ref 140–450)
PMV BLD AUTO: 10.2 FL (ref 6–12)
POTASSIUM SERPL-SCNC: 4.4 MMOL/L (ref 3.5–5.2)
PROT SERPL-MCNC: 7 G/DL (ref 6–8.5)
RBC # BLD AUTO: 5.37 10*6/MM3 (ref 3.77–5.28)
SODIUM SERPL-SCNC: 143 MMOL/L (ref 136–145)
WBC NRBC COR # BLD: 6.54 10*3/MM3 (ref 3.4–10.8)

## 2023-11-03 PROCEDURE — 83880 ASSAY OF NATRIURETIC PEPTIDE: CPT | Performed by: NURSE PRACTITIONER

## 2023-11-03 PROCEDURE — 36415 COLL VENOUS BLD VENIPUNCTURE: CPT | Performed by: NURSE PRACTITIONER

## 2023-11-03 PROCEDURE — 85025 COMPLETE CBC W/AUTO DIFF WBC: CPT | Performed by: NURSE PRACTITIONER

## 2023-11-03 PROCEDURE — 80053 COMPREHEN METABOLIC PANEL: CPT | Performed by: NURSE PRACTITIONER

## 2023-11-03 RX ORDER — DIPHENOXYLATE HYDROCHLORIDE AND ATROPINE SULFATE 2.5; .025 MG/1; MG/1
1 TABLET ORAL DAILY
COMMUNITY
Start: 2023-10-29 | End: 2023-11-28

## 2023-11-03 RX ORDER — NICOTINE 21 MG/24HR
1 PATCH, TRANSDERMAL 24 HOURS TRANSDERMAL EVERY 24 HOURS
Qty: 28 EACH | Refills: 1 | Status: SHIPPED | OUTPATIENT
Start: 2023-11-03

## 2023-11-03 RX ORDER — BUDESONIDE 0.5 MG/2ML
0.5 INHALANT ORAL
COMMUNITY
Start: 2023-10-28 | End: 2023-11-03

## 2023-11-03 RX ORDER — FUROSEMIDE 40 MG/1
40 TABLET ORAL
COMMUNITY
Start: 2023-10-28 | End: 2023-11-27

## 2023-11-03 RX ORDER — NALTREXONE HYDROCHLORIDE 50 MG/1
50 TABLET, FILM COATED ORAL DAILY
Qty: 30 TABLET | Refills: 5 | Status: SHIPPED | OUTPATIENT
Start: 2023-11-03

## 2023-11-03 RX ORDER — CEFDINIR 300 MG/1
300 CAPSULE ORAL
COMMUNITY
Start: 2023-10-28 | End: 2023-11-04

## 2023-11-03 RX ORDER — FLUOXETINE HYDROCHLORIDE 20 MG/1
60 CAPSULE ORAL DAILY
Qty: 90 CAPSULE | Refills: 5 | Status: SHIPPED | OUTPATIENT
Start: 2023-11-03

## 2023-11-03 NOTE — PROGRESS NOTES
Subjective   Chief Complaint   Patient presents with    Hospital Follow Up Visit     Transitional Care Follow Up Visit  Subjective     Jamaica Shell is a 57 y.o. female who presents for a transitional care management visit.    Within 48 business hours after discharge our office contacted her via telephone to coordinate her care and needs.      I reviewed and discussed the details of that call along with the discharge summary, hospital problems, inpatient lab results, inpatient diagnostic studies, and consultation reports with [place].     Current outpatient and discharge medications have been reconciled for the patient.  Reviewed by: MARTIN Murray          10/29/2023     5:29 PM   Date of TCM Phone Call   Hospital [facility] - Inpatient   Date of Discharge 10/29/2023     Risk for Readmission (LACE) No data recorded     Jamaica Shell is a 57 y.o. female.     The patient is here today for a hospital follow-up. The patient is TCM visit today. She was admitted for sepsis. The diagnosis was acute metabolic encephalopathy secondary to sepsis and respiratory failure. She had congestive heart failure exacerbation and COPD exacerbation. She was diagnosed with bacterial pneumonia. She says that there was an incidental renal lesion found on a CT scan. She needs a follow-up renal mass CT by PCP.    The patient reports being treated badly in the emergency room. She has started drinking alcohol again. She denies trying naltrexone in the past. She gets tired fast when she walks and has to sit down when she is at home cooking. She denies eating breakfast or coffee. She reports that once she comes back from work, she drinks a shot of vodka. She has more energy in the last couple of days than she has had in a long time. She has a follow-up appointment in [place] on 11/08/2023. She ignored dyspnea 1 day before this happened. She works in a nursing facility. She denies taking her fluid  pill. She is taking potassium every day.    The patient has been a smoker since she was in her early 32-year-old.    The patient was taking Wellbutrin 150 mg and taking it, because she thought she was getting better. She takes Prozac 40 mg.    I have reviewed the following portions of the patient's history and confirmed they are accurate: allergies, current medications, past family history, past medical history, past social history, past surgical history, and problem list    Review of Systems  Pertinent items are noted in HPI.     Current Outpatient Medications on File Prior to Visit   Medication Sig    albuterol sulfate HFA (Ventolin HFA) 108 (90 Base) MCG/ACT inhaler INHALE TWO PUFFS BY MOUTH EVERY 4 TO 6 HOURS AS NEEDED FOR COUGH ,  SHORTNESS OF BREATH , OR WHEEZING    atorvastatin (Lipitor) 80 MG tablet Take 1 tablet by mouth Daily.    bisoprolol (ZEBeta) 10 MG tablet Take 1 tablet by mouth Daily.    buPROPion XL (WELLBUTRIN XL) 300 MG 24 hr tablet Take 1 tablet by mouth Daily.    cefdinir (OMNICEF) 300 MG capsule Take 1 capsule by mouth.    Diclofenac Sodium (VOLTAREN) 1 % gel gel APPLY ONE GRAM TOPICALLY TO INDICATED AREA FOUR TIMES A DAY AS NEEDED FOR MILD TO MODERATE PAIN    FeroSul 325 (65 Fe) MG tablet TAKE ONE TABLET BY MOUTH DAILY WITH BREAKFAST    folic acid (FOLVITE) 1 MG tablet Take 1 tablet by mouth Daily.    furosemide (LASIX) 40 MG tablet Take 1 tablet by mouth.    lisinopril (PRINIVIL,ZESTRIL) 40 MG tablet TAKE 1 TABLET BY MOUTH DAILY    loratadine (CLARITIN) 10 MG tablet Take 1 tablet by mouth Daily.    meloxicam (MOBIC) 15 MG tablet Take 1/2 to 1 tablet by mouth once daily as needed for moderate pain. Take with food    methocarbamol (ROBAXIN) 750 MG tablet Take 1/2-1 tablet by mouth 4 times daily as needed for muscle spasms.    montelukast (SINGULAIR) 10 MG tablet TAKE ONE TABLET BY MOUTH ONCE NIGHTLY    multivitamin (THERAGRAN) tablet tablet Take 1 tablet by mouth Daily.    potassium chloride  "ER (K-TAB) 20 MEQ tablet controlled-release ER tablet TAKE 1 TABLET BY MOUTH DAILY    sacubitril-valsartan (ENTRESTO) 24-26 MG tablet Take 1 tablet by mouth.    spironolactone (Aldactone) 25 MG tablet Take 1 tablet by mouth Daily.    thiamine (VITAMIN B1) 50 MG tablet Take 2 tablets by mouth Daily.    traZODone (DESYREL) 50 MG tablet TAKE 1 TO 2 TABLETS BY MOUTH 1 HOUR BEFORE BEDTIME AS NEEDED FOR SLEEP    [DISCONTINUED] budesonide (PULMICORT) 0.5 MG/2ML nebulizer solution Inhale 2 mL.    [DISCONTINUED] buPROPion XL (WELLBUTRIN XL) 150 MG 24 hr tablet TAKE ONE TABLET BY MOUTH DAILY    [DISCONTINUED] FLUoxetine (PROzac) 40 MG capsule TAKE ONE CAPSULE BY MOUTH DAILY     No current facility-administered medications on file prior to visit.       Objective   Vitals:    11/03/23 1310   BP: 124/80   Pulse: 68   Resp: 16   Temp: 97.8 °F (36.6 °C)   TempSrc: Tympanic   SpO2: 98%   Weight: 59 kg (130 lb)   Height: 160 cm (63\")     Body mass index is 23.03 kg/m².    Physical Exam  Vitals and nursing note reviewed.   Constitutional:       Appearance: Normal appearance. She is well-developed.   HENT:      Head: Normocephalic and atraumatic.      Nose: Nose normal.   Eyes:      General: Lids are normal.      Conjunctiva/sclera: Conjunctivae normal.      Pupils: Pupils are equal, round, and reactive to light.   Neck:      Thyroid: No thyromegaly.      Trachea: Trachea normal.   Cardiovascular:      Rate and Rhythm: Normal rate and regular rhythm.      Heart sounds: Normal heart sounds.   Pulmonary:      Effort: Pulmonary effort is normal. No respiratory distress.      Breath sounds: Normal breath sounds.   Abdominal:      General: Bowel sounds are normal.      Palpations: Abdomen is soft.   Musculoskeletal:      Cervical back: Normal range of motion and neck supple.   Skin:     General: Skin is warm and dry.   Neurological:      Mental Status: She is alert and oriented to person, place, and time.      GCS: GCS eye subscore is 4. " GCS verbal subscore is 5. GCS motor subscore is 6.   Psychiatric:         Attention and Perception: Attention normal.         Mood and Affect: Mood normal.         Speech: Speech normal.         Behavior: Behavior normal. Behavior is cooperative.         Thought Content: Thought content normal.         Judgment: Judgment normal.         Assessment & Plan   Problem List Items Addressed This Visit       Depression with anxiety    Relevant Medications    FLUoxetine (PROzac) 20 MG capsule    nicotine (Nicoderm CQ) 21 MG/24HR patch     Other Visit Diagnoses       Acute systolic CHF (congestive heart failure)    -  Primary    Relevant Medications    sacubitril-valsartan (ENTRESTO) 24-26 MG tablet    Other Relevant Orders    proBNP    Comprehensive Metabolic Panel    CBC Auto Differential    Acute respiratory failure with hypercapnia        Renal lesion        Relevant Medications    furosemide (LASIX) 40 MG tablet    Cigarette smoker        Alcohol dependence in remission        Relevant Medications    naltrexone (DEPADE) 50 MG tablet    Encounter for screening mammogram for malignant neoplasm of breast        Relevant Orders    Mammo Screening Digital Tomosynthesis Bilateral With CAD               Current Outpatient Medications:     albuterol sulfate HFA (Ventolin HFA) 108 (90 Base) MCG/ACT inhaler, INHALE TWO PUFFS BY MOUTH EVERY 4 TO 6 HOURS AS NEEDED FOR COUGH ,  SHORTNESS OF BREATH , OR WHEEZING, Disp: 18 g, Rfl: 0    atorvastatin (Lipitor) 80 MG tablet, Take 1 tablet by mouth Daily., Disp: 30 tablet, Rfl: 5    bisoprolol (ZEBeta) 10 MG tablet, Take 1 tablet by mouth Daily., Disp: 90 tablet, Rfl: 3    buPROPion XL (WELLBUTRIN XL) 300 MG 24 hr tablet, Take 1 tablet by mouth Daily., Disp: 30 tablet, Rfl: 2    cefdinir (OMNICEF) 300 MG capsule, Take 1 capsule by mouth., Disp: , Rfl:     Diclofenac Sodium (VOLTAREN) 1 % gel gel, APPLY ONE GRAM TOPICALLY TO INDICATED AREA FOUR TIMES A DAY AS NEEDED FOR MILD TO MODERATE  PAIN, Disp: 100 g, Rfl: 2    FeroSul 325 (65 Fe) MG tablet, TAKE ONE TABLET BY MOUTH DAILY WITH BREAKFAST, Disp: 30 tablet, Rfl: 5    folic acid (FOLVITE) 1 MG tablet, Take 1 tablet by mouth Daily., Disp: 90 tablet, Rfl: 3    furosemide (LASIX) 40 MG tablet, Take 1 tablet by mouth., Disp: , Rfl:     lisinopril (PRINIVIL,ZESTRIL) 40 MG tablet, TAKE 1 TABLET BY MOUTH DAILY, Disp: 90 tablet, Rfl: 1    loratadine (CLARITIN) 10 MG tablet, Take 1 tablet by mouth Daily., Disp: 30 tablet, Rfl: 5    meloxicam (MOBIC) 15 MG tablet, Take 1/2 to 1 tablet by mouth once daily as needed for moderate pain. Take with food, Disp: 30 tablet, Rfl: 2    methocarbamol (ROBAXIN) 750 MG tablet, Take 1/2-1 tablet by mouth 4 times daily as needed for muscle spasms., Disp: 60 tablet, Rfl: 2    montelukast (SINGULAIR) 10 MG tablet, TAKE ONE TABLET BY MOUTH ONCE NIGHTLY, Disp: 90 tablet, Rfl: 1    multivitamin (THERAGRAN) tablet tablet, Take 1 tablet by mouth Daily., Disp: , Rfl:     potassium chloride ER (K-TAB) 20 MEQ tablet controlled-release ER tablet, TAKE 1 TABLET BY MOUTH DAILY, Disp: 90 tablet, Rfl: 1    sacubitril-valsartan (ENTRESTO) 24-26 MG tablet, Take 1 tablet by mouth., Disp: , Rfl:     spironolactone (Aldactone) 25 MG tablet, Take 1 tablet by mouth Daily., Disp: 90 tablet, Rfl: 3    thiamine (VITAMIN B1) 50 MG tablet, Take 2 tablets by mouth Daily., Disp: , Rfl:     traZODone (DESYREL) 50 MG tablet, TAKE 1 TO 2 TABLETS BY MOUTH 1 HOUR BEFORE BEDTIME AS NEEDED FOR SLEEP, Disp: 45 tablet, Rfl: 2    FLUoxetine (PROzac) 20 MG capsule, Take 3 capsules by mouth Daily., Disp: 90 capsule, Rfl: 5    naltrexone (DEPADE) 50 MG tablet, Take 1 tablet by mouth Daily., Disp: 30 tablet, Rfl: 5    nicotine (Nicoderm CQ) 21 MG/24HR patch, Place 1 patch on the skin as directed by provider Daily., Disp: 28 each, Rfl: 1     1. Acute systolic congestive heart failure  - New, unstable.  - Referring to cardiology.  - Patient will start Entresto.  - She  will start Lasix as needed for swelling and shortness of breath.  - She will continue the potassium daily.  - She will continue bisoprolol, lisinopril, and spironolactone.    2. Acute respiratory failure  - New, unstable.  - Start albuterol inhaler as needed.  - We will give patient some time for inflammation and symptoms to improve.  - We will order pulmonary function test and CT scan of chest and refer to pulmonology as needed.    3. Renal lesion  - New, unstable.  - Ordering CT scan with renal protocol.    4. Depression with anxiety  - Chronic, unstable.  - Increase Prozac.  - Continue Wellbutrin.  - Continue trazodone as needed for sleep.    5. Cigarette smoker  - New, unstable.  - Continue Wellbutrin.  - Start nicotine patches.    6. Alcohol dependence  - Chronic, unstable.  - Start naltrexone.  - Declines behavioral health referral at this time.      Plan of care reviewed with the patient at the conclusion of today's visit.  Education was provided regarding diagnosis, management, and any prescribed or recommended OTC medications.  Patient verbalized understanding of and agreement with management plan.     Return in about 1 month (around 12/3/2023), or if symptoms worsen or fail to improve, for Follow-up.      Transcribed from ambient dictation for MARTIN Ramirez by MARTIN Ramirez.  11/03/23   13:57 EDT    Patient or patient representative verbalized consent to the visit recording.  I have personally performed the services described in this document as transcribed by the above individual, and it is both accurate and complete.  Transcribed from ambient dictation for MARTIN Ramirez by Ashlee Ritchie.  11/03/23   14:53 EDT    Patient or patient representative verbalized consent to the visit recording.  I have personally performed the services described in this document as transcribed by the above individual, and it is both accurate and complete.

## 2023-11-19 ENCOUNTER — TELEPHONE (OUTPATIENT)
Dept: INTERNAL MEDICINE | Facility: CLINIC | Age: 58
End: 2023-11-19
Payer: MEDICAID

## 2023-11-20 NOTE — TELEPHONE ENCOUNTER
Please contact patient and advise:     Your heart failure lab has went up but I expect this to go back down after starting the medications. I want to recheck it during your appt on 12/4. You will be getting call with appt from cardiology soon.

## 2023-12-02 DIAGNOSIS — F41.8 DEPRESSION WITH ANXIETY: ICD-10-CM

## 2023-12-04 ENCOUNTER — LAB (OUTPATIENT)
Dept: INTERNAL MEDICINE | Facility: CLINIC | Age: 58
End: 2023-12-04
Payer: MEDICAID

## 2023-12-04 ENCOUNTER — OFFICE VISIT (OUTPATIENT)
Dept: INTERNAL MEDICINE | Facility: CLINIC | Age: 58
End: 2023-12-04
Payer: MEDICAID

## 2023-12-04 VITALS
HEIGHT: 63 IN | OXYGEN SATURATION: 96 % | DIASTOLIC BLOOD PRESSURE: 84 MMHG | SYSTOLIC BLOOD PRESSURE: 132 MMHG | WEIGHT: 131.2 LBS | RESPIRATION RATE: 16 BRPM | BODY MASS INDEX: 23.25 KG/M2 | HEART RATE: 70 BPM

## 2023-12-04 DIAGNOSIS — I10 ESSENTIAL HYPERTENSION: ICD-10-CM

## 2023-12-04 DIAGNOSIS — D75.839 THROMBOCYTOSIS: ICD-10-CM

## 2023-12-04 DIAGNOSIS — I50.21 ACUTE SYSTOLIC CHF (CONGESTIVE HEART FAILURE): Primary | ICD-10-CM

## 2023-12-04 DIAGNOSIS — I10 ESSENTIAL HYPERTENSION: Primary | ICD-10-CM

## 2023-12-04 DIAGNOSIS — M25.512 ACUTE PAIN OF LEFT SHOULDER: ICD-10-CM

## 2023-12-04 LAB
ALBUMIN SERPL-MCNC: 3.8 G/DL (ref 3.5–5.2)
ALBUMIN/GLOB SERPL: 1.5 G/DL
ALP SERPL-CCNC: 81 U/L (ref 39–117)
ALT SERPL W P-5'-P-CCNC: 20 U/L (ref 1–33)
ANION GAP SERPL CALCULATED.3IONS-SCNC: 12.1 MMOL/L (ref 5–15)
AST SERPL-CCNC: 21 U/L (ref 1–32)
BASOPHILS # BLD AUTO: 0.01 10*3/MM3 (ref 0–0.2)
BASOPHILS NFR BLD AUTO: 0.1 % (ref 0–1.5)
BILIRUB SERPL-MCNC: <0.2 MG/DL (ref 0–1.2)
BUN SERPL-MCNC: 11 MG/DL (ref 6–20)
BUN/CREAT SERPL: 13.4 (ref 7–25)
CALCIUM SPEC-SCNC: 9.2 MG/DL (ref 8.6–10.5)
CHLORIDE SERPL-SCNC: 109 MMOL/L (ref 98–107)
CO2 SERPL-SCNC: 21.9 MMOL/L (ref 22–29)
CREAT SERPL-MCNC: 0.82 MG/DL (ref 0.57–1)
DEPRECATED RDW RBC AUTO: 37.4 FL (ref 37–54)
EGFRCR SERPLBLD CKD-EPI 2021: 84.1 ML/MIN/1.73
EOSINOPHIL # BLD AUTO: 0.02 10*3/MM3 (ref 0–0.4)
EOSINOPHIL NFR BLD AUTO: 0.3 % (ref 0.3–6.2)
ERYTHROCYTE [DISTWIDTH] IN BLOOD BY AUTOMATED COUNT: 13.2 % (ref 12.3–15.4)
GLOBULIN UR ELPH-MCNC: 2.5 GM/DL
GLUCOSE SERPL-MCNC: 90 MG/DL (ref 65–99)
HCT VFR BLD AUTO: 40.5 % (ref 34–46.6)
HGB BLD-MCNC: 12.5 G/DL (ref 12–15.9)
IMM GRANULOCYTES # BLD AUTO: 0.02 10*3/MM3 (ref 0–0.05)
IMM GRANULOCYTES NFR BLD AUTO: 0.3 % (ref 0–0.5)
LYMPHOCYTES # BLD AUTO: 1.72 10*3/MM3 (ref 0.7–3.1)
LYMPHOCYTES NFR BLD AUTO: 24.4 % (ref 19.6–45.3)
MCH RBC QN AUTO: 24.2 PG (ref 26.6–33)
MCHC RBC AUTO-ENTMCNC: 30.9 G/DL (ref 31.5–35.7)
MCV RBC AUTO: 78.5 FL (ref 79–97)
MONOCYTES # BLD AUTO: 0.39 10*3/MM3 (ref 0.1–0.9)
MONOCYTES NFR BLD AUTO: 5.5 % (ref 5–12)
NEUTROPHILS NFR BLD AUTO: 4.88 10*3/MM3 (ref 1.7–7)
NEUTROPHILS NFR BLD AUTO: 69.4 % (ref 42.7–76)
NRBC BLD AUTO-RTO: 0 /100 WBC (ref 0–0.2)
NT-PROBNP SERPL-MCNC: 2261 PG/ML (ref 0–900)
PLATELET # BLD AUTO: 385 10*3/MM3 (ref 140–450)
PMV BLD AUTO: 10.3 FL (ref 6–12)
POTASSIUM SERPL-SCNC: 4.5 MMOL/L (ref 3.5–5.2)
PROT SERPL-MCNC: 6.3 G/DL (ref 6–8.5)
RBC # BLD AUTO: 5.16 10*6/MM3 (ref 3.77–5.28)
SODIUM SERPL-SCNC: 143 MMOL/L (ref 136–145)
WBC NRBC COR # BLD AUTO: 7.04 10*3/MM3 (ref 3.4–10.8)

## 2023-12-04 PROCEDURE — 36415 COLL VENOUS BLD VENIPUNCTURE: CPT | Performed by: NURSE PRACTITIONER

## 2023-12-04 PROCEDURE — 80053 COMPREHEN METABOLIC PANEL: CPT | Performed by: NURSE PRACTITIONER

## 2023-12-04 PROCEDURE — 83880 ASSAY OF NATRIURETIC PEPTIDE: CPT | Performed by: NURSE PRACTITIONER

## 2023-12-04 PROCEDURE — 85025 COMPLETE CBC W/AUTO DIFF WBC: CPT | Performed by: NURSE PRACTITIONER

## 2023-12-04 RX ORDER — FLUOXETINE HYDROCHLORIDE 40 MG/1
40 CAPSULE ORAL DAILY
Qty: 90 CAPSULE | OUTPATIENT
Start: 2023-12-04

## 2023-12-04 NOTE — PROGRESS NOTES
Subjective   Chief Complaint   Patient presents with    Acute systolic CHF (congestive heart failure)     1 month follow up      Jamaica Shell is a 58 y.o. female.     The patient is here today for a follow-up on heart failure. Her CBC also showed elevated platelets.    The patient has not started meds for heart failure. There was some confusion on meds when she was discharged from hospital. She is intermittently dyspneic. She occasionally has indentation when she takes off her socks but denies overall swelling or bloating. She is prescribed Entresto daily and Lasix as needed for swelling. Neither one of these medications are in her medication list and she is taking lisinopril instead of the Entresto. Taking taking spironolactone daily, but not Lasix as needed. Discussed with patient that these medications were supposed to be started upon discharge from hospital. She has an appointment coming up with her cardiologist, Dr. Keith. She has quit drinking alcohol. She is taking naltrexone 50 mg.    The patient has been experiencing pain in her left shoulder for approximately 2 months. She cannot sleep on her left side as well as having pain during the day. It is painful when she tries to reach for something.    I have reviewed the following portions of the patient's history and confirmed they are accurate: allergies, current medications, past family history, past medical history, past social history, past surgical history, and problem list    Review of Systems  Pertinent items are noted in HPI.     Current Outpatient Medications on File Prior to Visit   Medication Sig    albuterol sulfate HFA (Ventolin HFA) 108 (90 Base) MCG/ACT inhaler INHALE TWO PUFFS BY MOUTH EVERY 4 TO 6 HOURS AS NEEDED FOR COUGH ,  SHORTNESS OF BREATH , OR WHEEZING    atorvastatin (Lipitor) 80 MG tablet Take 1 tablet by mouth Daily.    bisoprolol (ZEBeta) 10 MG tablet Take 1 tablet by mouth Daily.    buPROPion XL (WELLBUTRIN XL) 300 MG 24 hr  "tablet Take 1 tablet by mouth Daily.    Diclofenac Sodium (VOLTAREN) 1 % gel gel APPLY ONE GRAM TOPICALLY TO INDICATED AREA FOUR TIMES A DAY AS NEEDED FOR MILD TO MODERATE PAIN    FeroSul 325 (65 Fe) MG tablet TAKE ONE TABLET BY MOUTH DAILY WITH BREAKFAST    FLUoxetine (PROzac) 20 MG capsule Take 3 capsules by mouth Daily.    folic acid (FOLVITE) 1 MG tablet Take 1 tablet by mouth Daily.    lisinopril (PRINIVIL,ZESTRIL) 40 MG tablet TAKE 1 TABLET BY MOUTH DAILY    loratadine (CLARITIN) 10 MG tablet Take 1 tablet by mouth Daily.    meloxicam (MOBIC) 15 MG tablet Take 1/2 to 1 tablet by mouth once daily as needed for moderate pain. Take with food    methocarbamol (ROBAXIN) 750 MG tablet Take 1/2-1 tablet by mouth 4 times daily as needed for muscle spasms.    montelukast (SINGULAIR) 10 MG tablet TAKE ONE TABLET BY MOUTH ONCE NIGHTLY    naltrexone (DEPADE) 50 MG tablet Take 1 tablet by mouth Daily.    nicotine (Nicoderm CQ) 21 MG/24HR patch Place 1 patch on the skin as directed by provider Daily.    potassium chloride ER (K-TAB) 20 MEQ tablet controlled-release ER tablet TAKE 1 TABLET BY MOUTH DAILY    spironolactone (Aldactone) 25 MG tablet Take 1 tablet by mouth Daily.    traZODone (DESYREL) 50 MG tablet TAKE 1 TO 2 TABLETS BY MOUTH 1 HOUR BEFORE BEDTIME AS NEEDED FOR SLEEP     No current facility-administered medications on file prior to visit.       Objective   Vitals:    12/04/23 0808   BP: 132/84   BP Location: Left arm   Pulse: 70   Resp: 16   SpO2: 96%   Weight: 59.5 kg (131 lb 3.2 oz)   Height: 160 cm (63\")     Body mass index is 23.24 kg/m².    Physical Exam  Vitals reviewed.   Constitutional:       Appearance: Normal appearance. She is well-developed.   HENT:      Head: Normocephalic and atraumatic.      Nose: Nose normal.   Eyes:      General: Lids are normal.      Conjunctiva/sclera: Conjunctivae normal.      Pupils: Pupils are equal, round, and reactive to light.   Neck:      Thyroid: No thyromegaly.      " Trachea: Trachea normal.   Cardiovascular:      Rate and Rhythm: Normal rate and regular rhythm.      Heart sounds: Normal heart sounds.   Pulmonary:      Effort: Pulmonary effort is normal. No respiratory distress.      Breath sounds: Normal breath sounds.   Musculoskeletal:      Left shoulder: Tenderness present.      Right lower leg: No edema.      Left lower leg: No edema.   Skin:     General: Skin is warm and dry.   Neurological:      Mental Status: She is alert and oriented to person, place, and time.      GCS: GCS eye subscore is 4. GCS verbal subscore is 5. GCS motor subscore is 6.   Psychiatric:         Attention and Perception: Attention normal.         Mood and Affect: Mood normal.         Speech: Speech normal.         Behavior: Behavior normal. Behavior is cooperative.         Thought Content: Thought content normal.       RESULTS:  Blood work results were reviewed from 11/03/2023. CBC showed elevated platelets. BNP elevated. Normal potassium.     Assessment & Plan   Problem List Items Addressed This Visit       Essential hypertension    Relevant Orders    CBC Auto Differential     Other Visit Diagnoses       Acute systolic CHF (congestive heart failure)    -  Primary    Relevant Orders    proBNP    Comprehensive Metabolic Panel    CBC Auto Differential    Acute pain of left shoulder        Relevant Orders    XR Shoulder 2+ View Left    Thrombocytosis        Relevant Orders    CBC Auto Differential           Current Outpatient Medications:     albuterol sulfate HFA (Ventolin HFA) 108 (90 Base) MCG/ACT inhaler, INHALE TWO PUFFS BY MOUTH EVERY 4 TO 6 HOURS AS NEEDED FOR COUGH ,  SHORTNESS OF BREATH , OR WHEEZING, Disp: 18 g, Rfl: 0    atorvastatin (Lipitor) 80 MG tablet, Take 1 tablet by mouth Daily., Disp: 30 tablet, Rfl: 5    bisoprolol (ZEBeta) 10 MG tablet, Take 1 tablet by mouth Daily., Disp: 90 tablet, Rfl: 3    buPROPion XL (WELLBUTRIN XL) 300 MG 24 hr tablet, Take 1 tablet by mouth Daily., Disp:  30 tablet, Rfl: 2    Diclofenac Sodium (VOLTAREN) 1 % gel gel, APPLY ONE GRAM TOPICALLY TO INDICATED AREA FOUR TIMES A DAY AS NEEDED FOR MILD TO MODERATE PAIN, Disp: 100 g, Rfl: 2    FeroSul 325 (65 Fe) MG tablet, TAKE ONE TABLET BY MOUTH DAILY WITH BREAKFAST, Disp: 30 tablet, Rfl: 5    FLUoxetine (PROzac) 20 MG capsule, Take 3 capsules by mouth Daily., Disp: 90 capsule, Rfl: 5    folic acid (FOLVITE) 1 MG tablet, Take 1 tablet by mouth Daily., Disp: 90 tablet, Rfl: 3    lisinopril (PRINIVIL,ZESTRIL) 40 MG tablet, TAKE 1 TABLET BY MOUTH DAILY, Disp: 90 tablet, Rfl: 1    loratadine (CLARITIN) 10 MG tablet, Take 1 tablet by mouth Daily., Disp: 30 tablet, Rfl: 5    meloxicam (MOBIC) 15 MG tablet, Take 1/2 to 1 tablet by mouth once daily as needed for moderate pain. Take with food, Disp: 30 tablet, Rfl: 2    methocarbamol (ROBAXIN) 750 MG tablet, Take 1/2-1 tablet by mouth 4 times daily as needed for muscle spasms., Disp: 60 tablet, Rfl: 2    montelukast (SINGULAIR) 10 MG tablet, TAKE ONE TABLET BY MOUTH ONCE NIGHTLY, Disp: 90 tablet, Rfl: 1    naltrexone (DEPADE) 50 MG tablet, Take 1 tablet by mouth Daily., Disp: 30 tablet, Rfl: 5    nicotine (Nicoderm CQ) 21 MG/24HR patch, Place 1 patch on the skin as directed by provider Daily., Disp: 28 each, Rfl: 1    potassium chloride ER (K-TAB) 20 MEQ tablet controlled-release ER tablet, TAKE 1 TABLET BY MOUTH DAILY, Disp: 90 tablet, Rfl: 1    spironolactone (Aldactone) 25 MG tablet, Take 1 tablet by mouth Daily., Disp: 90 tablet, Rfl: 3    traZODone (DESYREL) 50 MG tablet, TAKE 1 TO 2 TABLETS BY MOUTH 1 HOUR BEFORE BEDTIME AS NEEDED FOR SLEEP, Disp: 45 tablet, Rfl: 2     Plan:    1. Acute systolic heart failure  - New, unstable.  - Rechecking BNP.  - Continue bisoprolol and spironolactone.  - no BLE edema  - Rechecking her blood work today and if needed, we will make these changes.  - has upcoming appt with cardiologist, dr. Keith.     2. Heart failure  - Chronic, unstable.  -  Checking BMP, CMP, and CBC.  - Based on results, we will start patient on Entresto and Lasix.  - For now, continue bisoprolol, lisinopril, and spironolactone.    3. Hypertension  - Chronic, stable.  - Continue lisinopril, bisoprolol, and spironolactone.  - Follow a heart healthy, low cholesterol diet.    4. Acute pain of left shoulder  - New, unstable.  - X-ray ordered.    5. Thrombocytosis  - New, unstable.  - Checking CBC with differential.    Plan of care reviewed with the patient at the conclusion of today's visit.  Education was provided regarding diagnosis, management, and any prescribed or recommended OTC medications.  Patient verbalized understanding of and agreement with management plan.     Return in about 2 weeks (around 12/18/2023), or if symptoms worsen or fail to improve, for Follow-up.      Transcribed from ambient dictation for MARTIN Ramirez by Kamran Charles.  12/04/23   10:03 EST    Patient or patient representative verbalized consent to the visit recording.  I have personally performed the services described in this document as transcribed by the above individual, and it is both accurate and complete.

## 2023-12-06 ENCOUNTER — HOSPITAL ENCOUNTER (OUTPATIENT)
Dept: PULMONOLOGY | Facility: HOSPITAL | Age: 58
Discharge: HOME | End: 2023-12-06
Admitting: NURSE PRACTITIONER
Payer: MEDICAID

## 2023-12-06 DIAGNOSIS — J96.02 ACUTE RESPIRATORY FAILURE WITH HYPERCAPNIA: ICD-10-CM

## 2023-12-06 PROCEDURE — 94799 UNLISTED PULMONARY SVC/PX: CPT

## 2023-12-06 PROCEDURE — 94060 EVALUATION OF WHEEZING: CPT

## 2023-12-06 PROCEDURE — 94727 GAS DIL/WSHOT DETER LNG VOL: CPT

## 2023-12-06 PROCEDURE — 94010 BREATHING CAPACITY TEST: CPT

## 2023-12-06 RX ORDER — ALBUTEROL SULFATE 2.5 MG/3ML
2.5 SOLUTION RESPIRATORY (INHALATION) ONCE
Status: COMPLETED | OUTPATIENT
Start: 2023-12-06 | End: 2023-12-06

## 2023-12-06 RX ADMIN — ALBUTEROL SULFATE 2.5 MG: 2.5 SOLUTION RESPIRATORY (INHALATION) at 09:46

## 2023-12-11 ENCOUNTER — OFFICE VISIT (OUTPATIENT)
Dept: INTERNAL MEDICINE | Facility: CLINIC | Age: 58
End: 2023-12-11
Payer: MEDICAID

## 2023-12-11 VITALS
HEART RATE: 66 BPM | DIASTOLIC BLOOD PRESSURE: 92 MMHG | SYSTOLIC BLOOD PRESSURE: 136 MMHG | RESPIRATION RATE: 16 BRPM | TEMPERATURE: 97.9 F | OXYGEN SATURATION: 97 % | WEIGHT: 131 LBS | BODY MASS INDEX: 23.21 KG/M2 | HEIGHT: 63 IN

## 2023-12-11 DIAGNOSIS — E87.6 HYPOKALEMIA: ICD-10-CM

## 2023-12-11 DIAGNOSIS — I10 ESSENTIAL HYPERTENSION: ICD-10-CM

## 2023-12-11 DIAGNOSIS — I50.21 ACUTE SYSTOLIC CHF (CONGESTIVE HEART FAILURE): Primary | ICD-10-CM

## 2023-12-11 DIAGNOSIS — E78.2 MIXED HYPERLIPIDEMIA: ICD-10-CM

## 2023-12-11 RX ORDER — FUROSEMIDE 20 MG/1
20 TABLET ORAL DAILY
Qty: 30 TABLET | Refills: 0 | Status: SHIPPED | OUTPATIENT
Start: 2023-12-11

## 2023-12-11 NOTE — PROGRESS NOTES
Subjective   Chief Complaint   Patient presents with    Follow-up      Jamaica Shell is a 58 y.o. female.     The patient is here today for a follow-up. Her pulmonary function test was normal. She has an upcoming CT scan of the chest and she has CT scan of the abdomen due to the renal cyst that was visualized during hospitalization. Her last LDL was significantly elevated, but she is now taking atorvastatin 80 mg.    Patient was hospitalized for heart failure on 10/25/23. She was discharged on entresto daily and lasix PRN for edema. There was some confusion with her medications at discharge and she did not start these but continued lisinopril, spironolactone with potassium, and bisoprolol. Denies any edema. Has some intermittent shortness of breath on exertion but does not feel this has worsened and same as it has been in the past. Last set of labs 2 weeks ago showed BNP continues to be elevated. Have discussed switching lisinopril to entresto but she is fearful of BP instability and has appt with Dr. Keith, cardiologist on 12/20/2023. She has hx of hypokalemia but stable with spironolactone and potassium. Concern for lasix dropping potassium but needs additional diuretic for heart failure. .     The patient's pulmonary function test was normal. She has upcoming CT scan of chest and she has CT scan of abdomen due to the renal cyst that was visualized during hospitalization. Her last LDL was significantly elevated, but she is taking now taking atorvastatin 80 mg. She is not fasting today but will come by for labs including lipid panel prior to appt with cardiologist.     .    I have reviewed the following portions of the patient's history and confirmed they are accurate: allergies, current medications, past family history, past medical history, past social history, past surgical history, and problem list    Review of Systems  Pertinent items are noted in HPI.     Current Outpatient Medications on File Prior to  Visit   Medication Sig    albuterol sulfate HFA (Ventolin HFA) 108 (90 Base) MCG/ACT inhaler INHALE TWO PUFFS BY MOUTH EVERY 4 TO 6 HOURS AS NEEDED FOR COUGH ,  SHORTNESS OF BREATH , OR WHEEZING    atorvastatin (Lipitor) 80 MG tablet Take 1 tablet by mouth Daily.    bisoprolol (ZEBeta) 10 MG tablet Take 1 tablet by mouth Daily.    buPROPion XL (WELLBUTRIN XL) 300 MG 24 hr tablet Take 1 tablet by mouth Daily.    Diclofenac Sodium (VOLTAREN) 1 % gel gel APPLY ONE GRAM TOPICALLY TO INDICATED AREA FOUR TIMES A DAY AS NEEDED FOR MILD TO MODERATE PAIN    FeroSul 325 (65 Fe) MG tablet TAKE ONE TABLET BY MOUTH DAILY WITH BREAKFAST    FLUoxetine (PROzac) 20 MG capsule Take 3 capsules by mouth Daily.    folic acid (FOLVITE) 1 MG tablet Take 1 tablet by mouth Daily.    lisinopril (PRINIVIL,ZESTRIL) 40 MG tablet TAKE 1 TABLET BY MOUTH DAILY    loratadine (CLARITIN) 10 MG tablet Take 1 tablet by mouth Daily.    meloxicam (MOBIC) 15 MG tablet Take 1/2 to 1 tablet by mouth once daily as needed for moderate pain. Take with food    methocarbamol (ROBAXIN) 750 MG tablet Take 1/2-1 tablet by mouth 4 times daily as needed for muscle spasms.    montelukast (SINGULAIR) 10 MG tablet TAKE ONE TABLET BY MOUTH ONCE NIGHTLY    naltrexone (DEPADE) 50 MG tablet Take 1 tablet by mouth Daily.    nicotine (Nicoderm CQ) 21 MG/24HR patch Place 1 patch on the skin as directed by provider Daily.    potassium chloride ER (K-TAB) 20 MEQ tablet controlled-release ER tablet TAKE 1 TABLET BY MOUTH DAILY    spironolactone (Aldactone) 25 MG tablet Take 1 tablet by mouth Daily.    traZODone (DESYREL) 50 MG tablet TAKE 1 TO 2 TABLETS BY MOUTH 1 HOUR BEFORE BEDTIME AS NEEDED FOR SLEEP     No current facility-administered medications on file prior to visit.       Objective   Vitals:    12/11/23 0914   BP: 136/92   BP Location: Left arm   Patient Position: Sitting   Cuff Size: Adult   Pulse: 66   Resp: 16   Temp: 97.9 °F (36.6 °C)   TempSrc: Temporal   SpO2: 97%  "  Weight: 59.4 kg (131 lb)   Height: 160 cm (63\")   PainSc: 10-Worst pain ever   PainLoc: Shoulder     Body mass index is 23.21 kg/m².    Physical Exam  Vitals reviewed.   Constitutional:       Appearance: Normal appearance. She is well-developed.   HENT:      Head: Normocephalic and atraumatic.      Nose: Nose normal.   Eyes:      General: Lids are normal.      Conjunctiva/sclera: Conjunctivae normal.      Pupils: Pupils are equal, round, and reactive to light.   Neck:      Thyroid: No thyromegaly.      Trachea: Trachea normal.   Cardiovascular:      Rate and Rhythm: Normal rate and regular rhythm.      Heart sounds: Normal heart sounds.   Pulmonary:      Effort: Pulmonary effort is normal. No respiratory distress.      Breath sounds: Normal breath sounds.   Musculoskeletal:      Right lower leg: No edema.      Left lower leg: No edema.   Skin:     General: Skin is warm and dry.   Neurological:      Mental Status: She is alert and oriented to person, place, and time.      GCS: GCS eye subscore is 4. GCS verbal subscore is 5. GCS motor subscore is 6.   Psychiatric:         Attention and Perception: Attention normal.         Mood and Affect: Mood normal.         Speech: Speech normal.         Behavior: Behavior normal. Behavior is cooperative.         Thought Content: Thought content normal.         Assessment & Plan   Problem List Items Addressed This Visit       Essential hypertension    Relevant Medications    furosemide (Lasix) 20 MG tablet    Other Relevant Orders    Comprehensive Metabolic Panel    Hypokalemia    Relevant Orders    Comprehensive Metabolic Panel     Other Visit Diagnoses       Acute systolic CHF (congestive heart failure)    -  Primary    Relevant Medications    furosemide (Lasix) 20 MG tablet    Other Relevant Orders    Comprehensive Metabolic Panel    proBNP    Mixed hyperlipidemia        Relevant Orders    Lipid Panel               Current Outpatient Medications:     albuterol sulfate HFA " (Ventolin HFA) 108 (90 Base) MCG/ACT inhaler, INHALE TWO PUFFS BY MOUTH EVERY 4 TO 6 HOURS AS NEEDED FOR COUGH ,  SHORTNESS OF BREATH , OR WHEEZING, Disp: 18 g, Rfl: 0    atorvastatin (Lipitor) 80 MG tablet, Take 1 tablet by mouth Daily., Disp: 30 tablet, Rfl: 5    bisoprolol (ZEBeta) 10 MG tablet, Take 1 tablet by mouth Daily., Disp: 90 tablet, Rfl: 3    buPROPion XL (WELLBUTRIN XL) 300 MG 24 hr tablet, Take 1 tablet by mouth Daily., Disp: 30 tablet, Rfl: 2    Diclofenac Sodium (VOLTAREN) 1 % gel gel, APPLY ONE GRAM TOPICALLY TO INDICATED AREA FOUR TIMES A DAY AS NEEDED FOR MILD TO MODERATE PAIN, Disp: 100 g, Rfl: 2    FeroSul 325 (65 Fe) MG tablet, TAKE ONE TABLET BY MOUTH DAILY WITH BREAKFAST, Disp: 30 tablet, Rfl: 5    FLUoxetine (PROzac) 20 MG capsule, Take 3 capsules by mouth Daily., Disp: 90 capsule, Rfl: 5    folic acid (FOLVITE) 1 MG tablet, Take 1 tablet by mouth Daily., Disp: 90 tablet, Rfl: 3    lisinopril (PRINIVIL,ZESTRIL) 40 MG tablet, TAKE 1 TABLET BY MOUTH DAILY, Disp: 90 tablet, Rfl: 1    loratadine (CLARITIN) 10 MG tablet, Take 1 tablet by mouth Daily., Disp: 30 tablet, Rfl: 5    meloxicam (MOBIC) 15 MG tablet, Take 1/2 to 1 tablet by mouth once daily as needed for moderate pain. Take with food, Disp: 30 tablet, Rfl: 2    methocarbamol (ROBAXIN) 750 MG tablet, Take 1/2-1 tablet by mouth 4 times daily as needed for muscle spasms., Disp: 60 tablet, Rfl: 2    montelukast (SINGULAIR) 10 MG tablet, TAKE ONE TABLET BY MOUTH ONCE NIGHTLY, Disp: 90 tablet, Rfl: 1    naltrexone (DEPADE) 50 MG tablet, Take 1 tablet by mouth Daily., Disp: 30 tablet, Rfl: 5    nicotine (Nicoderm CQ) 21 MG/24HR patch, Place 1 patch on the skin as directed by provider Daily., Disp: 28 each, Rfl: 1    potassium chloride ER (K-TAB) 20 MEQ tablet controlled-release ER tablet, TAKE 1 TABLET BY MOUTH DAILY, Disp: 90 tablet, Rfl: 1    spironolactone (Aldactone) 25 MG tablet, Take 1 tablet by mouth Daily., Disp: 90 tablet, Rfl: 3     traZODone (DESYREL) 50 MG tablet, TAKE 1 TO 2 TABLETS BY MOUTH 1 HOUR BEFORE BEDTIME AS NEEDED FOR SLEEP, Disp: 45 tablet, Rfl: 2    furosemide (Lasix) 20 MG tablet, Take 1 tablet by mouth Daily., Disp: 30 tablet, Rfl: 0     1. Acute systolic heart failure.  - New, unstable.  - Start Lasix. Continue spironalactone and potassium. Potassium level was normal 2 weeks ago. Continue bisoprolol.  - Patient will come by in 1 week to recheck CMP and BNP  - She has an appointment to establish care with cardiology in 10 days.  - Discussed with patient that cardiologist may decide to switch her to Entresto.    2. Hypertension.  - Chronic, stable.  - Continue lisinopril and bisoprolol.  - Starting Lasix to help with heart failure.  - Continue spironolactone and potassium.  - recheck labs in one week.     3. Hyperlipidemia.  - Chronic, unstable.  - Checking fasting lipid panel in 1 week.  - Continue atorvastatin.  - Follow heart healthy, low cholesterol diet.    4. Hypokalemia.  - Chronic, stable.  - Continue spironolactone and potassium.  - Starting Lasix.  - Patient will come by in 1 week to recheck CMP.    Plan of care reviewed with the patient at the conclusion of today's visit.  Education was provided regarding diagnosis, management, and any prescribed or recommended OTC medications.  Patient verbalized understanding of and agreement with management plan.     Return if symptoms worsen or fail to improve, for Follow-up.        Transcribed from ambient dictation for MARTIN Ramirez by Inez Morales.  12/11/23   12:02 EST    Patient or patient representative verbalized consent to the visit recording.  I have personally performed the services described in this document as transcribed by the above individual, and it is both accurate and complete.

## 2023-12-20 ENCOUNTER — OFFICE VISIT (OUTPATIENT)
Dept: CARDIOLOGY | Facility: CLINIC | Age: 58
End: 2023-12-20
Payer: MEDICAID

## 2023-12-20 ENCOUNTER — LAB (OUTPATIENT)
Dept: INTERNAL MEDICINE | Facility: CLINIC | Age: 58
End: 2023-12-20
Payer: MEDICAID

## 2023-12-20 VITALS
SYSTOLIC BLOOD PRESSURE: 140 MMHG | HEART RATE: 75 BPM | WEIGHT: 131.06 LBS | DIASTOLIC BLOOD PRESSURE: 80 MMHG | BODY MASS INDEX: 23.22 KG/M2 | OXYGEN SATURATION: 97 % | HEIGHT: 63 IN

## 2023-12-20 DIAGNOSIS — I50.22 CHRONIC HFREF (HEART FAILURE WITH REDUCED EJECTION FRACTION): ICD-10-CM

## 2023-12-20 DIAGNOSIS — I10 ESSENTIAL HYPERTENSION: ICD-10-CM

## 2023-12-20 DIAGNOSIS — I47.19 PAT (PAROXYSMAL ATRIAL TACHYCARDIA): ICD-10-CM

## 2023-12-20 DIAGNOSIS — I50.21 ACUTE SYSTOLIC CHF (CONGESTIVE HEART FAILURE): ICD-10-CM

## 2023-12-20 DIAGNOSIS — I10 ESSENTIAL HYPERTENSION: Primary | ICD-10-CM

## 2023-12-20 DIAGNOSIS — E87.6 HYPOKALEMIA: ICD-10-CM

## 2023-12-20 DIAGNOSIS — E78.2 MIXED HYPERLIPIDEMIA: ICD-10-CM

## 2023-12-20 LAB
ALBUMIN SERPL-MCNC: 3.7 G/DL (ref 3.5–5.2)
ALBUMIN/GLOB SERPL: 1.4 G/DL
ALP SERPL-CCNC: 71 U/L (ref 39–117)
ALT SERPL W P-5'-P-CCNC: 24 U/L (ref 1–33)
ANION GAP SERPL CALCULATED.3IONS-SCNC: 9.9 MMOL/L (ref 5–15)
AST SERPL-CCNC: 24 U/L (ref 1–32)
BILIRUB SERPL-MCNC: 0.2 MG/DL (ref 0–1.2)
BUN SERPL-MCNC: 12 MG/DL (ref 6–20)
BUN/CREAT SERPL: 12.9 (ref 7–25)
CALCIUM SPEC-SCNC: 9.4 MG/DL (ref 8.6–10.5)
CHLORIDE SERPL-SCNC: 107 MMOL/L (ref 98–107)
CHOLEST SERPL-MCNC: 178 MG/DL (ref 0–200)
CO2 SERPL-SCNC: 23.1 MMOL/L (ref 22–29)
CREAT SERPL-MCNC: 0.93 MG/DL (ref 0.57–1)
EGFRCR SERPLBLD CKD-EPI 2021: 72.3 ML/MIN/1.73
GLOBULIN UR ELPH-MCNC: 2.6 GM/DL
GLUCOSE SERPL-MCNC: 91 MG/DL (ref 65–99)
HDLC SERPL-MCNC: 55 MG/DL (ref 40–60)
LDLC SERPL CALC-MCNC: 105 MG/DL (ref 0–100)
LDLC/HDLC SERPL: 1.88 {RATIO}
NT-PROBNP SERPL-MCNC: 2550 PG/ML (ref 0–900)
POTASSIUM SERPL-SCNC: 3.7 MMOL/L (ref 3.5–5.2)
PROT SERPL-MCNC: 6.3 G/DL (ref 6–8.5)
SODIUM SERPL-SCNC: 140 MMOL/L (ref 136–145)
TRIGL SERPL-MCNC: 98 MG/DL (ref 0–150)
VLDLC SERPL-MCNC: 18 MG/DL (ref 5–40)

## 2023-12-20 PROCEDURE — 80061 LIPID PANEL: CPT | Performed by: NURSE PRACTITIONER

## 2023-12-20 PROCEDURE — 83880 ASSAY OF NATRIURETIC PEPTIDE: CPT | Performed by: NURSE PRACTITIONER

## 2023-12-20 PROCEDURE — 80053 COMPREHEN METABOLIC PANEL: CPT | Performed by: NURSE PRACTITIONER

## 2023-12-20 PROCEDURE — 36415 COLL VENOUS BLD VENIPUNCTURE: CPT | Performed by: NURSE PRACTITIONER

## 2023-12-20 NOTE — PROGRESS NOTES
[facility] at [facility]  Office visit  Jamaica Shell  1965  [phone]  There is no work phone number on file.    VISIT DATE:  12/20/2023    PCP: Candace Cam, MARTIN  [address] [address]    CC:  Chief Complaint   Patient presents with    Essential hypertension       Previous cardiac studies and procedures:  May 2020   Echo  Left ventricular systolic function is normal.  Estimated EF appears to be in the range of 56 - 60%.  Left ventricular wall thickness is consistent with mild concentric hypertrophy.    30-day event monitor  Episodes of PSVT, likely nonsustained atrial tachycardia, 8-25 beats in duration.    June 2020 stress echocardiogram  Pt denied chest pain, pressure or SOA.  Expected exercise time: 7:45 actual time: 5:00  Hypertensive response to exercise with a blood pressure of 210/110 mmHg.  Left ventricular systolic function is mildly decreased.  Calculated EF = 41%. Estimated EF appears to be in the range of 46 - 50%.  Left ventricular wall thickness is consistent with mild concentric hypertrophy.  Left ventricular diastolic dysfunction (grade I) consistent with impaired relaxation.  Small fixed distal anterior lateral wall defect either secondary to small region of infarction versus underlying hypertensive cardiomyopathy. No definitive ischemia visualized.    October 2023 TTE   Normal sized left ventricle.    Normal left ventricular wall thickness.    Moderate left ventricular systolic dysfunction.    Estimated ejection fraction 35% +/- 5%.    Impaired left ventricular relaxation, elevated left atrial pressure.     ASSESSMENT:   Diagnosis Plan   1. Essential hypertension        2. PAT (paroxysmal atrial tachycardia)        3. Chronic HFrEF (heart failure with reduced ejection fraction)              PLAN:  Heart failure with reduced ejection fraction, chronic: Secondary to hypertension and history of alcohol abuse.  Switching lisinopril  to Entresto.  Otherwise continue current medical therapy.  Will arrange for follow-up with heart valve clinic for rapid titration of guideline directed medical therapy.  Discussed sobriety today.  Starting Jardiance 10 mg p.o. daily.    PSVT: Nonsustained atrial tachycardia.  Offered reassurance.  Limited symptoms.  Continue bisoprolol 10 mg p.o. daily.     Hypertension: Goal is 130/80 mmHg.    Avoiding calcium channel blockade due to lower extremity edema.  History of persistent hypokalemia, negative hyperaldosterone eval.  Continue lisinopril and bisoprolol.  We will add back spironolactone 25 mg p.o. daily if blood pressures trend upward.  She was instructed to bring all of her pill bottles to her primary care provider's visit so that they can be reviewed.    Hyperlipidemia: Goal LDL less than 130, ideally less than 100.    Continue statin therapy.    Subjective  Initial evaluation: Recent admission at [facility] for hypertensive emergency with acute CHF and bilateral pneumonia requiring brief course of mechanical ventilation.  She reports that she has been sober for 2 months.  Has residual shortness of breath in a class II-III pattern.  Compliant with medical therapy.    Initial evaluation:54-year-old -American female with history of hypertension and dyslipidemia presenting with worsening bilateral lower extreme edema, palpitations and dyspnea on exertion.  Describes intermittent episodes of a racing heartbeat in which it feels like her heart is beating fast and hard.  No obvious triggers.  Chest does feel uncomfortable during these episodes.  Often at rest.  She does describe some shortness of breath with activity and a class II pattern.  Denies exertional dyspnea.  Has a significant dyslipidemia with an LDL as high as 220 over the past 2 years, she was recently started on low-dose atorvastatin proximately 2 months ago.  She describes bilateral lower extremity edema which gradually worsens during the  "day.  She appears compliant with medical therapy.  Lisinopril hydrochlorothiazide was recently increased to 20-25 mg p.o. daily and amlodipine was recently discontinued.  She also has intermittent anxiety for which she is being treated.    PHYSICAL EXAMINATION:  Vitals:    12/20/23 1052   BP: 140/80   BP Location: Right arm   Patient Position: Sitting   Cuff Size: Adult   Pulse: 75   SpO2: 97%   Weight: 59.4 kg (131 lb 1 oz)   Height: 160 cm (63\")     General Appearance:    Alert, cooperative, no distress, appears stated age   Head:    Normocephalic, without obvious abnormality, atraumatic   Eyes:    conjunctiva/corneas clear   Nose:   Nares normal, septum midline, mucosa normal, no drainage   Throat:   Lips, teeth and gums normal   Neck:   Supple, symmetrical, trachea midline, no carotid    bruit or JVD   Lungs:     Clear to auscultation bilaterally, respirations unlabored   Chest Wall:    No tenderness or deformity    Heart:    Regular rate and rhythm, S1 and S2 normal, no murmur, rub   or gallop, normal carotid impulse bilaterally without bruit.   Abdomen:     Soft, non-tender   Extremities:   Extremities normal, atraumatic, no cyanosis or edema   Pulses:   2+ and symmetric all extremities   Skin:   Skin color, texture, turgor normal, no rashes or lesions       Diagnostic Data:    ECG 12 Lead    Date/Time: 12/20/2023 11:16 AM  Performed by: Emmanuel Keith III, MD    Authorized by: Emmanuel Keith III, MD  Comparison: compared with previous ECG from 8/4/2020  Similar to previous ECG  Rhythm: sinus rhythm  Conduction: incomplete right bundle branch block  Other findings: non-specific ST-T wave changes    Clinical impression: abnormal EKG        Lab Results   Component Value Date    TRIG 133 04/27/2023    HDL 85 (H) 04/27/2023     Lab Results   Component Value Date    GLUCOSE 90 12/04/2023    BUN 11 12/04/2023    CREATININE 0.82 12/04/2023     12/04/2023    K 4.5 12/04/2023     (H) 12/04/2023    CO2 21.9 " (L) 12/04/2023     Lab Results   Component Value Date    HGBA1C 5.40 10/27/2022     Lab Results   Component Value Date    WBC 7.04 12/04/2023    HGB 12.5 12/04/2023    HCT 40.5 12/04/2023     12/04/2023       Allergies  No Known Allergies    Current Medications    Current Outpatient Medications:     albuterol sulfate HFA (Ventolin HFA) 108 (90 Base) MCG/ACT inhaler, INHALE TWO PUFFS BY MOUTH EVERY 4 TO 6 HOURS AS NEEDED FOR COUGH ,  SHORTNESS OF BREATH , OR WHEEZING, Disp: 18 g, Rfl: 0    atorvastatin (Lipitor) 80 MG tablet, Take 1 tablet by mouth Daily., Disp: 30 tablet, Rfl: 5    bisoprolol (ZEBeta) 10 MG tablet, Take 1 tablet by mouth Daily., Disp: 90 tablet, Rfl: 3    buPROPion XL (WELLBUTRIN XL) 300 MG 24 hr tablet, Take 1 tablet by mouth Daily., Disp: 30 tablet, Rfl: 2    Diclofenac Sodium (VOLTAREN) 1 % gel gel, APPLY ONE GRAM TOPICALLY TO INDICATED AREA FOUR TIMES A DAY AS NEEDED FOR MILD TO MODERATE PAIN, Disp: 100 g, Rfl: 2    FeroSul 325 (65 Fe) MG tablet, TAKE ONE TABLET BY MOUTH DAILY WITH BREAKFAST, Disp: 30 tablet, Rfl: 5    FLUoxetine (PROzac) 20 MG capsule, Take 3 capsules by mouth Daily., Disp: 90 capsule, Rfl: 5    folic acid (FOLVITE) 1 MG tablet, Take 1 tablet by mouth Daily., Disp: 90 tablet, Rfl: 3    furosemide (Lasix) 20 MG tablet, Take 1 tablet by mouth Daily., Disp: 30 tablet, Rfl: 0    loratadine (CLARITIN) 10 MG tablet, Take 1 tablet by mouth Daily., Disp: 30 tablet, Rfl: 5    meloxicam (MOBIC) 15 MG tablet, Take 1/2 to 1 tablet by mouth once daily as needed for moderate pain. Take with food, Disp: 30 tablet, Rfl: 2    methocarbamol (ROBAXIN) 750 MG tablet, Take 1/2-1 tablet by mouth 4 times daily as needed for muscle spasms., Disp: 60 tablet, Rfl: 2    montelukast (SINGULAIR) 10 MG tablet, TAKE ONE TABLET BY MOUTH ONCE NIGHTLY, Disp: 90 tablet, Rfl: 1    naltrexone (DEPADE) 50 MG tablet, Take 1 tablet by mouth Daily., Disp: 30 tablet, Rfl: 5    nicotine (Nicoderm CQ) 21 MG/24HR  patch, Place 1 patch on the skin as directed by provider Daily., Disp: 28 each, Rfl: 1    potassium chloride ER (K-TAB) 20 MEQ tablet controlled-release ER tablet, TAKE 1 TABLET BY MOUTH DAILY, Disp: 90 tablet, Rfl: 1    spironolactone (Aldactone) 25 MG tablet, Take 1 tablet by mouth Daily., Disp: 90 tablet, Rfl: 3    traZODone (DESYREL) 50 MG tablet, TAKE 1 TO 2 TABLETS BY MOUTH 1 HOUR BEFORE BEDTIME AS NEEDED FOR SLEEP, Disp: 45 tablet, Rfl: 2    empagliflozin (Jardiance) 10 MG tablet tablet, Take 1 tablet by mouth Daily., Disp: 30 tablet, Rfl: 5    sacubitril-valsartan (ENTRESTO) 49-51 MG tablet, Take 1 tablet by mouth 2 (Two) Times a Day., Disp: 60 tablet, Rfl: 5          ROS  Review of Systems   Cardiovascular:  Positive for dyspnea on exertion, leg swelling and palpitations. Negative for chest pain and near-syncope.   Respiratory:  Negative for cough and snoring.        SOCIAL HX  Social History     Socioeconomic History    Marital status: Single   Tobacco Use    Smoking status: Every Day     Packs/day: 0.50     Years: 20.00     Additional pack years: 0.00     Total pack years: 10.00     Types: Cigarettes     Passive exposure: Current    Smokeless tobacco: Never    Tobacco comments:     Pt states around 0.5 pack sometimes more due to not drinking alcohol anymore   Vaping Use    Vaping Use: Never used   Substance and Sexual Activity    Alcohol use: Not Currently     Comment: reports on 7/21/2021- 1 pint of bourbon/mignon per day since 2011    Drug use: No    Sexual activity: Defer       FAMILY HX  Family History   Problem Relation Age of Onset    Diabetes Mother     No Known Problems Father              Emmanuel Keith III, MD, FACC

## 2023-12-22 DIAGNOSIS — F41.8 DEPRESSION WITH ANXIETY: ICD-10-CM

## 2023-12-22 RX ORDER — BUPROPION HYDROCHLORIDE 300 MG/1
300 TABLET ORAL DAILY
Qty: 30 TABLET | Refills: 2 | Status: SHIPPED | OUTPATIENT
Start: 2023-12-22

## 2024-01-04 ENCOUNTER — LAB (OUTPATIENT)
Dept: LAB | Facility: HOSPITAL | Age: 59
End: 2024-01-04
Payer: MEDICAID

## 2024-01-04 ENCOUNTER — TELEPHONE (OUTPATIENT)
Dept: CARDIOLOGY | Facility: HOSPITAL | Age: 59
End: 2024-01-04

## 2024-01-04 ENCOUNTER — OFFICE VISIT (OUTPATIENT)
Dept: CARDIOLOGY | Facility: HOSPITAL | Age: 59
End: 2024-01-04
Payer: MEDICAID

## 2024-01-04 VITALS
RESPIRATION RATE: 16 BRPM | HEART RATE: 72 BPM | BODY MASS INDEX: 23.35 KG/M2 | DIASTOLIC BLOOD PRESSURE: 92 MMHG | WEIGHT: 131.8 LBS | HEIGHT: 63 IN | OXYGEN SATURATION: 99 % | TEMPERATURE: 97.1 F | SYSTOLIC BLOOD PRESSURE: 157 MMHG

## 2024-01-04 DIAGNOSIS — R35.1 NOCTURIA: ICD-10-CM

## 2024-01-04 DIAGNOSIS — E87.6 HYPOKALEMIA: ICD-10-CM

## 2024-01-04 DIAGNOSIS — I50.22 CHRONIC HFREF (HEART FAILURE WITH REDUCED EJECTION FRACTION): ICD-10-CM

## 2024-01-04 DIAGNOSIS — I50.22 CHRONIC HFREF (HEART FAILURE WITH REDUCED EJECTION FRACTION): Primary | ICD-10-CM

## 2024-01-04 DIAGNOSIS — I10 ESSENTIAL HYPERTENSION: ICD-10-CM

## 2024-01-04 DIAGNOSIS — R53.83 FATIGUE, UNSPECIFIED TYPE: ICD-10-CM

## 2024-01-04 DIAGNOSIS — I47.19 PAT (PAROXYSMAL ATRIAL TACHYCARDIA): ICD-10-CM

## 2024-01-04 DIAGNOSIS — R06.83 SNORING: ICD-10-CM

## 2024-01-04 LAB
ANION GAP SERPL CALCULATED.3IONS-SCNC: 9 MMOL/L (ref 5–15)
BUN SERPL-MCNC: 9 MG/DL (ref 6–20)
BUN/CREAT SERPL: 10.2 (ref 7–25)
CALCIUM SPEC-SCNC: 9 MG/DL (ref 8.6–10.5)
CHLORIDE SERPL-SCNC: 106 MMOL/L (ref 98–107)
CO2 SERPL-SCNC: 26 MMOL/L (ref 22–29)
CREAT SERPL-MCNC: 0.88 MG/DL (ref 0.57–1)
EGFRCR SERPLBLD CKD-EPI 2021: 77.2 ML/MIN/1.73
GLUCOSE SERPL-MCNC: 100 MG/DL (ref 65–99)
NT-PROBNP SERPL-MCNC: 1378 PG/ML (ref 0–900)
POTASSIUM SERPL-SCNC: 4.8 MMOL/L (ref 3.5–5.2)
SODIUM SERPL-SCNC: 141 MMOL/L (ref 136–145)

## 2024-01-04 PROCEDURE — 83880 ASSAY OF NATRIURETIC PEPTIDE: CPT

## 2024-01-04 PROCEDURE — 36415 COLL VENOUS BLD VENIPUNCTURE: CPT

## 2024-01-04 PROCEDURE — 80048 BASIC METABOLIC PNL TOTAL CA: CPT

## 2024-01-04 NOTE — TELEPHONE ENCOUNTER
Please call patient let her know that her labs have been reviewed and are stable.  She should continue her medications as prescribed with no changes at this time.

## 2024-01-04 NOTE — PROGRESS NOTES
"[facility], [facility]    Chief Complaint  Congestive Heart Failure    Subjective    History of Present Illness {CC  Problem List  Visit  Diagnosis   Encounters  Notes  Medications  Labs  Result Review Imaging  Media :23}     Jamaica Shell presents to [facility] CARDIOLOGY for   History of Present Illness     58-year-old female with history of chronic heart failure with reduced EF, SVT/PAT, hypertension, Hyperlipidemia, IBS, depression/anxiety, alcohol dependence (cessation since 11/2023), cerebral microvascular disease, asthma.  History of hypokalemia negative hyperaldosterone evaluation, tobacco use (3/4 ppd).    Echocardiogram 10/2023: EF 35%.    Heart failure medications include Entresto, Jardiance, bisoprolol, spironolactone.    Followed by Dr. Keith with [facility].  Last visit with cardiology Entresto was initiated, lisinopril was discontinued.  Jardiance initiated.  Patient with lower extremity edema with trying to avoid calcium channel blockers.  History of persistent hypokalemia with negative hyperaldosterone evaluation.  Spironolactone was added.    No CP or pressure, improvement of dyspnea, improved palpitations. Edema has improved.  No dizziness, near syncope, syncope.  Moderate fatigue    Hx of snoring, nocturia.      Tolerating med changes    Objective     Vital Signs:   Vitals:    01/04/24 0830 01/04/24 0832 01/04/24 0833   BP: 157/89 161/83 157/92   BP Location: Right arm Left arm Left arm   Patient Position: Sitting Standing Sitting   Cuff Size: Adult Adult Adult   Pulse: 71 83 72   Resp:   16   Temp: 97.1 °F (36.2 °C) 97.1 °F (36.2 °C) 97.1 °F (36.2 °C)   TempSrc: Temporal Temporal Temporal   SpO2: 97% 95% 99%   Weight:   59.8 kg (131 lb 12.8 oz)   Height:   160 cm (63\")     Body mass index is 23.35 kg/m².  Physical Exam  Vitals reviewed.   Constitutional:       General: She is not in acute distress.     Appearance: Normal " appearance.   Neck:      Vascular: No carotid bruit.   Cardiovascular:      Rate and Rhythm: Normal rate and regular rhythm.      Pulses:           Radial pulses are 2+ on the right side and 2+ on the left side.        Dorsalis pedis pulses are 2+ on the right side and 2+ on the left side.        Posterior tibial pulses are 2+ on the right side and 2+ on the left side.      Heart sounds: Normal heart sounds. No murmur heard.  Pulmonary:      Effort: Pulmonary effort is normal.      Breath sounds: Normal breath sounds.   Musculoskeletal:      Right lower leg: No edema.      Left lower leg: No edema.   Skin:     General: Skin is warm and dry.   Neurological:      Mental Status: She is alert.   Psychiatric:         Mood and Affect: Mood normal.         Behavior: Behavior is cooperative.              Result Review  Data Reviewed:{ Labs  Result Review  Imaging  Med Tab  Media :23}   Echocardiogram 10/26/2023 ([facility]): EF 35%, no significant valvular disease    Stress echocardiogram 6/22/2020 EF 41%, fixed distal anterior lateral wall defect secondary small region of infarct to versus underlying hypertensive cardiomyopathy.  No definitive ischemia visualized.    30-day Holter 5/27/2020: PSVT likely atrial tachycardia 8-25 beats duration.    Lab on 12/20/2023   Component Date Value Ref Range Status    Glucose 12/20/2023 91  65 - 99 mg/dL Final    BUN 12/20/2023 12  6 - 20 mg/dL Final    Creatinine 12/20/2023 0.93  0.57 - 1.00 mg/dL Final    Sodium 12/20/2023 140  136 - 145 mmol/L Final    Potassium 12/20/2023 3.7  3.5 - 5.2 mmol/L Final    Chloride 12/20/2023 107  98 - 107 mmol/L Final    CO2 12/20/2023 23.1  22.0 - 29.0 mmol/L Final    Calcium 12/20/2023 9.4  8.6 - 10.5 mg/dL Final    Total Protein 12/20/2023 6.3  6.0 - 8.5 g/dL Final    Albumin 12/20/2023 3.7  3.5 - 5.2 g/dL Final    ALT (SGPT) 12/20/2023 24  1 - 33 U/L Final    AST (SGOT) 12/20/2023 24  1 - 32 U/L Final    Alkaline Phosphatase  12/20/2023 71  39 - 117 U/L Final    Total Bilirubin 12/20/2023 0.2  0.0 - 1.2 mg/dL Final    Globulin 12/20/2023 2.6  gm/dL Final    A/G Ratio 12/20/2023 1.4  g/dL Final    BUN/Creatinine Ratio 12/20/2023 12.9  7.0 - 25.0 Final    Anion Gap 12/20/2023 9.9  5.0 - 15.0 mmol/L Final    eGFR 12/20/2023 72.3  >60.0 mL/min/1.73 Final    proBNP 12/20/2023 2,550.0 (H)  0.0 - 900.0 pg/mL Final    Total Cholesterol 12/20/2023 178  0 - 200 mg/dL Final    Triglycerides 12/20/2023 98  0 - 150 mg/dL Final    HDL Cholesterol 12/20/2023 55  40 - 60 mg/dL Final    LDL Cholesterol  12/20/2023 105 (H)  0 - 100 mg/dL Final    VLDL Cholesterol 12/20/2023 18  5 - 40 mg/dL Final    LDL/HDL Ratio 12/20/2023 1.88   Final                   Assessment and Plan {CC Problem List  Visit Diagnosis  ROS  Review (Popup)  Health Maintenance  Quality  BestPractice  Medications  SmartSets  SnapShot Encounters  Media :23}   1. Chronic HFrEF (heart failure with reduced ejection fraction)  EF 35%  NYHA III    Improved dyspnea.  No edema.  Weight stable.  No PND or orthopnea.    Continue bisoprolol, Aldactone, Jardiance, Lasix    Labs today    increase enstresto    - Ambulatory Referral to Sleep Medicine    - sacubitril-valsartan (ENTRESTO)  MG tablet; Take 1 tablet by mouth 2 (Two) Times a Day.  Dispense: 60 tablet; Refill: 6    - Basic Metabolic Panel; Future  - proBNP; Future    Heart failure education discussed: What is heart failure, causes, signs and symptoms, medication management, daily weight monitoring, low-sodium diet of less than 2 g per day, daily exercise, role the heart failure center.    2. PAT (paroxysmal atrial tachycardia)  Palpitations have improved  Continue beta-blocker  - Ambulatory Referral to Sleep Medicine    3. Essential hypertension  Blood pressure not at goal today  Increase Entresto.  Continue bisoprolol, spironolactone  - Ambulatory Referral to Sleep Medicine  - sacubitril-valsartan (ENTRESTO)  MG  tablet; Take 1 tablet by mouth 2 (Two) Times a Day.  Dispense: 60 tablet; Refill: 6    Reports she has not had alcohol in 3 months.  4. Hypokalemia  Patient on spironolactone, potassium supplement.  Repeat labs today  - Basic Metabolic Panel; Future  - proBNP; Future    5. Fatigue, unspecified type    - Ambulatory Referral to Sleep Medicine    6. Nocturia    - Ambulatory Referral to Sleep Medicine    7. Snoring    - Ambulatory Referral to Sleep Medicine          Follow Up {Instructions Charge Capture  Follow-up Communications :23}   Return if symptoms worsen or fail to improve, for HF, Office visit.    Patient was given instructions and counseling regarding her condition or for health maintenance advice. Please see specific information pulled into the AVS if appropriate.  Patient was instructed to call the [facility] with any questions, concerns, or worsening symptoms.

## 2024-01-22 DIAGNOSIS — I50.21 ACUTE SYSTOLIC CHF (CONGESTIVE HEART FAILURE): ICD-10-CM

## 2024-01-22 DIAGNOSIS — I10 ESSENTIAL HYPERTENSION: ICD-10-CM

## 2024-01-22 RX ORDER — FUROSEMIDE 20 MG/1
20 TABLET ORAL DAILY
Qty: 30 TABLET | Refills: 0 | Status: SHIPPED | OUTPATIENT
Start: 2024-01-22

## 2024-01-29 ENCOUNTER — TELEPHONE (OUTPATIENT)
Dept: INTERNAL MEDICINE | Facility: CLINIC | Age: 59
End: 2024-01-29
Payer: MEDICAID

## 2024-01-29 NOTE — TELEPHONE ENCOUNTER
Caller: Jamaica Shell    Relationship: Self    Best call back number: [phone]     What medication are you requesting: IBUPROFEN 800 MG    What are your current symptoms: ARTHRITIS PAIN IN LEGS AND HURTS WITH WALKING     How long have you been experiencing symptoms: ABOUT A WEEK    Have you had these symptoms before:    [x] Yes  [] No    Have you been treated for these symptoms before:   [x] Yes  [] No    If a prescription is needed, what is your preferred pharmacy and phone number: [facility] [id number] - [place] [id number] [facility] - [phone] [facility] [phone] FX     Additional notes:  PATIENT STATES 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 PATIENT WAS GIVEN AN IBUPROFEN 800 MG BY A FRIEND WHICH SEEMED TO RELIEVE THE PAIN. PATIENT WANTED TO REQUEST A PRESCRIPTION FOR HERSELF.

## 2024-01-31 RX ORDER — IBUPROFEN 800 MG/1
800 TABLET ORAL EVERY 6 HOURS PRN
Qty: 60 TABLET | Refills: 0 | Status: SHIPPED | OUTPATIENT
Start: 2024-01-31

## 2024-01-31 NOTE — TELEPHONE ENCOUNTER
I sent ibu 800 but let patient know to stop the meloxicam and do not take this with other nsaids (advil, motrin, aleve, naproxen). She needs to drink adequate water and take this sparingly because can be hard on her kidneys. If leg pain continues needs to follow up.

## 2024-02-19 DIAGNOSIS — I10 ESSENTIAL HYPERTENSION: ICD-10-CM

## 2024-02-19 DIAGNOSIS — I50.21 ACUTE SYSTOLIC CHF (CONGESTIVE HEART FAILURE): ICD-10-CM

## 2024-02-19 RX ORDER — FUROSEMIDE 20 MG/1
20 TABLET ORAL DAILY
Qty: 30 TABLET | Refills: 0 | Status: SHIPPED | OUTPATIENT
Start: 2024-02-19

## 2024-03-06 ENCOUNTER — APPOINTMENT (OUTPATIENT)
Dept: GENERAL RADIOLOGY | Facility: HOSPITAL | Age: 59
End: 2024-03-06
Payer: MEDICAID

## 2024-03-06 ENCOUNTER — HOSPITAL ENCOUNTER (EMERGENCY)
Facility: HOSPITAL | Age: 59
Discharge: HOME | End: 2024-03-06
Attending: EMERGENCY MEDICINE | Admitting: EMERGENCY MEDICINE
Payer: MEDICAID

## 2024-03-06 VITALS
SYSTOLIC BLOOD PRESSURE: 139 MMHG | WEIGHT: 131 LBS | HEIGHT: 63 IN | RESPIRATION RATE: 16 BRPM | TEMPERATURE: 97.7 F | HEART RATE: 64 BPM | BODY MASS INDEX: 23.21 KG/M2 | OXYGEN SATURATION: 96 % | DIASTOLIC BLOOD PRESSURE: 91 MMHG

## 2024-03-06 DIAGNOSIS — F41.1 ANXIETY REACTION: Primary | ICD-10-CM

## 2024-03-06 LAB
ALBUMIN SERPL-MCNC: 3.8 G/DL (ref 3.5–5.2)
ALBUMIN/GLOB SERPL: 1.3 G/DL
ALP SERPL-CCNC: 102 U/L (ref 39–117)
ALT SERPL W P-5'-P-CCNC: 17 U/L (ref 1–33)
ANION GAP SERPL CALCULATED.3IONS-SCNC: 12 MMOL/L (ref 5–15)
AST SERPL-CCNC: 17 U/L (ref 1–32)
BASOPHILS # BLD AUTO: 0.01 10*3/MM3 (ref 0–0.2)
BASOPHILS NFR BLD AUTO: 0.1 % (ref 0–1.5)
BILIRUB SERPL-MCNC: 0.2 MG/DL (ref 0–1.2)
BUN SERPL-MCNC: 24 MG/DL (ref 6–20)
BUN/CREAT SERPL: 25 (ref 7–25)
CALCIUM SPEC-SCNC: 9.3 MG/DL (ref 8.6–10.5)
CHLORIDE SERPL-SCNC: 108 MMOL/L (ref 98–107)
CO2 SERPL-SCNC: 20 MMOL/L (ref 22–29)
CREAT SERPL-MCNC: 0.96 MG/DL (ref 0.57–1)
DEPRECATED RDW RBC AUTO: 42.1 FL (ref 37–54)
EGFRCR SERPLBLD CKD-EPI 2021: 69.6 ML/MIN/1.73
EOSINOPHIL # BLD AUTO: 0.02 10*3/MM3 (ref 0–0.4)
EOSINOPHIL NFR BLD AUTO: 0.3 % (ref 0.3–6.2)
ERYTHROCYTE [DISTWIDTH] IN BLOOD BY AUTOMATED COUNT: 14.6 % (ref 12.3–15.4)
FLUAV SUBTYP SPEC NAA+PROBE: NOT DETECTED
FLUBV RNA ISLT QL NAA+PROBE: NOT DETECTED
GLOBULIN UR ELPH-MCNC: 2.9 GM/DL
GLUCOSE SERPL-MCNC: 99 MG/DL (ref 65–99)
HCT VFR BLD AUTO: 41 % (ref 34–46.6)
HGB BLD-MCNC: 13.3 G/DL (ref 12–15.9)
HOLD SPECIMEN: NORMAL
IMM GRANULOCYTES # BLD AUTO: 0.03 10*3/MM3 (ref 0–0.05)
IMM GRANULOCYTES NFR BLD AUTO: 0.4 % (ref 0–0.5)
LYMPHOCYTES # BLD AUTO: 1.38 10*3/MM3 (ref 0.7–3.1)
LYMPHOCYTES NFR BLD AUTO: 17.3 % (ref 19.6–45.3)
MCH RBC QN AUTO: 25.6 PG (ref 26.6–33)
MCHC RBC AUTO-ENTMCNC: 32.4 G/DL (ref 31.5–35.7)
MCV RBC AUTO: 79 FL (ref 79–97)
MONOCYTES # BLD AUTO: 0.63 10*3/MM3 (ref 0.1–0.9)
MONOCYTES NFR BLD AUTO: 7.9 % (ref 5–12)
NEUTROPHILS NFR BLD AUTO: 5.91 10*3/MM3 (ref 1.7–7)
NEUTROPHILS NFR BLD AUTO: 74 % (ref 42.7–76)
NRBC BLD AUTO-RTO: 0 /100 WBC (ref 0–0.2)
NT-PROBNP SERPL-MCNC: 483.1 PG/ML (ref 0–900)
PLATELET # BLD AUTO: 390 10*3/MM3 (ref 140–450)
PMV BLD AUTO: 9.7 FL (ref 6–12)
POTASSIUM SERPL-SCNC: 4.8 MMOL/L (ref 3.5–5.2)
PROT SERPL-MCNC: 6.7 G/DL (ref 6–8.5)
QT INTERVAL: 396 MS
QTC INTERVAL: 427 MS
RBC # BLD AUTO: 5.19 10*6/MM3 (ref 3.77–5.28)
SARS-COV-2 RNA RESP QL NAA+PROBE: NOT DETECTED
SODIUM SERPL-SCNC: 140 MMOL/L (ref 136–145)
TROPONIN T SERPL HS-MCNC: 22 NG/L
TROPONIN T SERPL HS-MCNC: 25 NG/L
WBC NRBC COR # BLD AUTO: 7.98 10*3/MM3 (ref 3.4–10.8)
WHOLE BLOOD HOLD COAG: NORMAL
WHOLE BLOOD HOLD SPECIMEN: NORMAL

## 2024-03-06 PROCEDURE — 83880 ASSAY OF NATRIURETIC PEPTIDE: CPT | Performed by: EMERGENCY MEDICINE

## 2024-03-06 PROCEDURE — 71045 X-RAY EXAM CHEST 1 VIEW: CPT

## 2024-03-06 PROCEDURE — 73030 X-RAY EXAM OF SHOULDER: CPT

## 2024-03-06 PROCEDURE — 84484 ASSAY OF TROPONIN QUANT: CPT

## 2024-03-06 PROCEDURE — 87636 SARSCOV2 & INF A&B AMP PRB: CPT

## 2024-03-06 PROCEDURE — 36415 COLL VENOUS BLD VENIPUNCTURE: CPT

## 2024-03-06 PROCEDURE — 94640 AIRWAY INHALATION TREATMENT: CPT

## 2024-03-06 PROCEDURE — 93005 ELECTROCARDIOGRAM TRACING: CPT | Performed by: EMERGENCY MEDICINE

## 2024-03-06 PROCEDURE — 80053 COMPREHEN METABOLIC PANEL: CPT | Performed by: EMERGENCY MEDICINE

## 2024-03-06 PROCEDURE — 99284 EMERGENCY DEPT VISIT MOD MDM: CPT

## 2024-03-06 PROCEDURE — 84484 ASSAY OF TROPONIN QUANT: CPT | Performed by: EMERGENCY MEDICINE

## 2024-03-06 PROCEDURE — 85025 COMPLETE CBC W/AUTO DIFF WBC: CPT | Performed by: EMERGENCY MEDICINE

## 2024-03-06 RX ORDER — SODIUM CHLORIDE 0.9 % (FLUSH) 0.9 %
10 SYRINGE (ML) INJECTION AS NEEDED
Status: DISCONTINUED | OUTPATIENT
Start: 2024-03-06 | End: 2024-03-06 | Stop reason: HOSPADM

## 2024-03-06 RX ORDER — IPRATROPIUM BROMIDE AND ALBUTEROL SULFATE 2.5; .5 MG/3ML; MG/3ML
3 SOLUTION RESPIRATORY (INHALATION) ONCE
Status: COMPLETED | OUTPATIENT
Start: 2024-03-06 | End: 2024-03-06

## 2024-03-06 RX ADMIN — IPRATROPIUM BROMIDE AND ALBUTEROL SULFATE 3 ML: 2.5; .5 SOLUTION RESPIRATORY (INHALATION) at 08:23

## 2024-03-06 NOTE — ED PROVIDER NOTES
Subjective   History of Present Illness patient is a 58-year-old female who presents emergency department accompanied by her significant other, reporting she had difficulty breathing that was called this morning by a stressful conversation and/or duration interaction with a coworker while she was at work.  Patient reports she has had previous episodes of difficulty breathing including a recent hospitalization at an outside facility with intubation.  At this time patient is awake, alert, with nonlabored breathing, really denying chest pain, although anxious appearing.  Patient also endorses fatigue, and reports ongoing diuretic prescription usage.    Review of Systems   Constitutional:  Positive for fatigue.   HENT: Negative.     Respiratory:  Positive for shortness of breath.    Cardiovascular:  Positive for chest pain.   Gastrointestinal: Negative.    Genitourinary: Negative.    Musculoskeletal:  Positive for arthralgias.   Skin: Negative.    Neurological:  Positive for weakness.   Psychiatric/Behavioral:  The patient is nervous/anxious.        Past Medical History:   Diagnosis Date    Anxiety     Anxiety     Asthma     CHF (congestive heart failure)     Depression     Depression     Hyperlipidemia     Hypertension        No Known Allergies    Past Surgical History:   Procedure Laterality Date    TUBAL ABDOMINAL LIGATION         Family History   Problem Relation Age of Onset    Diabetes Mother     No Known Problems Father     No Known Problems Sister     No Known Problems Brother     Heart disease Maternal Grandmother     No Known Problems Maternal Grandfather        Social History     Socioeconomic History    Marital status: Single   Tobacco Use    Smoking status: Every Day     Current packs/day: 0.75     Average packs/day: 0.8 packs/day for 20.0 years (15.0 ttl pk-yrs)     Types: Cigarettes     Passive exposure: Current    Smokeless tobacco: Never    Tobacco comments:     Pt states around 3./4 pack sometimes more  due to not drinking alcohol anymore (cessation since 11/2023)   Vaping Use    Vaping status: Never Used   Substance and Sexual Activity    Alcohol use: Not Currently     Comment: reports on 7/21/2021- 1 pint of bourbon/mignon per day since 2011    Drug use: No    Sexual activity: Defer           Objective   Physical Exam  Constitutional:       General: She is not in acute distress.     Appearance: She is well-developed.   HENT:      Head: Normocephalic and atraumatic.   Eyes:      Extraocular Movements: Extraocular movements intact.      Pupils: Pupils are equal, round, and reactive to light.   Cardiovascular:      Rate and Rhythm: Normal rate and regular rhythm.   Pulmonary:      Effort: Tachypnea present.      Breath sounds: Examination of the right-lower field reveals wheezing and rhonchi.   Chest:      Chest wall: No tenderness or crepitus.   Abdominal:      Palpations: Abdomen is soft.   Musculoskeletal:         General: Normal range of motion.      Cervical back: Normal range of motion and neck supple.      Right lower leg: No edema.      Left lower leg: No edema.   Skin:     General: Skin is warm and dry.      Capillary Refill: Capillary refill takes less than 2 seconds.   Neurological:      Mental Status: She is alert and oriented to person, place, and time.      Motor: Weakness present.   Psychiatric:         Mood and Affect: Mood is anxious.         Behavior: Behavior is agitated.         Procedures           ED Course  ED Course as of 03/06/24 1147   Wed Mar 06, 2024   0804 Evaluated patient in ED room 8 [JH]   0808 Initial EKG is in normal sinus rhythm with incomplete right bundle magi block. [JH]   0854 Noted initial serum screening labs, without concern except for the troponin elevation slightly above normal limits, suspected finding associated with myocardial demand from respiratory exacerbation.  Will reevaluate using 2-hour delta rules. [JH]   0927 Noted negative viral swab result. [JH]   1147  Noted downtrending troponin on the 2-hour delta, will communicate results to patient and plan for discharge. [JH]      ED Course User Index  [JH] Jorge Devries, MARTIN                                             Medical Decision Making  Given the patient's presenting symptoms, report of history, as well as events of this morning, differential diagnosis includes COPD exacerbation, anxiety reaction, cannot exclude acute coronary syndrome, congestive heart failure exacerbation, pneumonia, pleural effusion, upper respiratory infection, electrolyte derangement, acute kidney injury.  Patient will have serum screening labs, plain film imaging the chest, twelve-lead ECG, bronchodilator administration, and reevaluation.  Patient is agreeable to plan as explained.  Patient be offered a work excuse for her missed shift today.    Amount and/or Complexity of Data Reviewed  Labs: ordered.  Radiology: ordered.  ECG/medicine tests: ordered.    Risk  Prescription drug management.        Final diagnoses:   Anxiety reaction       ED Disposition  ED Disposition       ED Disposition   Discharge    Condition   Stable    Comment   --               Candace Cam, MARTIN  [address] [place] [id number] [phone]      As needed         Medication List      No changes were made to your prescriptions during this visit.            Jorge Devries, MARTIN  03/06/24 [id number]

## 2024-03-06 NOTE — Clinical Note
[facility] EMERGENCY DEPARTMENT  [address]  [facility] [address]  Phone: [phone]    Jamaica hSell was seen and treated in our emergency department on 3/6/2024.  She may return to work on 03/09/2024.         Thank you for choosing [facility].    Piero Bennett MD

## 2024-03-23 DIAGNOSIS — F41.8 DEPRESSION WITH ANXIETY: ICD-10-CM

## 2024-03-23 DIAGNOSIS — I50.21 ACUTE SYSTOLIC CHF (CONGESTIVE HEART FAILURE): ICD-10-CM

## 2024-03-23 DIAGNOSIS — I10 ESSENTIAL HYPERTENSION: ICD-10-CM

## 2024-03-23 RX ORDER — BUPROPION HYDROCHLORIDE 300 MG/1
300 TABLET ORAL DAILY
Qty: 30 TABLET | Refills: 2 | Status: SHIPPED | OUTPATIENT
Start: 2024-03-23

## 2024-03-23 RX ORDER — FUROSEMIDE 20 MG/1
20 TABLET ORAL DAILY
Qty: 30 TABLET | Refills: 0 | Status: SHIPPED | OUTPATIENT
Start: 2024-03-23

## 2024-03-28 ENCOUNTER — OFFICE VISIT (OUTPATIENT)
Dept: INTERNAL MEDICINE | Facility: CLINIC | Age: 59
End: 2024-03-28
Payer: MEDICAID

## 2024-03-28 VITALS
BODY MASS INDEX: 23.04 KG/M2 | SYSTOLIC BLOOD PRESSURE: 142 MMHG | HEIGHT: 63 IN | WEIGHT: 130 LBS | DIASTOLIC BLOOD PRESSURE: 82 MMHG | HEART RATE: 81 BPM | OXYGEN SATURATION: 97 % | TEMPERATURE: 98.2 F

## 2024-03-28 DIAGNOSIS — M25.512 CHRONIC LEFT SHOULDER PAIN: ICD-10-CM

## 2024-03-28 DIAGNOSIS — M54.2 CERVICAL PAIN: ICD-10-CM

## 2024-03-28 DIAGNOSIS — G89.29 CHRONIC LEFT SHOULDER PAIN: ICD-10-CM

## 2024-03-28 DIAGNOSIS — I10 ESSENTIAL HYPERTENSION: Primary | ICD-10-CM

## 2024-03-28 DIAGNOSIS — R41.3 MEMORY LOSS: ICD-10-CM

## 2024-03-28 DIAGNOSIS — M54.50 LUMBAR PAIN: ICD-10-CM

## 2024-03-28 DIAGNOSIS — F41.8 DEPRESSION WITH ANXIETY: ICD-10-CM

## 2024-03-28 DIAGNOSIS — M25.551 RIGHT HIP PAIN: ICD-10-CM

## 2024-03-28 DIAGNOSIS — N39.46 MIXED STRESS AND URGE URINARY INCONTINENCE: ICD-10-CM

## 2024-03-28 DIAGNOSIS — Z12.4 CERVICAL CANCER SCREENING: ICD-10-CM

## 2024-03-28 DIAGNOSIS — R41.0 CONFUSION: ICD-10-CM

## 2024-03-28 DIAGNOSIS — K21.9 GASTROESOPHAGEAL REFLUX DISEASE WITHOUT ESOPHAGITIS: ICD-10-CM

## 2024-03-28 PROCEDURE — 3077F SYST BP >= 140 MM HG: CPT | Performed by: NURSE PRACTITIONER

## 2024-03-28 PROCEDURE — 99214 OFFICE O/P EST MOD 30 MIN: CPT | Performed by: NURSE PRACTITIONER

## 2024-03-28 PROCEDURE — 3079F DIAST BP 80-89 MM HG: CPT | Performed by: NURSE PRACTITIONER

## 2024-03-28 RX ORDER — ONDANSETRON 4 MG/1
4 TABLET, ORALLY DISINTEGRATING ORAL EVERY 6 HOURS PRN
Qty: 30 TABLET | Refills: 1 | Status: SHIPPED | OUTPATIENT
Start: 2024-03-28

## 2024-03-28 RX ORDER — OXYBUTYNIN CHLORIDE 10 MG/1
10 TABLET, EXTENDED RELEASE ORAL DAILY
Qty: 30 TABLET | Refills: 1 | Status: SHIPPED | OUTPATIENT
Start: 2024-03-28

## 2024-03-28 RX ORDER — OMEPRAZOLE 20 MG/1
20 CAPSULE, DELAYED RELEASE ORAL DAILY
Qty: 30 CAPSULE | Refills: 5 | Status: SHIPPED | OUTPATIENT
Start: 2024-03-28

## 2024-03-28 RX ORDER — BUPROPION HYDROCHLORIDE 150 MG/1
150 TABLET ORAL DAILY
Qty: 30 TABLET | Refills: 1 | Status: SHIPPED | OUTPATIENT
Start: 2024-03-28

## 2024-03-28 NOTE — PROGRESS NOTES
Subjective   Chief Complaint   Patient presents with    Annual Exam      Jamaica Shell is a 58 y.o. female.     The patient is here today for evaluation of multiple medical concerns. She is accompanied by her .    Her  thinks she has frozen shoulder. She has to use her left arm to lift it up. Two days ago, she tried to lift her other arm and it hurt. When she wakes up, she can barely walk. She has pain in her neck. She had x-rays of her arm when she went to the hospital for an asthma attack.    She has trouble walking. Her legs swell, but it is not as bad as it used to be. She has pain in her groin when she walks. Sometimes, she can not get up. It feels like a Charley horse. She has pain in her lower back, but it is not keeping her from walking. She takes ibuprofen 800 mg, which sometimes helps. She had a tubal ligation a long time ago.    Her pills make her nauseated. She takes Lasix and spironolactone. She has urinary incontinence. She has not tried anything for it. She prefers female provider. She has not had a Pap smear. She is almost 6 months clean. She is on Phenergan.    She is inquiring about disability.    She has not been checking her blood pressure. She has been having headaches. She is drinking more caffeine now. She is having trouble sleeping.    She has trouble with her appetite. She does not eat in the morning. She snacks on crackers or nutrition drinks. She denies heartburn, stomach upset, or indigestion. She has nausea in the morning when she takes her pills. She is not taking anything for nausea. She denies abdominal pain. She has regular bowel movements. She has diarrhea every morning. She is on duloxetine and Wellbutrin.    The patient presents for evaluation of multiple medical concerns..    Marie - .     I have reviewed the following portions of the patient's history and confirmed they are accurate: allergies, current medications, past family history, past  medical history, past social history, past surgical history, and problem list    Review of Systems  Pertinent items are noted in HPI.     Current Outpatient Medications on File Prior to Visit   Medication Sig    albuterol sulfate HFA (Ventolin HFA) 108 (90 Base) MCG/ACT inhaler INHALE TWO PUFFS BY MOUTH EVERY 4 TO 6 HOURS AS NEEDED FOR COUGH ,  SHORTNESS OF BREATH , OR WHEEZING    atorvastatin (Lipitor) 80 MG tablet Take 1 tablet by mouth Daily.    bisoprolol (ZEBeta) 10 MG tablet Take 1 tablet by mouth Daily.    Diclofenac Sodium (VOLTAREN) 1 % gel gel APPLY ONE GRAM TOPICALLY TO INDICATED AREA FOUR TIMES A DAY AS NEEDED FOR MILD TO MODERATE PAIN    empagliflozin (Jardiance) 10 MG tablet tablet Take 1 tablet by mouth Daily.    FeroSul 325 (65 Fe) MG tablet TAKE ONE TABLET BY MOUTH DAILY WITH BREAKFAST    FLUoxetine (PROzac) 20 MG capsule Take 3 capsules by mouth Daily.    folic acid (FOLVITE) 1 MG tablet Take 1 tablet by mouth Daily.    furosemide (LASIX) 20 MG tablet TAKE 1 TABLET BY MOUTH DAILY    ibuprofen (ADVIL,MOTRIN) 800 MG tablet Take 1 tablet by mouth Every 6 (Six) Hours As Needed for Mild Pain or Moderate Pain (Take with food.).    loratadine (CLARITIN) 10 MG tablet Take 1 tablet by mouth Daily.    methocarbamol (ROBAXIN) 750 MG tablet Take 1/2-1 tablet by mouth 4 times daily as needed for muscle spasms.    montelukast (SINGULAIR) 10 MG tablet TAKE ONE TABLET BY MOUTH ONCE NIGHTLY    naltrexone (DEPADE) 50 MG tablet Take 1 tablet by mouth Daily.    nicotine (Nicoderm CQ) 21 MG/24HR patch Place 1 patch on the skin as directed by provider Daily.    potassium chloride ER (K-TAB) 20 MEQ tablet controlled-release ER tablet TAKE 1 TABLET BY MOUTH DAILY    sacubitril-valsartan (ENTRESTO)  MG tablet Take 1 tablet by mouth 2 (Two) Times a Day.    spironolactone (Aldactone) 25 MG tablet Take 1 tablet by mouth Daily.    traZODone (DESYREL) 50 MG tablet TAKE 1 TO 2 TABLETS BY MOUTH 1 HOUR BEFORE BEDTIME AS  "NEEDED FOR SLEEP    [DISCONTINUED] buPROPion XL (WELLBUTRIN XL) 300 MG 24 hr tablet TAKE 1 TABLET BY MOUTH DAILY     No current facility-administered medications on file prior to visit.       Objective   Vitals:    03/28/24 1003 03/28/24 1045   BP: 150/92 142/82   BP Location: Left arm    Patient Position: Sitting    Pulse: 81    Temp: 98.2 °F (36.8 °C)    TempSrc: Temporal    SpO2: 97%    Weight: 59 kg (130 lb)    Height: 160 cm (63\")      Body mass index is 23.03 kg/m².    Physical Exam  Vitals reviewed.   Constitutional:       Appearance: Normal appearance. She is well-developed.   HENT:      Head: Normocephalic and atraumatic.      Nose: Nose normal.   Eyes:      General: Lids are normal.      Conjunctiva/sclera: Conjunctivae normal.      Pupils: Pupils are equal, round, and reactive to light.   Neck:      Thyroid: No thyromegaly.      Trachea: Trachea normal.   Cardiovascular:      Rate and Rhythm: Normal rate and regular rhythm.      Heart sounds: Normal heart sounds.   Pulmonary:      Effort: Pulmonary effort is normal. No respiratory distress.      Breath sounds: Normal breath sounds.   Musculoskeletal:      Left shoulder: Tenderness present. Decreased range of motion.      Cervical back: Tenderness present.      Lumbar back: Tenderness present.      Right hip: Tenderness present.   Skin:     General: Skin is warm and dry.   Neurological:      Mental Status: She is alert and oriented to person, place, and time.      GCS: GCS eye subscore is 4. GCS verbal subscore is 5. GCS motor subscore is 6.   Psychiatric:         Attention and Perception: Attention normal.         Mood and Affect: Mood normal.         Speech: Speech normal.         Behavior: Behavior normal. Behavior is cooperative.         Thought Content: Thought content normal.         Assessment & Plan   Problem List Items Addressed This Visit       Essential hypertension - Primary    Depression with anxiety    Relevant Medications    Cariprazine HCl " (VRAYLAR) 1.5 MG capsule capsule    buPROPion XL (Wellbutrin XL) 150 MG 24 hr tablet    Memory loss     Other Visit Diagnoses       Chronic left shoulder pain        Mixed stress and urge urinary incontinence        Relevant Medications    oxybutynin XL (Ditropan XL) 10 MG 24 hr tablet    Cervical cancer screening        Relevant Orders    XR Hip With or Without Pelvis 2 - 3 View Right    Ambulatory Referral to Obstetrics / Gynecology (Completed)    Lumbar pain        Relevant Orders    XR Spine Lumbar Complete 4+VW    Cervical pain        Relevant Orders    XR Spine Cervical Complete 4 or 5 View    Right hip pain        Relevant Orders    XR Hip With or Without Pelvis 2 - 3 View Right    Gastroesophageal reflux disease without esophagitis        Relevant Medications    omeprazole (priLOSEC) 20 MG capsule           Laboratory Studies  Labs from 03/2024 were reviewed with the patient.    Imaging  X-ray of the left shoulder was reviewed with the patient.    1. Hypertension.  This is chronic, unstable.   The patient will increase dose of Lasix back up to 20 mg.   She will continue spironolactone, Entresto, and bisoprolol.   She will follow a heart healthy, low cholesterol, fiber diet.   She will continue potassium with Lasix.   I will check fasting lipid panel and CMP at next appointment.    2. Memory loss:  This is chronic, unstable.   She is completing brain MRI.   I am referring to neurology due to past abnormal brain MRI and CT scan showing some brain atrophy.    3. Chronic left shoulder pain and cervical pain:  This is chronic, unstable.   I am ordering cervical x-ray. She will continue ibuprofen and Tylenol as needed.   Advised the patient to use ibuprofen sparingly due to stomach upset.   She will continue Robaxin as needed.    4. Right hip and lumbar pain:  This is chronic, unstable.   I am ordering right hip and lumbar x-ray.   She will continue ibuprofen and Tylenol as needed.    5. Depression with  anxiety.  This is chronic, unstable.   She will continue fluoxetine.   I will decrease Wellbutrin and start Vraylar.    6. Mixed stress and urge incontinence:  This is chronic, unstable.   She will start oxybutynin.   I am referring her to gynecology for cervical cancer screening and pelvic exam.   Consider referral to urology.    8. GERD.  This is chronic, unstable   She will start omeprazole and Zofran.        Current Outpatient Medications:     albuterol sulfate HFA (Ventolin HFA) 108 (90 Base) MCG/ACT inhaler, INHALE TWO PUFFS BY MOUTH EVERY 4 TO 6 HOURS AS NEEDED FOR COUGH ,  SHORTNESS OF BREATH , OR WHEEZING, Disp: 18 g, Rfl: 0    atorvastatin (Lipitor) 80 MG tablet, Take 1 tablet by mouth Daily., Disp: 30 tablet, Rfl: 5    bisoprolol (ZEBeta) 10 MG tablet, Take 1 tablet by mouth Daily., Disp: 90 tablet, Rfl: 3    Diclofenac Sodium (VOLTAREN) 1 % gel gel, APPLY ONE GRAM TOPICALLY TO INDICATED AREA FOUR TIMES A DAY AS NEEDED FOR MILD TO MODERATE PAIN, Disp: 100 g, Rfl: 2    empagliflozin (Jardiance) 10 MG tablet tablet, Take 1 tablet by mouth Daily., Disp: 30 tablet, Rfl: 5    FeroSul 325 (65 Fe) MG tablet, TAKE ONE TABLET BY MOUTH DAILY WITH BREAKFAST, Disp: 30 tablet, Rfl: 5    FLUoxetine (PROzac) 20 MG capsule, Take 3 capsules by mouth Daily., Disp: 90 capsule, Rfl: 5    folic acid (FOLVITE) 1 MG tablet, Take 1 tablet by mouth Daily., Disp: 90 tablet, Rfl: 3    furosemide (LASIX) 20 MG tablet, TAKE 1 TABLET BY MOUTH DAILY, Disp: 30 tablet, Rfl: 0    ibuprofen (ADVIL,MOTRIN) 800 MG tablet, Take 1 tablet by mouth Every 6 (Six) Hours As Needed for Mild Pain or Moderate Pain (Take with food.)., Disp: 60 tablet, Rfl: 0    loratadine (CLARITIN) 10 MG tablet, Take 1 tablet by mouth Daily., Disp: 30 tablet, Rfl: 5    methocarbamol (ROBAXIN) 750 MG tablet, Take 1/2-1 tablet by mouth 4 times daily as needed for muscle spasms., Disp: 60 tablet, Rfl: 2    montelukast (SINGULAIR) 10 MG tablet, TAKE ONE TABLET BY MOUTH  ONCE NIGHTLY, Disp: 90 tablet, Rfl: 1    naltrexone (DEPADE) 50 MG tablet, Take 1 tablet by mouth Daily., Disp: 30 tablet, Rfl: 5    nicotine (Nicoderm CQ) 21 MG/24HR patch, Place 1 patch on the skin as directed by provider Daily., Disp: 28 each, Rfl: 1    potassium chloride ER (K-TAB) 20 MEQ tablet controlled-release ER tablet, TAKE 1 TABLET BY MOUTH DAILY, Disp: 90 tablet, Rfl: 1    sacubitril-valsartan (ENTRESTO)  MG tablet, Take 1 tablet by mouth 2 (Two) Times a Day., Disp: 60 tablet, Rfl: 6    spironolactone (Aldactone) 25 MG tablet, Take 1 tablet by mouth Daily., Disp: 90 tablet, Rfl: 3    traZODone (DESYREL) 50 MG tablet, TAKE 1 TO 2 TABLETS BY MOUTH 1 HOUR BEFORE BEDTIME AS NEEDED FOR SLEEP, Disp: 45 tablet, Rfl: 2    buPROPion XL (Wellbutrin XL) 150 MG 24 hr tablet, Take 1 tablet by mouth Daily., Disp: 30 tablet, Rfl: 1    Cariprazine HCl (VRAYLAR) 1.5 MG capsule capsule, Take 1 capsule by mouth Daily., Disp: 30 capsule, Rfl: 2    omeprazole (priLOSEC) 20 MG capsule, Take 1 capsule by mouth Daily., Disp: 30 capsule, Rfl: 5    ondansetron ODT (ZOFRAN-ODT) 4 MG disintegrating tablet, Place 1 tablet on the tongue Every 6 (Six) Hours As Needed for Nausea or Vomiting., Disp: 30 tablet, Rfl: 1    oxybutynin XL (Ditropan XL) 10 MG 24 hr tablet, Take 1 tablet by mouth Daily., Disp: 30 tablet, Rfl: 1       Plan of care reviewed with the patient at the conclusion of today's visit.  Education was provided regarding diagnosis, management, and any prescribed or recommended OTC medications.  Patient verbalized understanding of and agreement with management plan.     Return in about 1 month (around 4/28/2024), or if symptoms worsen or fail to improve.      Transcribed from ambient dictation for MARTIN Ramirez by Roger Mathews.  03/28/24   10:19 EDT    Patient or patient representative verbalized consent to the visit recording.  I have personally performed the services described in this document as  transcribed by the above individual, and it is both accurate and complete.

## 2024-04-15 ENCOUNTER — TELEPHONE (OUTPATIENT)
Dept: CARDIOLOGY | Facility: CLINIC | Age: 59
End: 2024-04-15

## 2024-04-15 NOTE — TELEPHONE ENCOUNTER
Caller: Jamaica Shell    Relationship to patient: Self    Best call back number: [phone]    Type of visit: FOLLOW UP     Requested date: ASAP     If rescheduling, when is the original appointment: 04/15/24     Additional notes: PATIENT CALLED IN TO RESCHEDULE HER APPOINTMENT. PATIENT WOULD LIKE TO GET ASAP TO RESCHEDULE HER APPOINTMENT.

## 2024-04-21 DIAGNOSIS — I50.21 ACUTE SYSTOLIC CHF (CONGESTIVE HEART FAILURE): ICD-10-CM

## 2024-04-21 DIAGNOSIS — I10 ESSENTIAL HYPERTENSION: ICD-10-CM

## 2024-04-22 RX ORDER — FUROSEMIDE 20 MG/1
20 TABLET ORAL DAILY
Qty: 30 TABLET | Refills: 0 | Status: SHIPPED | OUTPATIENT
Start: 2024-04-22

## 2024-04-23 DIAGNOSIS — J30.89 ENVIRONMENTAL AND SEASONAL ALLERGIES: ICD-10-CM

## 2024-04-23 RX ORDER — MONTELUKAST SODIUM 10 MG/1
10 TABLET ORAL NIGHTLY
Qty: 90 TABLET | Refills: 1 | Status: SHIPPED | OUTPATIENT
Start: 2024-04-23

## 2024-05-06 ENCOUNTER — OFFICE VISIT (OUTPATIENT)
Dept: INTERNAL MEDICINE | Facility: CLINIC | Age: 59
End: 2024-05-06
Payer: MEDICAID

## 2024-05-06 ENCOUNTER — LAB (OUTPATIENT)
Dept: INTERNAL MEDICINE | Facility: CLINIC | Age: 59
End: 2024-05-06
Payer: MEDICAID

## 2024-05-06 VITALS
HEIGHT: 63 IN | OXYGEN SATURATION: 98 % | WEIGHT: 127 LBS | BODY MASS INDEX: 22.5 KG/M2 | DIASTOLIC BLOOD PRESSURE: 90 MMHG | HEART RATE: 58 BPM | SYSTOLIC BLOOD PRESSURE: 168 MMHG

## 2024-05-06 DIAGNOSIS — Z13.29 THYROID DISORDER SCREENING: ICD-10-CM

## 2024-05-06 DIAGNOSIS — M25.512 CHRONIC LEFT SHOULDER PAIN: ICD-10-CM

## 2024-05-06 DIAGNOSIS — E55.9 VITAMIN D DEFICIENCY: ICD-10-CM

## 2024-05-06 DIAGNOSIS — Z13.21 ENCOUNTER FOR VITAMIN DEFICIENCY SCREENING: ICD-10-CM

## 2024-05-06 DIAGNOSIS — G89.29 CHRONIC LEFT SHOULDER PAIN: ICD-10-CM

## 2024-05-06 DIAGNOSIS — I10 ESSENTIAL HYPERTENSION: Primary | ICD-10-CM

## 2024-05-06 DIAGNOSIS — Z13.1 SCREENING FOR DIABETES MELLITUS: ICD-10-CM

## 2024-05-06 DIAGNOSIS — M25.551 RIGHT HIP PAIN: ICD-10-CM

## 2024-05-06 DIAGNOSIS — F41.8 DEPRESSION WITH ANXIETY: Primary | ICD-10-CM

## 2024-05-06 DIAGNOSIS — Z13.0 SCREENING FOR BLOOD DISEASE: ICD-10-CM

## 2024-05-06 DIAGNOSIS — M54.50 LUMBAR PAIN: ICD-10-CM

## 2024-05-06 DIAGNOSIS — I10 ESSENTIAL HYPERTENSION: ICD-10-CM

## 2024-05-06 DIAGNOSIS — Z13.220 LIPID SCREENING: ICD-10-CM

## 2024-05-06 LAB
DEPRECATED RDW RBC AUTO: 40.5 FL (ref 37–54)
ERYTHROCYTE [DISTWIDTH] IN BLOOD BY AUTOMATED COUNT: 14.9 % (ref 12.3–15.4)
HCT VFR BLD AUTO: 40.3 % (ref 34–46.6)
HGB BLD-MCNC: 12.8 G/DL (ref 12–15.9)
MCH RBC QN AUTO: 24.4 PG (ref 26.6–33)
MCHC RBC AUTO-ENTMCNC: 31.8 G/DL (ref 31.5–35.7)
MCV RBC AUTO: 76.8 FL (ref 79–97)
PLATELET # BLD AUTO: 335 10*3/MM3 (ref 140–450)
PMV BLD AUTO: 9.8 FL (ref 6–12)
RBC # BLD AUTO: 5.25 10*6/MM3 (ref 3.77–5.28)
WBC NRBC COR # BLD AUTO: 7.38 10*3/MM3 (ref 3.4–10.8)

## 2024-05-06 PROCEDURE — 82607 VITAMIN B-12: CPT | Performed by: NURSE PRACTITIONER

## 2024-05-06 PROCEDURE — 3080F DIAST BP >= 90 MM HG: CPT | Performed by: NURSE PRACTITIONER

## 2024-05-06 PROCEDURE — 1159F MED LIST DOCD IN RCRD: CPT | Performed by: NURSE PRACTITIONER

## 2024-05-06 PROCEDURE — 1125F AMNT PAIN NOTED PAIN PRSNT: CPT | Performed by: NURSE PRACTITIONER

## 2024-05-06 PROCEDURE — 1160F RVW MEDS BY RX/DR IN RCRD: CPT | Performed by: NURSE PRACTITIONER

## 2024-05-06 PROCEDURE — 80061 LIPID PANEL: CPT | Performed by: NURSE PRACTITIONER

## 2024-05-06 PROCEDURE — 83036 HEMOGLOBIN GLYCOSYLATED A1C: CPT | Performed by: NURSE PRACTITIONER

## 2024-05-06 PROCEDURE — 36415 COLL VENOUS BLD VENIPUNCTURE: CPT | Performed by: NURSE PRACTITIONER

## 2024-05-06 PROCEDURE — 99214 OFFICE O/P EST MOD 30 MIN: CPT | Performed by: NURSE PRACTITIONER

## 2024-05-06 PROCEDURE — 82746 ASSAY OF FOLIC ACID SERUM: CPT | Performed by: NURSE PRACTITIONER

## 2024-05-06 PROCEDURE — 3077F SYST BP >= 140 MM HG: CPT | Performed by: NURSE PRACTITIONER

## 2024-05-06 PROCEDURE — 80050 GENERAL HEALTH PANEL: CPT | Performed by: NURSE PRACTITIONER

## 2024-05-06 PROCEDURE — 82306 VITAMIN D 25 HYDROXY: CPT | Performed by: NURSE PRACTITIONER

## 2024-05-06 RX ORDER — AMLODIPINE BESYLATE 5 MG/1
5 TABLET ORAL DAILY
Qty: 30 TABLET | Refills: 1 | Status: SHIPPED | OUTPATIENT
Start: 2024-05-06

## 2024-05-06 RX ORDER — BUPROPION HYDROCHLORIDE 300 MG/1
300 TABLET ORAL DAILY
Qty: 30 TABLET | Refills: 1 | Status: SHIPPED | OUTPATIENT
Start: 2024-05-06

## 2024-05-06 RX ORDER — DICLOFENAC SODIUM 75 MG/1
75 TABLET, DELAYED RELEASE ORAL 2 TIMES DAILY PRN
Qty: 60 TABLET | Refills: 5 | Status: SHIPPED | OUTPATIENT
Start: 2024-05-06

## 2024-05-06 NOTE — PROGRESS NOTES
Subjective   Chief Complaint   Patient presents with    Hypertension      Jamaica Shell is a 58 y.o. female.     The patient is here today for a follow-up. She is accompanied by an adult female, close friend.    She is experiencing heightened stress at work, characterized by heightened emotional sensitivity, instability in her feet, and a recent fall at work. She expresses a reluctance to return to her previous job and requests a work excuse note. Her current medication regimen includes Vraylar, Wellbutrin 150 mg, and fluoxetine. She expresses uncertainty regarding the efficacy of Vraylar, attributing her emotional state to multiple medications. She reports a lack of interest in activities. She admits to smoking marijuana in the past but has now discontinued the use of marijuana.     The adult female notes that the patient's gait is unstable, as evidenced by a recent fall at work. Her hips are causing significant discomfort today on 5/6/2024, to the extent that she is barely able to stand. She continues to experience pain in her hips, knees, and groin, along with lower back pain. She requests a refill of ibuprofen 800 mg, which provides some relief but does not completely alleviate her pain. She has not yet tried diclofenac or Celebrex. An x-ray of her neck, lower back, and hips were conducted in 03/2024. She also reports issues with her neck.    She continues to experience swelling in her ankle. The accompanying adult male notes that the patient has fallen 2 to 3 times. She experiences pain upon rising from a seated position, particularly during work, and during foot range of motion.    She is experiencing difficulty with her memory. She has not undergone a brain MRI.      I have reviewed the following portions of the patient's history and confirmed they are accurate: allergies, current medications, past family history, past medical history, past social history, past surgical history, and problem  list    Review of Systems  Pertinent items are noted in HPI.     Current Outpatient Medications on File Prior to Visit   Medication Sig    albuterol sulfate HFA (Ventolin HFA) 108 (90 Base) MCG/ACT inhaler INHALE TWO PUFFS BY MOUTH EVERY 4 TO 6 HOURS AS NEEDED FOR COUGH ,  SHORTNESS OF BREATH , OR WHEEZING    atorvastatin (Lipitor) 80 MG tablet Take 1 tablet by mouth Daily.    bisoprolol (ZEBeta) 10 MG tablet Take 1 tablet by mouth Daily.    Diclofenac Sodium (VOLTAREN) 1 % gel gel APPLY ONE GRAM TOPICALLY TO INDICATED AREA FOUR TIMES A DAY AS NEEDED FOR MILD TO MODERATE PAIN    empagliflozin (Jardiance) 10 MG tablet tablet Take 1 tablet by mouth Daily.    FeroSul 325 (65 Fe) MG tablet TAKE ONE TABLET BY MOUTH DAILY WITH BREAKFAST    folic acid (FOLVITE) 1 MG tablet Take 1 tablet by mouth Daily.    furosemide (LASIX) 20 MG tablet TAKE 1 TABLET BY MOUTH DAILY    loratadine (CLARITIN) 10 MG tablet Take 1 tablet by mouth Daily.    methocarbamol (ROBAXIN) 750 MG tablet Take 1/2-1 tablet by mouth 4 times daily as needed for muscle spasms.    montelukast (SINGULAIR) 10 MG tablet TAKE ONE TABLET BY MOUTH ONCE NIGHTLY    naltrexone (DEPADE) 50 MG tablet Take 1 tablet by mouth Daily.    nicotine (Nicoderm CQ) 21 MG/24HR patch Place 1 patch on the skin as directed by provider Daily.    omeprazole (priLOSEC) 20 MG capsule Take 1 capsule by mouth Daily.    ondansetron ODT (ZOFRAN-ODT) 4 MG disintegrating tablet Place 1 tablet on the tongue Every 6 (Six) Hours As Needed for Nausea or Vomiting.    oxybutynin XL (Ditropan XL) 10 MG 24 hr tablet Take 1 tablet by mouth Daily.    potassium chloride ER (K-TAB) 20 MEQ tablet controlled-release ER tablet TAKE 1 TABLET BY MOUTH DAILY    sacubitril-valsartan (ENTRESTO)  MG tablet Take 1 tablet by mouth 2 (Two) Times a Day.    spironolactone (Aldactone) 25 MG tablet Take 1 tablet by mouth Daily.    traZODone (DESYREL) 50 MG tablet TAKE 1 TO 2 TABLETS BY MOUTH 1 HOUR BEFORE BEDTIME AS  "NEEDED FOR SLEEP     No current facility-administered medications on file prior to visit.       Objective   Vitals:    05/06/24 0921   BP: 168/90   BP Location: Left arm   Patient Position: Sitting   Pulse: 58   SpO2: 98%   Weight: 57.6 kg (127 lb)   Height: 160 cm (62.99\")     Body mass index is 22.5 kg/m².    Physical Exam  Vitals reviewed.   Constitutional:       Appearance: Normal appearance. She is well-developed.   HENT:      Head: Normocephalic and atraumatic.      Nose: Nose normal.   Eyes:      General: Lids are normal.      Conjunctiva/sclera: Conjunctivae normal.      Pupils: Pupils are equal, round, and reactive to light.   Neck:      Thyroid: No thyromegaly.      Trachea: Trachea normal.   Cardiovascular:      Rate and Rhythm: Normal rate and regular rhythm.      Heart sounds: Normal heart sounds.   Pulmonary:      Effort: Pulmonary effort is normal. No respiratory distress.      Breath sounds: Normal breath sounds.   Musculoskeletal:      Left shoulder: Tenderness present.      Lumbar back: Tenderness present.      Right hip: Tenderness present.      Left knee: Tenderness present.   Skin:     General: Skin is warm and dry.   Neurological:      Mental Status: She is alert and oriented to person, place, and time.      GCS: GCS eye subscore is 4. GCS verbal subscore is 5. GCS motor subscore is 6.   Psychiatric:         Attention and Perception: Attention normal.         Mood and Affect: Mood normal.         Speech: Speech normal.         Behavior: Behavior normal. Behavior is cooperative.         Thought Content: Thought content normal.           Imaging  X-ray of left knee and lower leg show old healed fractures in the third and fourth toes and significant arthritis in the foot. X-ray of left shoulder shows mild arthritis.    Assessment & Plan   Problem List Items Addressed This Visit       Essential hypertension    Relevant Medications    amLODIPine (NORVASC) 5 MG tablet    Depression with anxiety - " Primary    Relevant Medications    buPROPion XL (Wellbutrin XL) 300 MG 24 hr tablet    Vitamin D deficiency    Relevant Orders    Vitamin D,25-Hydroxy (Completed)     Other Visit Diagnoses       Right hip pain        Relevant Orders    XR Hip With or Without Pelvis 2 - 3 View Right    Lumbar pain        Relevant Orders    XR Spine Lumbar Complete 4+VW    Chronic left shoulder pain        Relevant Orders    XR Spine Cervical Complete 4 or 5 View    Screening for blood disease        Relevant Medications    amLODIPine (NORVASC) 5 MG tablet    Other Relevant Orders    CBC (No Diff) (Completed)    Comprehensive Metabolic Panel (Completed)    Lipid Panel (Completed)    Hemoglobin A1c (Completed)    TSH Rfx On Abnormal To Free T4 (Completed)    Vitamin B12 & Folate (Completed)    Vitamin D,25-Hydroxy (Completed)    Thyroid disorder screening        Relevant Orders    TSH Rfx On Abnormal To Free T4 (Completed)    Lipid screening        Relevant Orders    Lipid Panel (Completed)    Encounter for vitamin deficiency screening        Relevant Orders    Vitamin B12 & Folate (Completed)    Vitamin D,25-Hydroxy (Completed)    Screening for diabetes mellitus        Relevant Orders    Hemoglobin A1c (Completed)         PLAN    Depression with anxiety- Chronic, unstable.  Increase Wellbutrin.   Continue fluoxetine.   Discontinue Vraylar.   Follow up in 2 weeks.     Hypertension- Chronic, unstable.   Start amlodipine.  Follow heart healthy, low cholesterol, high fiber diet.   Checking fasting lipid panel, CMP.   Follow up in 2 weeks.     Right hip pain, lumbar pain and chronic left shoulder pain- Chronic, unstable.   X-rays ordered.  Start diclofenac as needed.   Discontinue ibuprofen.   We will refer to specialist or get further imaging based on results.      Current Outpatient Medications:     albuterol sulfate HFA (Ventolin HFA) 108 (90 Base) MCG/ACT inhaler, INHALE TWO PUFFS BY MOUTH EVERY 4 TO 6 HOURS AS NEEDED FOR COUGH ,   SHORTNESS OF BREATH , OR WHEEZING, Disp: 18 g, Rfl: 0    amLODIPine (NORVASC) 5 MG tablet, Take 1 tablet by mouth Daily., Disp: 30 tablet, Rfl: 1    atorvastatin (Lipitor) 80 MG tablet, Take 1 tablet by mouth Daily., Disp: 30 tablet, Rfl: 5    bisoprolol (ZEBeta) 10 MG tablet, Take 1 tablet by mouth Daily., Disp: 90 tablet, Rfl: 3    buPROPion XL (Wellbutrin XL) 300 MG 24 hr tablet, Take 1 tablet by mouth Daily., Disp: 30 tablet, Rfl: 1    diclofenac (VOLTAREN) 75 MG EC tablet, Take 1 tablet by mouth 2 (Two) Times a Day As Needed (moderate pain). Take with food., Disp: 60 tablet, Rfl: 5    Diclofenac Sodium (VOLTAREN) 1 % gel gel, APPLY ONE GRAM TOPICALLY TO INDICATED AREA FOUR TIMES A DAY AS NEEDED FOR MILD TO MODERATE PAIN, Disp: 100 g, Rfl: 2    empagliflozin (Jardiance) 10 MG tablet tablet, Take 1 tablet by mouth Daily., Disp: 30 tablet, Rfl: 5    FeroSul 325 (65 Fe) MG tablet, TAKE ONE TABLET BY MOUTH DAILY WITH BREAKFAST, Disp: 30 tablet, Rfl: 5    FLUoxetine (PROzac) 20 MG capsule, TAKE THREE CAPSULES BY MOUTH DAILY, Disp: 90 capsule, Rfl: 1    folic acid (FOLVITE) 1 MG tablet, Take 1 tablet by mouth Daily., Disp: 90 tablet, Rfl: 3    furosemide (LASIX) 20 MG tablet, TAKE 1 TABLET BY MOUTH DAILY, Disp: 30 tablet, Rfl: 0    loratadine (CLARITIN) 10 MG tablet, Take 1 tablet by mouth Daily., Disp: 30 tablet, Rfl: 5    methocarbamol (ROBAXIN) 750 MG tablet, Take 1/2-1 tablet by mouth 4 times daily as needed for muscle spasms., Disp: 60 tablet, Rfl: 2    montelukast (SINGULAIR) 10 MG tablet, TAKE ONE TABLET BY MOUTH ONCE NIGHTLY, Disp: 90 tablet, Rfl: 1    naltrexone (DEPADE) 50 MG tablet, Take 1 tablet by mouth Daily., Disp: 30 tablet, Rfl: 5    nicotine (Nicoderm CQ) 21 MG/24HR patch, Place 1 patch on the skin as directed by provider Daily., Disp: 28 each, Rfl: 1    omeprazole (priLOSEC) 20 MG capsule, Take 1 capsule by mouth Daily., Disp: 30 capsule, Rfl: 5    ondansetron ODT (ZOFRAN-ODT) 4 MG disintegrating  tablet, Place 1 tablet on the tongue Every 6 (Six) Hours As Needed for Nausea or Vomiting., Disp: 30 tablet, Rfl: 1    oxybutynin XL (Ditropan XL) 10 MG 24 hr tablet, Take 1 tablet by mouth Daily., Disp: 30 tablet, Rfl: 1    potassium chloride ER (K-TAB) 20 MEQ tablet controlled-release ER tablet, TAKE 1 TABLET BY MOUTH DAILY, Disp: 90 tablet, Rfl: 1    sacubitril-valsartan (ENTRESTO)  MG tablet, Take 1 tablet by mouth 2 (Two) Times a Day., Disp: 60 tablet, Rfl: 6    spironolactone (Aldactone) 25 MG tablet, Take 1 tablet by mouth Daily., Disp: 90 tablet, Rfl: 3    traZODone (DESYREL) 50 MG tablet, TAKE 1 TO 2 TABLETS BY MOUTH 1 HOUR BEFORE BEDTIME AS NEEDED FOR SLEEP, Disp: 45 tablet, Rfl: 2       Plan of care reviewed with the patient at the conclusion of today's visit.  Education was provided regarding diagnosis, management, and any prescribed or recommended OTC medications.  Patient verbalized understanding of and agreement with management plan.     Return in about 2 weeks (around 5/20/2024), or if symptoms worsen or fail to improve, for Follow-up.      Transcribed from ambient dictation for MARTIN Ramirez by Kimi Luna.  05/06/24   10:04 EDT    Patient or patient representative verbalized consent to the visit recording.  I have personally performed the services described in this document as transcribed by the above individual, and it is both accurate and complete.

## 2024-05-07 LAB
25(OH)D3 SERPL-MCNC: 34.9 NG/ML (ref 30–100)
ALBUMIN SERPL-MCNC: 3.8 G/DL (ref 3.5–5.2)
ALBUMIN/GLOB SERPL: 1.7 G/DL
ALP SERPL-CCNC: 81 U/L (ref 39–117)
ALT SERPL W P-5'-P-CCNC: 22 U/L (ref 1–33)
ANION GAP SERPL CALCULATED.3IONS-SCNC: 12.4 MMOL/L (ref 5–15)
AST SERPL-CCNC: 16 U/L (ref 1–32)
BILIRUB SERPL-MCNC: <0.2 MG/DL (ref 0–1.2)
BUN SERPL-MCNC: 13 MG/DL (ref 6–20)
BUN/CREAT SERPL: 15.3 (ref 7–25)
CALCIUM SPEC-SCNC: 9.4 MG/DL (ref 8.6–10.5)
CHLORIDE SERPL-SCNC: 109 MMOL/L (ref 98–107)
CHOLEST SERPL-MCNC: 146 MG/DL (ref 0–200)
CO2 SERPL-SCNC: 20.6 MMOL/L (ref 22–29)
CREAT SERPL-MCNC: 0.85 MG/DL (ref 0.57–1)
EGFRCR SERPLBLD CKD-EPI 2021: 80.5 ML/MIN/1.73
FOLATE SERPL-MCNC: >20 NG/ML (ref 4.78–24.2)
GLOBULIN UR ELPH-MCNC: 2.3 GM/DL
GLUCOSE SERPL-MCNC: 86 MG/DL (ref 65–99)
HBA1C MFR BLD: 6 % (ref 4.8–5.6)
HDLC SERPL-MCNC: 49 MG/DL (ref 40–60)
LDLC SERPL CALC-MCNC: 75 MG/DL (ref 0–100)
LDLC/HDLC SERPL: 1.48 {RATIO}
POTASSIUM SERPL-SCNC: 4.1 MMOL/L (ref 3.5–5.2)
PROT SERPL-MCNC: 6.1 G/DL (ref 6–8.5)
SODIUM SERPL-SCNC: 142 MMOL/L (ref 136–145)
TRIGL SERPL-MCNC: 122 MG/DL (ref 0–150)
TSH SERPL DL<=0.05 MIU/L-ACNC: 0.96 UIU/ML (ref 0.27–4.2)
VIT B12 BLD-MCNC: 628 PG/ML (ref 211–946)
VLDLC SERPL-MCNC: 22 MG/DL (ref 5–40)

## 2024-05-14 RX ORDER — FLUOXETINE HYDROCHLORIDE 20 MG/1
60 CAPSULE ORAL DAILY
Qty: 90 CAPSULE | Refills: 1 | Status: SHIPPED | OUTPATIENT
Start: 2024-05-14

## 2024-05-20 ENCOUNTER — TELEPHONE (OUTPATIENT)
Dept: INTERNAL MEDICINE | Facility: CLINIC | Age: 59
End: 2024-05-20

## 2024-05-20 NOTE — TELEPHONE ENCOUNTER
Caller: Jamaica Shell    Relationship: Self    Best call back number: [phone]    What form or medical record are you requesting: RETURN TO WORK    Who is requesting this form or medical record from you:    EMPLOYER    Additional notes:     PATIENT WAS IN TODAY FOR AN APPOINTMENT.  HOWEVER, SHE COULD NOT WAIT BECAUSE HER TRANSPORTATION HAD SOMETHING TO DO.     PATIENT'S EMPLOYER WILL NOT LET HER COME BACK TO WORK UNLESS SHE HAS A RETURN TO WORK NOTE FROM US.  SHE WANTS TO RETURN TO WORK ON WEDNESDAY 5/22    PLEASE PUT NOTE IN MYCHART

## 2024-05-21 DIAGNOSIS — I50.21 ACUTE SYSTOLIC CHF (CONGESTIVE HEART FAILURE): ICD-10-CM

## 2024-05-21 DIAGNOSIS — I10 ESSENTIAL HYPERTENSION: ICD-10-CM

## 2024-05-21 RX ORDER — FUROSEMIDE 20 MG/1
20 TABLET ORAL DAILY
Qty: 30 TABLET | Refills: 3 | Status: SHIPPED | OUTPATIENT
Start: 2024-05-21

## 2024-05-22 DIAGNOSIS — N39.46 MIXED STRESS AND URGE URINARY INCONTINENCE: ICD-10-CM

## 2024-05-22 RX ORDER — OXYBUTYNIN CHLORIDE 10 MG/1
10 TABLET, EXTENDED RELEASE ORAL DAILY
Qty: 30 TABLET | Refills: 1 | Status: SHIPPED | OUTPATIENT
Start: 2024-05-22

## 2024-07-01 DIAGNOSIS — I10 ESSENTIAL HYPERTENSION: ICD-10-CM

## 2024-07-01 DIAGNOSIS — Z13.0 SCREENING FOR BLOOD DISEASE: ICD-10-CM

## 2024-07-01 RX ORDER — AMLODIPINE BESYLATE 5 MG/1
5 TABLET ORAL DAILY
Qty: 30 TABLET | Refills: 1 | Status: SHIPPED | OUTPATIENT
Start: 2024-07-01

## 2024-07-15 ENCOUNTER — OFFICE VISIT (OUTPATIENT)
Dept: INTERNAL MEDICINE | Facility: CLINIC | Age: 59
End: 2024-07-15
Payer: MEDICAID

## 2024-07-15 VITALS
TEMPERATURE: 97.9 F | BODY MASS INDEX: 21.79 KG/M2 | DIASTOLIC BLOOD PRESSURE: 64 MMHG | OXYGEN SATURATION: 94 % | HEART RATE: 69 BPM | WEIGHT: 123 LBS | SYSTOLIC BLOOD PRESSURE: 104 MMHG | HEIGHT: 63 IN

## 2024-07-15 DIAGNOSIS — R47.89 WORD FINDING PROBLEM: ICD-10-CM

## 2024-07-15 DIAGNOSIS — R41.0 CONFUSION: ICD-10-CM

## 2024-07-15 DIAGNOSIS — R73.03 BORDERLINE DIABETES: ICD-10-CM

## 2024-07-15 DIAGNOSIS — F51.01 PRIMARY INSOMNIA: Primary | ICD-10-CM

## 2024-07-15 DIAGNOSIS — F41.8 DEPRESSION WITH ANXIETY: ICD-10-CM

## 2024-07-15 DIAGNOSIS — R41.3 MEMORY LOSS: ICD-10-CM

## 2024-07-15 DIAGNOSIS — R63.0 DECREASED APPETITE: ICD-10-CM

## 2024-07-15 LAB
EXPIRATION DATE: NORMAL
HBA1C MFR BLD: 5.7 % (ref 4.5–5.7)
Lab: NORMAL

## 2024-07-15 PROCEDURE — 1160F RVW MEDS BY RX/DR IN RCRD: CPT | Performed by: NURSE PRACTITIONER

## 2024-07-15 PROCEDURE — 99214 OFFICE O/P EST MOD 30 MIN: CPT | Performed by: NURSE PRACTITIONER

## 2024-07-15 PROCEDURE — 3044F HG A1C LEVEL LT 7.0%: CPT | Performed by: NURSE PRACTITIONER

## 2024-07-15 PROCEDURE — 83036 HEMOGLOBIN GLYCOSYLATED A1C: CPT | Performed by: NURSE PRACTITIONER

## 2024-07-15 PROCEDURE — 1159F MED LIST DOCD IN RCRD: CPT | Performed by: NURSE PRACTITIONER

## 2024-07-15 PROCEDURE — 1125F AMNT PAIN NOTED PAIN PRSNT: CPT | Performed by: NURSE PRACTITIONER

## 2024-07-15 PROCEDURE — 3078F DIAST BP <80 MM HG: CPT | Performed by: NURSE PRACTITIONER

## 2024-07-15 PROCEDURE — 3074F SYST BP LT 130 MM HG: CPT | Performed by: NURSE PRACTITIONER

## 2024-07-15 RX ORDER — FLUOXETINE HYDROCHLORIDE 20 MG/1
60 CAPSULE ORAL DAILY
Qty: 90 CAPSULE | Refills: 1 | Status: SHIPPED | OUTPATIENT
Start: 2024-07-15

## 2024-07-15 RX ORDER — DIPHENOXYLATE HYDROCHLORIDE AND ATROPINE SULFATE 2.5; .025 MG/1; MG/1
1 TABLET ORAL DAILY
Qty: 30 TABLET | Refills: 5 | Status: SHIPPED | OUTPATIENT
Start: 2024-07-15

## 2024-07-15 RX ORDER — CHLORHEXIDINE GLUCONATE ORAL RINSE 1.2 MG/ML
SOLUTION DENTAL
COMMUNITY
Start: 2024-07-09

## 2024-07-15 RX ORDER — BUDESONIDE 0.5 MG/2ML
0.5 INHALANT ORAL
COMMUNITY
Start: 2023-10-28 | End: 2024-10-27

## 2024-07-15 RX ORDER — SODIUM FLUORIDE 1.1 G/100G
CREAM ORAL
COMMUNITY
Start: 2024-05-14

## 2024-07-15 RX ORDER — MIRTAZAPINE 7.5 MG/1
7.5 TABLET, FILM COATED ORAL NIGHTLY
Qty: 30 TABLET | Refills: 1 | Status: SHIPPED | OUTPATIENT
Start: 2024-07-15

## 2024-07-15 NOTE — PROGRESS NOTES
Subjective   Chief Complaint   Patient presents with    Depression with anxiety     2 month Follow-up        Jamaica Shell is a 58 y.o. female.    History of Present Illness  The patient presents for evaluation of multiple medical concerns. She is accompanied by an adult female.    She reports a significant weight loss, often skipping meals due to a lack of appetite. Despite her efforts, she often feels hungry and vomits her food. She typically eats one large meal at night, but occasionally feels hungry. This issue has been ongoing for some time and has recently worsened. She takes multivitamins. Her primary issue is falling asleep after eating, leading to skipping meals.    She reports difficulty sleeping. She uses melatonin, which provides occasional relief. She was previously prescribed trazodone for sleep, but she does not recall its effectiveness.    She continues to experience memory issues. She forgot to complete her hip, lower back, and neck x-rays, which were ordered in 05/2024. She is scheduled to see a neurologist in 08/2024. She attempts to complete all recommended tasks, but finds them challenging. A brain MRI was ordered in 04/2023, but she has not yet had it done. She occasionally experiences confusion and loses track of her thoughts when attempting to articulate words.    Her depression and anxiety are well-managed. She no longer experiences frequent crying episodes. Her condition improved significantly after leaving her job.    Supplemental Information  She has shoulder pain.   She has free from drinking alcohol for 9 months. She is not taking naltrexone.    I have reviewed the following portions of the patient's history and confirmed they are accurate: allergies, current medications, past family history, past medical history, past social history, past surgical history, and problem list    Review of Systems  Pertinent items are noted in HPI.     Current Outpatient Medications on File Prior to  Visit   Medication Sig    albuterol sulfate HFA (Ventolin HFA) 108 (90 Base) MCG/ACT inhaler INHALE TWO PUFFS BY MOUTH EVERY 4 TO 6 HOURS AS NEEDED FOR COUGH ,  SHORTNESS OF BREATH , OR WHEEZING    amLODIPine (NORVASC) 5 MG tablet TAKE 1 TABLET BY MOUTH DAILY    atorvastatin (Lipitor) 80 MG tablet Take 1 tablet by mouth Daily.    bisoprolol (ZEBeta) 10 MG tablet Take 1 tablet by mouth Daily.    budesonide (PULMICORT) 0.5 MG/2ML nebulizer solution Inhale 2 mL.    buPROPion XL (Wellbutrin XL) 300 MG 24 hr tablet Take 1 tablet by mouth Daily.    chlorhexidine (PERIDEX) 0.12 % solution     Denta 5000 Plus 1.1 % cream     diclofenac (VOLTAREN) 75 MG EC tablet Take 1 tablet by mouth 2 (Two) Times a Day As Needed (moderate pain). Take with food.    Diclofenac Sodium (VOLTAREN) 1 % gel gel APPLY ONE GRAM TOPICALLY TO INDICATED AREA FOUR TIMES A DAY AS NEEDED FOR MILD TO MODERATE PAIN    empagliflozin (Jardiance) 10 MG tablet tablet Take 1 tablet by mouth Daily. Need f/u appt for further refills.    FeroSul 325 (65 Fe) MG tablet TAKE ONE TABLET BY MOUTH DAILY WITH BREAKFAST    FLUoxetine (PROzac) 20 MG capsule TAKE THREE CAPSULES BY MOUTH DAILY    folic acid (FOLVITE) 1 MG tablet Take 1 tablet by mouth Daily.    furosemide (LASIX) 20 MG tablet TAKE 1 TABLET BY MOUTH DAILY    loratadine (CLARITIN) 10 MG tablet Take 1 tablet by mouth Daily.    methocarbamol (ROBAXIN) 750 MG tablet Take 1/2-1 tablet by mouth 4 times daily as needed for muscle spasms.    montelukast (SINGULAIR) 10 MG tablet TAKE ONE TABLET BY MOUTH ONCE NIGHTLY    nicotine (Nicoderm CQ) 21 MG/24HR patch Place 1 patch on the skin as directed by provider Daily.    omeprazole (priLOSEC) 20 MG capsule Take 1 capsule by mouth Daily.    ondansetron ODT (ZOFRAN-ODT) 4 MG disintegrating tablet Place 1 tablet on the tongue Every 6 (Six) Hours As Needed for Nausea or Vomiting.    oxybutynin XL (DITROPAN-XL) 10 MG 24 hr tablet TAKE 1 TABLET BY MOUTH DAILY    potassium  "chloride ER (K-TAB) 20 MEQ tablet controlled-release ER tablet TAKE 1 TABLET BY MOUTH DAILY    sacubitril-valsartan (ENTRESTO)  MG tablet Take 1 tablet by mouth 2 (Two) Times a Day.    spironolactone (Aldactone) 25 MG tablet Take 1 tablet by mouth Daily.    [DISCONTINUED] naltrexone (DEPADE) 50 MG tablet Take 1 tablet by mouth Daily.    [DISCONTINUED] traZODone (DESYREL) 50 MG tablet TAKE 1 TO 2 TABLETS BY MOUTH 1 HOUR BEFORE BEDTIME AS NEEDED FOR SLEEP     No current facility-administered medications on file prior to visit.       Objective   Vitals:    07/15/24 0833   BP: 104/64   Pulse: 69   Temp: 97.9 °F (36.6 °C)   TempSrc: Temporal   SpO2: 94%   Weight: 55.8 kg (123 lb)   Height: 160 cm (63\")     Body mass index is 21.79 kg/m².    Physical Exam  Vitals reviewed.   Constitutional:       Appearance: Normal appearance. She is well-developed.   HENT:      Head: Normocephalic and atraumatic.      Nose: Nose normal.   Eyes:      General: Lids are normal.      Conjunctiva/sclera: Conjunctivae normal.      Pupils: Pupils are equal, round, and reactive to light.   Neck:      Thyroid: No thyromegaly.      Trachea: Trachea normal.   Cardiovascular:      Rate and Rhythm: Normal rate and regular rhythm.      Pulses:           Carotid pulses are 2+ on the right side and 2+ on the left side.     Heart sounds: Normal heart sounds.   Pulmonary:      Effort: Pulmonary effort is normal. No respiratory distress.      Breath sounds: Normal breath sounds.   Musculoskeletal:      Left shoulder: Tenderness present.      Cervical back: Tenderness present.   Skin:     General: Skin is warm and dry.   Neurological:      Mental Status: She is alert and oriented to person, place, and time.      GCS: GCS eye subscore is 4. GCS verbal subscore is 5. GCS motor subscore is 6.   Psychiatric:         Attention and Perception: Attention normal.         Mood and Affect: Mood normal.         Speech: Speech normal.         Behavior: Behavior " normal. Behavior is cooperative.         Thought Content: Thought content normal.         Results  Laboratory Studies  A1c is 5.7. Lipid panel normal. Vitamin levels normal.    Assessment & Plan   Problem List Items Addressed This Visit       Depression with anxiety    Relevant Medications    mirtazapine (REMERON) 7.5 MG tablet    Memory loss    Relevant Orders    MRI Brain Without Contrast     Other Visit Diagnoses       Primary insomnia    -  Primary    Relevant Medications    mirtazapine (REMERON) 7.5 MG tablet    Borderline diabetes        Relevant Orders    POC Glycosylated Hemoglobin (Hb A1C) (Completed)    Decreased appetite        Confusion        Relevant Medications    mirtazapine (REMERON) 7.5 MG tablet    Other Relevant Orders    MRI Brain Without Contrast    Word finding problem        Relevant Orders    MRI Brain Without Contrast               Current Outpatient Medications:     albuterol sulfate HFA (Ventolin HFA) 108 (90 Base) MCG/ACT inhaler, INHALE TWO PUFFS BY MOUTH EVERY 4 TO 6 HOURS AS NEEDED FOR COUGH ,  SHORTNESS OF BREATH , OR WHEEZING, Disp: 18 g, Rfl: 0    amLODIPine (NORVASC) 5 MG tablet, TAKE 1 TABLET BY MOUTH DAILY, Disp: 30 tablet, Rfl: 1    atorvastatin (Lipitor) 80 MG tablet, Take 1 tablet by mouth Daily., Disp: 30 tablet, Rfl: 5    bisoprolol (ZEBeta) 10 MG tablet, Take 1 tablet by mouth Daily., Disp: 90 tablet, Rfl: 3    budesonide (PULMICORT) 0.5 MG/2ML nebulizer solution, Inhale 2 mL., Disp: , Rfl:     buPROPion XL (Wellbutrin XL) 300 MG 24 hr tablet, Take 1 tablet by mouth Daily., Disp: 30 tablet, Rfl: 1    chlorhexidine (PERIDEX) 0.12 % solution, , Disp: , Rfl:     Denta 5000 Plus 1.1 % cream, , Disp: , Rfl:     diclofenac (VOLTAREN) 75 MG EC tablet, Take 1 tablet by mouth 2 (Two) Times a Day As Needed (moderate pain). Take with food., Disp: 60 tablet, Rfl: 5    Diclofenac Sodium (VOLTAREN) 1 % gel gel, APPLY ONE GRAM TOPICALLY TO INDICATED AREA FOUR TIMES A DAY AS NEEDED FOR  MILD TO MODERATE PAIN, Disp: 100 g, Rfl: 2    empagliflozin (Jardiance) 10 MG tablet tablet, Take 1 tablet by mouth Daily. Need f/u appt for further refills., Disp: 30 tablet, Rfl: 0    FeroSul 325 (65 Fe) MG tablet, TAKE ONE TABLET BY MOUTH DAILY WITH BREAKFAST, Disp: 30 tablet, Rfl: 5    FLUoxetine (PROzac) 20 MG capsule, TAKE THREE CAPSULES BY MOUTH DAILY, Disp: 90 capsule, Rfl: 1    folic acid (FOLVITE) 1 MG tablet, Take 1 tablet by mouth Daily., Disp: 90 tablet, Rfl: 3    furosemide (LASIX) 20 MG tablet, TAKE 1 TABLET BY MOUTH DAILY, Disp: 30 tablet, Rfl: 3    loratadine (CLARITIN) 10 MG tablet, Take 1 tablet by mouth Daily., Disp: 30 tablet, Rfl: 5    methocarbamol (ROBAXIN) 750 MG tablet, Take 1/2-1 tablet by mouth 4 times daily as needed for muscle spasms., Disp: 60 tablet, Rfl: 2    montelukast (SINGULAIR) 10 MG tablet, TAKE ONE TABLET BY MOUTH ONCE NIGHTLY, Disp: 90 tablet, Rfl: 1    nicotine (Nicoderm CQ) 21 MG/24HR patch, Place 1 patch on the skin as directed by provider Daily., Disp: 28 each, Rfl: 1    omeprazole (priLOSEC) 20 MG capsule, Take 1 capsule by mouth Daily., Disp: 30 capsule, Rfl: 5    ondansetron ODT (ZOFRAN-ODT) 4 MG disintegrating tablet, Place 1 tablet on the tongue Every 6 (Six) Hours As Needed for Nausea or Vomiting., Disp: 30 tablet, Rfl: 1    oxybutynin XL (DITROPAN-XL) 10 MG 24 hr tablet, TAKE 1 TABLET BY MOUTH DAILY, Disp: 30 tablet, Rfl: 1    potassium chloride ER (K-TAB) 20 MEQ tablet controlled-release ER tablet, TAKE 1 TABLET BY MOUTH DAILY, Disp: 90 tablet, Rfl: 1    sacubitril-valsartan (ENTRESTO)  MG tablet, Take 1 tablet by mouth 2 (Two) Times a Day., Disp: 60 tablet, Rfl: 6    spironolactone (Aldactone) 25 MG tablet, Take 1 tablet by mouth Daily., Disp: 90 tablet, Rfl: 3    mirtazapine (REMERON) 7.5 MG tablet, Take 1 tablet by mouth Every Night., Disp: 30 tablet, Rfl: 1    multivitamin (Multiple Vitamin) tablet tablet, Take 1 tablet by mouth Daily., Disp: 30 tablet,  Rfl: 5    Assessment & Plan  1. Insomnia.  Chronic, unstable. Start mirtazapine. Hoping this will also help with appetite. Discontinue trazodone.    2. Borderline diabetes.  Chronic, stable. Continue low carb, low sugar diet. Continue Jardiance.    3. Hypertension.  Chronic, stable. Continue bisoprolol, amlodipine, Entresto, Lasix, spironolactone, and potassium. Follow heart healthy, low cholesterol, high fiber diet. Recheck fasting lipid panel and CMP in 3 months.    4. Memory loss with confusion and word finding problem.  Chronic, unstable. Brain MRI ordered. Has upcoming appointment with Neurology.    5. Depression with anxiety.  Chronic, stable. Continue Wellbutrin and Prozac.    6. Decreased appetite.  Chronic, unstable. Encouraged patient to eat high calorie, high protein foods. Starting mirtazapine for sleep and hopes this will also increase appetite. Recheck labs in 3 months.         Plan of care reviewed with the patient at the conclusion of today's visit.  Education was provided regarding diagnosis, management, and any prescribed or recommended OTC medications.  Patient verbalized understanding of and agreement with management plan.     Return in about 3 months (around 10/15/2024), or if symptoms worsen or fail to improve, for Follow-up.      Transcribed from ambient dictation for MARTIN Ramirez by MARTIN Ramirez.  07/15/24   09:04 EDT    Patient or patient representative verbalized consent for the use of Ambient Listening during the visit with  MARTIN Ramirez for chart documentation. 7/15/2024  09:10 EDT

## 2024-07-16 DIAGNOSIS — N39.46 MIXED STRESS AND URGE URINARY INCONTINENCE: ICD-10-CM

## 2024-07-16 RX ORDER — OXYBUTYNIN CHLORIDE 10 MG/1
10 TABLET, EXTENDED RELEASE ORAL DAILY
Qty: 30 TABLET | Refills: 1 | Status: SHIPPED | OUTPATIENT
Start: 2024-07-16

## 2024-07-24 ENCOUNTER — HOSPITAL ENCOUNTER (OUTPATIENT)
Dept: GENERAL RADIOLOGY | Facility: HOSPITAL | Age: 59
Discharge: HOME | End: 2024-07-24
Admitting: NURSE PRACTITIONER
Payer: MEDICAID

## 2024-07-24 DIAGNOSIS — G89.29 CHRONIC LEFT SHOULDER PAIN: ICD-10-CM

## 2024-07-24 DIAGNOSIS — M54.50 LUMBAR PAIN: ICD-10-CM

## 2024-07-24 DIAGNOSIS — M25.551 RIGHT HIP PAIN: ICD-10-CM

## 2024-07-24 DIAGNOSIS — M25.512 CHRONIC LEFT SHOULDER PAIN: ICD-10-CM

## 2024-07-24 PROCEDURE — 72050 X-RAY EXAM NECK SPINE 4/5VWS: CPT

## 2024-07-24 PROCEDURE — 72110 X-RAY EXAM L-2 SPINE 4/>VWS: CPT

## 2024-07-24 PROCEDURE — 73502 X-RAY EXAM HIP UNI 2-3 VIEWS: CPT

## 2024-07-25 ENCOUNTER — TELEPHONE (OUTPATIENT)
Dept: INTERNAL MEDICINE | Facility: CLINIC | Age: 59
End: 2024-07-25
Payer: MEDICAID

## 2024-07-25 NOTE — TELEPHONE ENCOUNTER
[facility] CALLED TO INFORM US THE MRI HAS BEEN DENIED, INSURANCE SAID IT DENIED TO THE MRI DUE TO NO BLOOD TEST OR ANY SPECIAL TEST TO CHECK HER ABILITY TO REMEMBER, REASON, CONCENTRATE, AND/ OR SOLVE PROBLEMS. PEER TO PEER IS AVAILABLE, CALL [phone] AND THE CASE NUMBER IS [id number], IF [facility] DOESN'T HEAR BACK FROM US BY TOMORROW AT 3PM THEY WILL R./S THE APPT, PLEASE ADVISE.

## 2024-07-31 RX ORDER — EMPAGLIFLOZIN 10 MG/1
TABLET, FILM COATED ORAL
Qty: 30 TABLET | Refills: 0 | OUTPATIENT
Start: 2024-07-31

## 2024-08-06 ENCOUNTER — TELEPHONE (OUTPATIENT)
Dept: NEUROLOGY | Facility: CLINIC | Age: 59
End: 2024-08-06
Payer: MEDICAID

## 2024-08-06 NOTE — TELEPHONE ENCOUNTER
Called patient, and told her there wasn't anything available now, but they would call her with the results of her MRI.  Also forwarded to [facility].

## 2024-08-06 NOTE — TELEPHONE ENCOUNTER
Caller: Jamaica Shell    Relationship to patient: Self    Best call back number: [phone]    Chief complaint: PT NEEDS APPT AFTER SHE HAS THE MRI ON 8-15-24    Type of visit: FU    Requested date: AFTER 8-15-24 WHEN PT HAS MRI SCHEDULED    If rescheduling, when is the original appointment:   10-23-24 AND ON THE WAIT LIST FOR SOONER APPT    Additional notes:   PLEASE CALL PT IF SOONER APPT BECOMES AVAILABLE.

## 2024-08-09 DIAGNOSIS — I50.22 CHRONIC HFREF (HEART FAILURE WITH REDUCED EJECTION FRACTION): ICD-10-CM

## 2024-08-09 DIAGNOSIS — I10 ESSENTIAL HYPERTENSION: ICD-10-CM

## 2024-08-11 DIAGNOSIS — I10 ESSENTIAL HYPERTENSION: ICD-10-CM

## 2024-08-12 RX ORDER — POTASSIUM CHLORIDE 1500 MG/1
20 TABLET, EXTENDED RELEASE ORAL DAILY
Qty: 90 TABLET | Refills: 1 | Status: SHIPPED | OUTPATIENT
Start: 2024-08-12

## 2024-08-12 NOTE — TELEPHONE ENCOUNTER
Last seen 1/4/24. No follow up on file. Please review and refill, deny or reroute.  Katelynn Murray MA

## 2024-08-14 RX ORDER — SACUBITRIL AND VALSARTAN 97; 103 MG/1; MG/1
1 TABLET, FILM COATED ORAL 2 TIMES DAILY
Qty: 60 TABLET | Refills: 6 | OUTPATIENT
Start: 2024-08-14

## 2024-08-14 NOTE — TELEPHONE ENCOUNTER
Pt currently does not have a f/u with [facility].  Will route to [facility] for review

## 2024-08-15 ENCOUNTER — HOSPITAL ENCOUNTER (OUTPATIENT)
Facility: HOSPITAL | Age: 59
Discharge: HOME | End: 2024-08-15
Admitting: NURSE PRACTITIONER
Payer: MEDICAID

## 2024-08-15 DIAGNOSIS — R41.0 CONFUSION: ICD-10-CM

## 2024-08-15 DIAGNOSIS — R47.89 WORD FINDING PROBLEM: ICD-10-CM

## 2024-08-15 DIAGNOSIS — R41.3 MEMORY LOSS: ICD-10-CM

## 2024-08-15 PROCEDURE — 70551 MRI BRAIN STEM W/O DYE: CPT

## 2024-08-18 RX ORDER — ASPIRIN 81 MG/1
81 TABLET ORAL DAILY
Qty: 90 TABLET | Refills: 1 | Status: SHIPPED | OUTPATIENT
Start: 2024-08-18

## 2024-08-29 DIAGNOSIS — I10 ESSENTIAL HYPERTENSION: ICD-10-CM

## 2024-08-29 DIAGNOSIS — Z13.0 SCREENING FOR BLOOD DISEASE: ICD-10-CM

## 2024-08-29 RX ORDER — AMLODIPINE BESYLATE 5 MG/1
5 TABLET ORAL DAILY
Qty: 30 TABLET | Refills: 1 | Status: SHIPPED | OUTPATIENT
Start: 2024-08-29

## 2024-09-19 ENCOUNTER — TELEPHONE (OUTPATIENT)
Dept: INTERNAL MEDICINE | Facility: CLINIC | Age: 59
End: 2024-09-19

## 2024-09-25 DIAGNOSIS — N39.46 MIXED STRESS AND URGE URINARY INCONTINENCE: ICD-10-CM

## 2024-09-25 DIAGNOSIS — F51.01 PRIMARY INSOMNIA: ICD-10-CM

## 2024-09-25 RX ORDER — MIRTAZAPINE 7.5 MG/1
7.5 TABLET, FILM COATED ORAL NIGHTLY
Qty: 30 TABLET | Refills: 1 | Status: SHIPPED | OUTPATIENT
Start: 2024-09-25

## 2024-09-25 RX ORDER — OXYBUTYNIN CHLORIDE 10 MG/1
10 TABLET, EXTENDED RELEASE ORAL DAILY
Qty: 30 TABLET | Refills: 1 | Status: SHIPPED | OUTPATIENT
Start: 2024-09-25

## 2024-10-14 ENCOUNTER — OFFICE VISIT (OUTPATIENT)
Dept: INTERNAL MEDICINE | Facility: CLINIC | Age: 59
End: 2024-10-14
Payer: MEDICAID

## 2024-10-14 ENCOUNTER — LAB (OUTPATIENT)
Dept: INTERNAL MEDICINE | Facility: CLINIC | Age: 59
End: 2024-10-14
Payer: MEDICAID

## 2024-10-14 VITALS
OXYGEN SATURATION: 96 % | HEIGHT: 62 IN | TEMPERATURE: 97.6 F | HEART RATE: 73 BPM | WEIGHT: 141.2 LBS | SYSTOLIC BLOOD PRESSURE: 120 MMHG | DIASTOLIC BLOOD PRESSURE: 82 MMHG | BODY MASS INDEX: 25.98 KG/M2

## 2024-10-14 DIAGNOSIS — R93.89 ABNORMAL X-RAY: ICD-10-CM

## 2024-10-14 DIAGNOSIS — R73.03 BORDERLINE DIABETES: ICD-10-CM

## 2024-10-14 DIAGNOSIS — M54.50 LUMBAR PAIN: ICD-10-CM

## 2024-10-14 DIAGNOSIS — G89.29 CHRONIC LEFT SHOULDER PAIN: ICD-10-CM

## 2024-10-14 DIAGNOSIS — M54.12 CERVICAL RADICULOPATHY: ICD-10-CM

## 2024-10-14 DIAGNOSIS — I70.0 ATHEROSCLEROSIS OF AORTA: ICD-10-CM

## 2024-10-14 DIAGNOSIS — I10 ESSENTIAL HYPERTENSION: ICD-10-CM

## 2024-10-14 DIAGNOSIS — M54.2 CERVICAL PAIN: Primary | ICD-10-CM

## 2024-10-14 DIAGNOSIS — R60.0 LEG EDEMA: ICD-10-CM

## 2024-10-14 DIAGNOSIS — M25.512 CHRONIC LEFT SHOULDER PAIN: ICD-10-CM

## 2024-10-14 DIAGNOSIS — R60.1 GENERALIZED EDEMA: ICD-10-CM

## 2024-10-14 DIAGNOSIS — M25.551 RIGHT HIP PAIN: ICD-10-CM

## 2024-10-14 LAB
ALBUMIN SERPL-MCNC: 4 G/DL (ref 3.5–5.2)
ALBUMIN/GLOB SERPL: 1.5 G/DL
ALP SERPL-CCNC: 111 U/L (ref 39–117)
ALT SERPL W P-5'-P-CCNC: 24 U/L (ref 1–33)
ANION GAP SERPL CALCULATED.3IONS-SCNC: 13.2 MMOL/L (ref 5–15)
AST SERPL-CCNC: 22 U/L (ref 1–32)
BILIRUB SERPL-MCNC: <0.2 MG/DL (ref 0–1.2)
BUN SERPL-MCNC: 15 MG/DL (ref 6–20)
BUN/CREAT SERPL: 14.3 (ref 7–25)
CALCIUM SPEC-SCNC: 9.8 MG/DL (ref 8.6–10.5)
CHLORIDE SERPL-SCNC: 102 MMOL/L (ref 98–107)
CO2 SERPL-SCNC: 22.8 MMOL/L (ref 22–29)
CREAT SERPL-MCNC: 1.05 MG/DL (ref 0.57–1)
DEPRECATED RDW RBC AUTO: 42.7 FL (ref 37–54)
EGFRCR SERPLBLD CKD-EPI 2021: 62.5 ML/MIN/1.73
ERYTHROCYTE [DISTWIDTH] IN BLOOD BY AUTOMATED COUNT: 14.6 % (ref 12.3–15.4)
GLOBULIN UR ELPH-MCNC: 2.7 GM/DL
GLUCOSE SERPL-MCNC: 103 MG/DL (ref 65–99)
HBA1C MFR BLD: 5.9 % (ref 4.8–5.6)
HCT VFR BLD AUTO: 40 % (ref 34–46.6)
HGB BLD-MCNC: 12.3 G/DL (ref 12–15.9)
MCH RBC QN AUTO: 24.7 PG (ref 26.6–33)
MCHC RBC AUTO-ENTMCNC: 30.8 G/DL (ref 31.5–35.7)
MCV RBC AUTO: 80.3 FL (ref 79–97)
PLATELET # BLD AUTO: 409 10*3/MM3 (ref 140–450)
PMV BLD AUTO: 9.6 FL (ref 6–12)
POTASSIUM SERPL-SCNC: 4.6 MMOL/L (ref 3.5–5.2)
PROT SERPL-MCNC: 6.7 G/DL (ref 6–8.5)
RBC # BLD AUTO: 4.98 10*6/MM3 (ref 3.77–5.28)
SODIUM SERPL-SCNC: 138 MMOL/L (ref 136–145)
WBC NRBC COR # BLD AUTO: 8.14 10*3/MM3 (ref 3.4–10.8)

## 2024-10-14 PROCEDURE — 80053 COMPREHEN METABOLIC PANEL: CPT | Performed by: NURSE PRACTITIONER

## 2024-10-14 PROCEDURE — 83036 HEMOGLOBIN GLYCOSYLATED A1C: CPT | Performed by: NURSE PRACTITIONER

## 2024-10-14 PROCEDURE — 85027 COMPLETE CBC AUTOMATED: CPT | Performed by: NURSE PRACTITIONER

## 2024-10-14 RX ORDER — FUROSEMIDE 40 MG
40 TABLET ORAL DAILY
Qty: 30 TABLET | Refills: 0 | Status: SHIPPED | OUTPATIENT
Start: 2024-10-14

## 2024-10-14 RX ORDER — BUSPIRONE HYDROCHLORIDE 5 MG/1
TABLET ORAL
Qty: 90 TABLET | Refills: 2 | Status: SHIPPED | OUTPATIENT
Start: 2024-10-14

## 2024-10-14 RX ORDER — SPIRONOLACTONE 25 MG/1
25 TABLET ORAL DAILY
Qty: 90 TABLET | Refills: 1
Start: 2024-10-14

## 2024-10-14 RX ORDER — TRAMADOL HYDROCHLORIDE 50 MG/1
50 TABLET ORAL EVERY 6 HOURS PRN
Qty: 28 TABLET | Refills: 0 | Status: SHIPPED | OUTPATIENT
Start: 2024-10-14

## 2024-10-14 RX ORDER — BACLOFEN 10 MG/1
10 TABLET ORAL 3 TIMES DAILY PRN
Qty: 90 TABLET | Refills: 1 | Status: SHIPPED | OUTPATIENT
Start: 2024-10-14

## 2024-10-14 RX ORDER — POTASSIUM CHLORIDE 1500 MG/1
20 TABLET, EXTENDED RELEASE ORAL DAILY
Qty: 90 TABLET | Refills: 1 | Status: SHIPPED | OUTPATIENT
Start: 2024-10-14

## 2024-10-14 NOTE — PROGRESS NOTES
Subjective   Chief Complaint   Patient presents with    Hypertension    Swelling in stomach    Swelling in feet        Jamaica Shell is a 58 y.o. female.    History of Present Illness  The patient presents for evaluation of multiple medical concerns. She is accompanied by a friend.    She reports experiencing episodes of abdominal swelling, describing it as feeling hard and distended. Her weight has increased from 125 to 141 pounds. She has been taking Lasix and spironolactone but discontinued them due to increased urination. She experienced an episode of urinary incontinence that lasted for 2 days before resolving. She is uncertain about her current medication regimen but believes she is taking all prescribed medications. She is also on Entresto, amlodipine, and bisoprolol for blood pressure management.    She has previously experienced similar symptoms, which resolved spontaneously. She takes diclofenac twice daily for arthritis but reports it is ineffective. She has had x-rays of her legs, hip, and shoulder, and a brain MRI. She experiences pain when lifting her left arm and suspects she may have a frozen shoulder. She also reports groin pain and difficulty walking due to leg pain. She experiences hip pain when standing up and is unable to straighten her leg. She is currently unable to work due to these issues. She has tried tramadol in the past.    She is also on bupropion and fluoxetine for anxiety but reports they are not effective. She has previously tried BuSpar. She has been taking Omega XL, which she finds beneficial. She admits to consuming junk food and chocolate and does not eat regular meals.      I have reviewed the following portions of the patient's history and confirmed they are accurate: allergies, current medications, past family history, past medical history, past social history, past surgical history, and problem list    Review of Systems  Pertinent items are noted in HPI.     Current  Outpatient Medications on File Prior to Visit   Medication Sig    albuterol sulfate HFA (Ventolin HFA) 108 (90 Base) MCG/ACT inhaler INHALE TWO PUFFS BY MOUTH EVERY 4 TO 6 HOURS AS NEEDED FOR COUGH ,  SHORTNESS OF BREATH , OR WHEEZING    aspirin 81 MG EC tablet Take 1 tablet by mouth Daily.    atorvastatin (Lipitor) 80 MG tablet Take 1 tablet by mouth Daily.    bisoprolol (ZEBeta) 10 MG tablet Take 1 tablet by mouth Daily.    budesonide (PULMICORT) 0.5 MG/2ML nebulizer solution Inhale 2 mL.    buPROPion XL (Wellbutrin XL) 300 MG 24 hr tablet Take 1 tablet by mouth Daily.    chlorhexidine (PERIDEX) 0.12 % solution     Denta 5000 Plus 1.1 % cream     Diclofenac Sodium (VOLTAREN) 1 % gel gel APPLY ONE GRAM TOPICALLY TO INDICATED AREA FOUR TIMES A DAY AS NEEDED FOR MILD TO MODERATE PAIN    empagliflozin (Jardiance) 10 MG tablet tablet Take 1 tablet by mouth Daily. Need f/u appt for further refills.    FeroSul 325 (65 Fe) MG tablet TAKE ONE TABLET BY MOUTH DAILY WITH BREAKFAST    FLUoxetine (PROzac) 20 MG capsule TAKE THREE CAPSULES BY MOUTH DAILY    folic acid (FOLVITE) 1 MG tablet Take 1 tablet by mouth Daily.    loratadine (CLARITIN) 10 MG tablet Take 1 tablet by mouth Daily.    mirtazapine (REMERON) 7.5 MG tablet TAKE ONE TABLET BY MOUTH ONCE NIGHTLY    montelukast (SINGULAIR) 10 MG tablet TAKE ONE TABLET BY MOUTH ONCE NIGHTLY    multivitamin (Multiple Vitamin) tablet tablet Take 1 tablet by mouth Daily.    nicotine (Nicoderm CQ) 21 MG/24HR patch Place 1 patch on the skin as directed by provider Daily.    omeprazole (priLOSEC) 20 MG capsule TAKE 1 CAPSULE BY MOUTH DAILY    ondansetron ODT (ZOFRAN-ODT) 4 MG disintegrating tablet Place 1 tablet on the tongue Every 6 (Six) Hours As Needed for Nausea or Vomiting.    oxybutynin XL (DITROPAN-XL) 10 MG 24 hr tablet TAKE 1 TABLET BY MOUTH DAILY    sacubitril-valsartan (ENTRESTO)  MG tablet Take 1 tablet by mouth 2 (Two) Times a Day. Need f/u appt for further refills.  "   [DISCONTINUED] amLODIPine (NORVASC) 5 MG tablet TAKE 1 TABLET BY MOUTH DAILY    [DISCONTINUED] diclofenac (VOLTAREN) 75 MG EC tablet Take 1 tablet by mouth 2 (Two) Times a Day As Needed (moderate pain). Take with food.    [DISCONTINUED] furosemide (LASIX) 20 MG tablet TAKE 1 TABLET BY MOUTH DAILY    [DISCONTINUED] methocarbamol (ROBAXIN) 750 MG tablet Take 1/2-1 tablet by mouth 4 times daily as needed for muscle spasms.    [DISCONTINUED] potassium chloride ER (K-TAB) 20 MEQ tablet controlled-release ER tablet TAKE 1 TABLET BY MOUTH DAILY    [DISCONTINUED] spironolactone (Aldactone) 25 MG tablet Take 1 tablet by mouth Daily.     No current facility-administered medications on file prior to visit.       Objective   Vitals:    10/14/24 0808   BP: 120/82   BP Location: Left arm   Patient Position: Sitting   Cuff Size: Adult   Pulse: 73   Temp: 97.6 °F (36.4 °C)   SpO2: 96%   Weight: 64 kg (141 lb 3.2 oz)   Height: 157.5 cm (62.01\")     Body mass index is 25.82 kg/m².    Physical Exam  Vitals reviewed.   Constitutional:       Appearance: Normal appearance. She is well-developed.   HENT:      Head: Normocephalic and atraumatic.      Nose: Nose normal.   Eyes:      General: Lids are normal.      Conjunctiva/sclera: Conjunctivae normal.      Pupils: Pupils are equal, round, and reactive to light.   Neck:      Thyroid: No thyromegaly.      Trachea: Trachea normal.   Cardiovascular:      Rate and Rhythm: Normal rate and regular rhythm.      Pulses:           Carotid pulses are 2+ on the right side and 2+ on the left side.     Heart sounds: Normal heart sounds.   Pulmonary:      Effort: Pulmonary effort is normal. No respiratory distress.      Breath sounds: Normal breath sounds.   Musculoskeletal:      Left shoulder: Tenderness present.      Cervical back: Tenderness present.      Lumbar back: Tenderness present.      Right hip: Tenderness present.      Right lower leg: Edema present.      Left lower leg: Edema present. "   Skin:     General: Skin is warm and dry.   Neurological:      Mental Status: She is alert and oriented to person, place, and time.      GCS: GCS eye subscore is 4. GCS verbal subscore is 5. GCS motor subscore is 6.   Psychiatric:         Attention and Perception: Attention normal.         Mood and Affect: Mood normal.         Speech: Speech normal.         Behavior: Behavior normal. Behavior is cooperative.         Thought Content: Thought content normal.         Results  Laboratory Studies  Potassium levels were normal. Kidney function was normal. Blood sugar had increased.    Imaging  Right hip xray shows ill-defined sclerosis of the right femoral head and subtle loss of femoral head sphericity concerning for sequelae of avascular necrosis. Brain MRI showed chronic vascular changes, no signs of stroke. Neck X-ray showed mild arthritis. Shoulder X-ray showed mild arthritis. Lower back X-ray showed mild arthritis. Left knee, leg, foot, and ankle X-rays were normal.  Xrays show aortic atherosclerotic disease     Assessment & Plan   Problem List Items Addressed This Visit       Essential hypertension    Relevant Medications    potassium chloride ER (K-TAB) 20 MEQ tablet controlled-release ER tablet    furosemide (Lasix) 40 MG tablet    spironolactone (Aldactone) 25 MG tablet    Other Relevant Orders    Comprehensive Metabolic Panel    CBC (No Diff)     Other Visit Diagnoses       Cervical pain    -  Primary    Relevant Medications    traMADol (ULTRAM) 50 MG tablet    baclofen (LIORESAL) 10 MG tablet    Cervical radiculopathy        Relevant Medications    traMADol (ULTRAM) 50 MG tablet    baclofen (LIORESAL) 10 MG tablet    Generalized edema        Relevant Orders    Comprehensive Metabolic Panel    CBC (No Diff)    Borderline diabetes        Relevant Orders    Comprehensive Metabolic Panel    Hemoglobin A1c               Current Outpatient Medications:     potassium chloride ER (K-TAB) 20 MEQ tablet  controlled-release ER tablet, Take 1 tablet by mouth Daily., Disp: 90 tablet, Rfl: 1    spironolactone (Aldactone) 25 MG tablet, Take 1 tablet by mouth Daily., Disp: 90 tablet, Rfl: 1    albuterol sulfate HFA (Ventolin HFA) 108 (90 Base) MCG/ACT inhaler, INHALE TWO PUFFS BY MOUTH EVERY 4 TO 6 HOURS AS NEEDED FOR COUGH ,  SHORTNESS OF BREATH , OR WHEEZING, Disp: 18 g, Rfl: 0    aspirin 81 MG EC tablet, Take 1 tablet by mouth Daily., Disp: 90 tablet, Rfl: 1    atorvastatin (Lipitor) 80 MG tablet, Take 1 tablet by mouth Daily., Disp: 30 tablet, Rfl: 5    baclofen (LIORESAL) 10 MG tablet, Take 1 tablet by mouth 3 (Three) Times a Day As Needed for Muscle Spasms., Disp: 90 tablet, Rfl: 1    bisoprolol (ZEBeta) 10 MG tablet, Take 1 tablet by mouth Daily., Disp: 90 tablet, Rfl: 3    budesonide (PULMICORT) 0.5 MG/2ML nebulizer solution, Inhale 2 mL., Disp: , Rfl:     buPROPion XL (Wellbutrin XL) 300 MG 24 hr tablet, Take 1 tablet by mouth Daily., Disp: 30 tablet, Rfl: 1    busPIRone (BUSPAR) 5 MG tablet, Take 1-2 tablets by mouth three times daily as needed for anxiety., Disp: 90 tablet, Rfl: 2    chlorhexidine (PERIDEX) 0.12 % solution, , Disp: , Rfl:     Denta 5000 Plus 1.1 % cream, , Disp: , Rfl:     Diclofenac Sodium (VOLTAREN) 1 % gel gel, APPLY ONE GRAM TOPICALLY TO INDICATED AREA FOUR TIMES A DAY AS NEEDED FOR MILD TO MODERATE PAIN, Disp: 100 g, Rfl: 2    empagliflozin (Jardiance) 10 MG tablet tablet, Take 1 tablet by mouth Daily. Need f/u appt for further refills., Disp: 30 tablet, Rfl: 0    FeroSul 325 (65 Fe) MG tablet, TAKE ONE TABLET BY MOUTH DAILY WITH BREAKFAST, Disp: 30 tablet, Rfl: 5    FLUoxetine (PROzac) 20 MG capsule, TAKE THREE CAPSULES BY MOUTH DAILY, Disp: 90 capsule, Rfl: 1    folic acid (FOLVITE) 1 MG tablet, Take 1 tablet by mouth Daily., Disp: 90 tablet, Rfl: 3    furosemide (Lasix) 40 MG tablet, Take 1 tablet by mouth Daily., Disp: 30 tablet, Rfl: 0    loratadine (CLARITIN) 10 MG tablet, Take 1  tablet by mouth Daily., Disp: 30 tablet, Rfl: 5    mirtazapine (REMERON) 7.5 MG tablet, TAKE ONE TABLET BY MOUTH ONCE NIGHTLY, Disp: 30 tablet, Rfl: 1    montelukast (SINGULAIR) 10 MG tablet, TAKE ONE TABLET BY MOUTH ONCE NIGHTLY, Disp: 90 tablet, Rfl: 1    multivitamin (Multiple Vitamin) tablet tablet, Take 1 tablet by mouth Daily., Disp: 30 tablet, Rfl: 5    nicotine (Nicoderm CQ) 21 MG/24HR patch, Place 1 patch on the skin as directed by provider Daily., Disp: 28 each, Rfl: 1    omeprazole (priLOSEC) 20 MG capsule, TAKE 1 CAPSULE BY MOUTH DAILY, Disp: 30 capsule, Rfl: 5    ondansetron ODT (ZOFRAN-ODT) 4 MG disintegrating tablet, Place 1 tablet on the tongue Every 6 (Six) Hours As Needed for Nausea or Vomiting., Disp: 30 tablet, Rfl: 1    oxybutynin XL (DITROPAN-XL) 10 MG 24 hr tablet, TAKE 1 TABLET BY MOUTH DAILY, Disp: 30 tablet, Rfl: 1    sacubitril-valsartan (ENTRESTO)  MG tablet, Take 1 tablet by mouth 2 (Two) Times a Day. Need f/u appt for further refills., Disp: 60 tablet, Rfl: 2    traMADol (ULTRAM) 50 MG tablet, Take 1 tablet by mouth Every 6 (Six) Hours As Needed for Moderate Pain., Disp: 28 tablet, Rfl: 0    Assessment & Plan  Increase Lasix to 40 mg daily with potassium for edema.  Patient will check home medications and either restart or continue spironolactone.  She will contact office with accurate medication list.  Checking CMP.  Ordering venous Doppler to rule out venous insufficiency.  Continue aspirin and atorvastatin for hyperlipidemia and arthrosclerotic disease.  Continue Entresto and bisoprolol for hypertension.  Continue follow-ups with cardiology.  Continue Jardiance for cardiovascular protection and borderline diabetes.  Checking A1c.  Follow heart healthy low-cholesterol high-fiber diabetic diet.  Cervical MRI ordered.  Referring to orthopedics for right hip pain.  Referring to physical therapy for cervical, lumbar, right hip, and left shoulder pain.  Start baclofen and tramadol as  needed for pain.  If this prescription helps with pain and deemed necessary for long-term use will complete urine drug screen and controlled substance contract.  Ben reviewed and appropriate.    Follow-up  Return in 1 week for follow-up.         Plan of care reviewed with the patient at the conclusion of today's visit.  Education was provided regarding diagnosis, management, and any prescribed or recommended OTC medications.  Patient verbalized understanding of and agreement with management plan.     Return in about 1 week (around 10/21/2024), or if symptoms worsen or fail to improve, for Follow-up.      Transcribed from ambient dictation for MARTIN Ramirez by MARTIN Ramirez.  10/14/24   08:49 EDT    Patient or patient representative verbalized consent for the use of Ambient Listening during the visit with  MARTIN Ramirez for chart documentation. 10/20/2024  18:04 EDT

## 2024-10-20 PROBLEM — M25.551 RIGHT HIP PAIN: Status: ACTIVE | Noted: 2024-10-20

## 2024-10-20 PROBLEM — R73.03 BORDERLINE DIABETES: Status: ACTIVE | Noted: 2024-10-20

## 2024-10-20 PROBLEM — M54.50 LUMBAR PAIN: Status: ACTIVE | Noted: 2024-10-20

## 2024-10-20 PROBLEM — M25.512 CHRONIC LEFT SHOULDER PAIN: Status: ACTIVE | Noted: 2024-10-20

## 2024-10-20 PROBLEM — G89.29 CHRONIC LEFT SHOULDER PAIN: Status: ACTIVE | Noted: 2024-10-20

## 2024-10-20 PROBLEM — I70.0 ATHEROSCLEROSIS OF AORTA: Status: ACTIVE | Noted: 2024-10-20

## 2024-10-22 ENCOUNTER — OFFICE VISIT (OUTPATIENT)
Dept: INTERNAL MEDICINE | Facility: CLINIC | Age: 59
End: 2024-10-22
Payer: MEDICAID

## 2024-10-22 VITALS
HEART RATE: 71 BPM | TEMPERATURE: 97.8 F | HEIGHT: 62 IN | SYSTOLIC BLOOD PRESSURE: 126 MMHG | DIASTOLIC BLOOD PRESSURE: 84 MMHG | BODY MASS INDEX: 25.76 KG/M2 | WEIGHT: 140 LBS | OXYGEN SATURATION: 97 %

## 2024-10-22 DIAGNOSIS — R73.03 BORDERLINE DIABETES: ICD-10-CM

## 2024-10-22 DIAGNOSIS — H61.23 BILATERAL IMPACTED CERUMEN: ICD-10-CM

## 2024-10-22 DIAGNOSIS — J98.8 RESPIRATORY INFECTION: ICD-10-CM

## 2024-10-22 DIAGNOSIS — F41.9 ANXIETY AND DEPRESSION: Primary | ICD-10-CM

## 2024-10-22 DIAGNOSIS — F32.A ANXIETY AND DEPRESSION: Primary | ICD-10-CM

## 2024-10-22 LAB
EXPIRATION DATE: ABNORMAL
FLUAV AG UPPER RESP QL IA.RAPID: NOT DETECTED
FLUBV AG UPPER RESP QL IA.RAPID: NOT DETECTED
INTERNAL CONTROL: ABNORMAL
Lab: ABNORMAL
SARS-COV-2 AG UPPER RESP QL IA.RAPID: NOT DETECTED

## 2024-10-22 PROCEDURE — 99214 OFFICE O/P EST MOD 30 MIN: CPT | Performed by: NURSE PRACTITIONER

## 2024-10-22 PROCEDURE — 3079F DIAST BP 80-89 MM HG: CPT | Performed by: NURSE PRACTITIONER

## 2024-10-22 PROCEDURE — 3074F SYST BP LT 130 MM HG: CPT | Performed by: NURSE PRACTITIONER

## 2024-10-22 PROCEDURE — 1160F RVW MEDS BY RX/DR IN RCRD: CPT | Performed by: NURSE PRACTITIONER

## 2024-10-22 PROCEDURE — 87428 SARSCOV & INF VIR A&B AG IA: CPT | Performed by: NURSE PRACTITIONER

## 2024-10-22 PROCEDURE — 1125F AMNT PAIN NOTED PAIN PRSNT: CPT | Performed by: NURSE PRACTITIONER

## 2024-10-22 PROCEDURE — 1159F MED LIST DOCD IN RCRD: CPT | Performed by: NURSE PRACTITIONER

## 2024-10-22 RX ORDER — BUPROPION HYDROCHLORIDE 300 MG/1
300 TABLET ORAL DAILY
Qty: 90 TABLET | Refills: 1 | Status: SHIPPED | OUTPATIENT
Start: 2024-10-22

## 2024-10-22 RX ORDER — ASPIRIN 81 MG/1
81 TABLET ORAL DAILY
Start: 2024-10-22

## 2024-10-22 RX ORDER — AZITHROMYCIN 250 MG/1
TABLET, FILM COATED ORAL
Qty: 6 TABLET | Refills: 0 | Status: SHIPPED | OUTPATIENT
Start: 2024-10-22

## 2024-10-22 RX ORDER — DEXTROMETHORPHAN HYDROBROMIDE AND PROMETHAZINE HYDROCHLORIDE 15; 6.25 MG/5ML; MG/5ML
5 SYRUP ORAL 4 TIMES DAILY PRN
Qty: 240 ML | Refills: 0 | Status: SHIPPED | OUTPATIENT
Start: 2024-10-22

## 2024-10-22 NOTE — PROGRESS NOTES
Subjective   Chief Complaint   Patient presents with    Med. follow up        Jamaica Shell is a 58 y.o. female.    History of Present Illness  The patient presents for evaluation of multiple medical concerns.    She is currently on spironolactone and Lasix, but has not yet started her new medications. She is also taking aspirin. She reports no swelling today and her stomach is softer than usual. Her weight is 140 pounds. She has been advised to avoid grapefruit due to its interaction with many medications.    She is experiencing anxiety and depression, which she attributes to her son's impending rehabilitation. She is currently on Wellbutrin 150 mg but is considering increasing the dosage to 300 mg.    She is experiencing hearing difficulties due to earwax buildup. The wax is not hard, but it is located near the eardrum in both ears.    She has been experiencing a cough for the past week which occasionally produces phlegm.  Cough has progressively worsened and she is feeling fatigued and tired.    FAMILY HISTORY  Her mother is diabetic.    I have reviewed the following portions of the patient's history and confirmed they are accurate: allergies, current medications, past family history, past medical history, past social history, past surgical history, and problem list    Review of Systems  Pertinent items are noted in HPI.     Current Outpatient Medications on File Prior to Visit   Medication Sig    albuterol sulfate HFA (Ventolin HFA) 108 (90 Base) MCG/ACT inhaler INHALE TWO PUFFS BY MOUTH EVERY 4 TO 6 HOURS AS NEEDED FOR COUGH ,  SHORTNESS OF BREATH , OR WHEEZING    atorvastatin (Lipitor) 80 MG tablet Take 1 tablet by mouth Daily.    baclofen (LIORESAL) 10 MG tablet Take 1 tablet by mouth 3 (Three) Times a Day As Needed for Muscle Spasms.    bisoprolol (ZEBeta) 10 MG tablet Take 1 tablet by mouth Daily.    budesonide (PULMICORT) 0.5 MG/2ML nebulizer solution Inhale 2 mL.    busPIRone (BUSPAR) 5 MG tablet  Take 1-2 tablets by mouth three times daily as needed for anxiety.    chlorhexidine (PERIDEX) 0.12 % solution     Denta 5000 Plus 1.1 % cream     Diclofenac Sodium (VOLTAREN) 1 % gel gel APPLY ONE GRAM TOPICALLY TO INDICATED AREA FOUR TIMES A DAY AS NEEDED FOR MILD TO MODERATE PAIN    empagliflozin (Jardiance) 10 MG tablet tablet Take 1 tablet by mouth Daily. Need f/u appt for further refills.    FeroSul 325 (65 Fe) MG tablet TAKE ONE TABLET BY MOUTH DAILY WITH BREAKFAST    FLUoxetine (PROzac) 20 MG capsule TAKE THREE CAPSULES BY MOUTH DAILY    folic acid (FOLVITE) 1 MG tablet Take 1 tablet by mouth Daily.    furosemide (Lasix) 40 MG tablet Take 1 tablet by mouth Daily.    loratadine (CLARITIN) 10 MG tablet Take 1 tablet by mouth Daily.    mirtazapine (REMERON) 7.5 MG tablet TAKE ONE TABLET BY MOUTH ONCE NIGHTLY    montelukast (SINGULAIR) 10 MG tablet TAKE ONE TABLET BY MOUTH ONCE NIGHTLY    multivitamin (Multiple Vitamin) tablet tablet Take 1 tablet by mouth Daily.    nicotine (Nicoderm CQ) 21 MG/24HR patch Place 1 patch on the skin as directed by provider Daily.    omeprazole (priLOSEC) 20 MG capsule TAKE 1 CAPSULE BY MOUTH DAILY    ondansetron ODT (ZOFRAN-ODT) 4 MG disintegrating tablet Place 1 tablet on the tongue Every 6 (Six) Hours As Needed for Nausea or Vomiting.    oxybutynin XL (DITROPAN-XL) 10 MG 24 hr tablet TAKE 1 TABLET BY MOUTH DAILY    potassium chloride ER (K-TAB) 20 MEQ tablet controlled-release ER tablet Take 1 tablet by mouth Daily.    sacubitril-valsartan (ENTRESTO)  MG tablet Take 1 tablet by mouth 2 (Two) Times a Day. Need f/u appt for further refills.    spironolactone (Aldactone) 25 MG tablet Take 1 tablet by mouth Daily.    traMADol (ULTRAM) 50 MG tablet Take 1 tablet by mouth Every 6 (Six) Hours As Needed for Moderate Pain.     No current facility-administered medications on file prior to visit.       Objective   Vitals:    10/22/24 0845   BP: 126/84   BP Location: Left arm   Patient  "Position: Sitting   Cuff Size: Adult   Pulse: 71   Temp: 97.8 °F (36.6 °C)   SpO2: 97%   Weight: 63.5 kg (140 lb)   Height: 157.5 cm (62.01\")     Body mass index is 25.6 kg/m².    Physical Exam  Vitals reviewed.   Constitutional:       Appearance: Normal appearance. She is well-developed.   HENT:      Head: Normocephalic and atraumatic.      Right Ear: There is impacted cerumen.      Left Ear: There is impacted cerumen.      Ears:      Comments: Small amount of loose earwax impacted against tympanic membranes bilaterally.     Nose: Nose normal. Mucosal edema and congestion present.      Right Sinus: Maxillary sinus tenderness and frontal sinus tenderness present.      Left Sinus: Maxillary sinus tenderness and frontal sinus tenderness present.      Mouth/Throat:      Pharynx: Posterior oropharyngeal erythema present.   Eyes:      General: Lids are normal.      Conjunctiva/sclera: Conjunctivae normal.      Pupils: Pupils are equal, round, and reactive to light.   Neck:      Thyroid: No thyromegaly.      Trachea: Trachea normal.   Cardiovascular:      Rate and Rhythm: Normal rate and regular rhythm.      Heart sounds: Normal heart sounds.   Pulmonary:      Effort: Pulmonary effort is normal. No respiratory distress.      Breath sounds: Normal breath sounds. Rhonchi present.   Skin:     General: Skin is warm and dry.   Neurological:      Mental Status: She is alert and oriented to person, place, and time.      GCS: GCS eye subscore is 4. GCS verbal subscore is 5. GCS motor subscore is 6.   Psychiatric:         Attention and Perception: Attention normal.         Mood and Affect: Mood normal.         Speech: Speech normal.         Behavior: Behavior normal. Behavior is cooperative.         Thought Content: Thought content normal.         Results  Laboratory Studies  A1c at 5.9 showing borderline diabetes.  COVID and flu test negative.    Assessment & Plan   Problem List Items Addressed This Visit       Borderline " diabetes     Other Visit Diagnoses       Anxiety and depression    -  Primary    Relevant Medications    buPROPion XL (Wellbutrin XL) 300 MG 24 hr tablet    Respiratory infection        Relevant Medications    azithromycin (ZITHROMAX) 250 MG tablet    Other Relevant Orders    Covid-19 + Flu A&B AG, Veritor (Completed)    Bilateral impacted cerumen                   Current Outpatient Medications:     albuterol sulfate HFA (Ventolin HFA) 108 (90 Base) MCG/ACT inhaler, INHALE TWO PUFFS BY MOUTH EVERY 4 TO 6 HOURS AS NEEDED FOR COUGH ,  SHORTNESS OF BREATH , OR WHEEZING, Disp: 18 g, Rfl: 0    aspirin 81 MG EC tablet, Take 1 tablet by mouth Daily., Disp: , Rfl:     atorvastatin (Lipitor) 80 MG tablet, Take 1 tablet by mouth Daily., Disp: 30 tablet, Rfl: 5    baclofen (LIORESAL) 10 MG tablet, Take 1 tablet by mouth 3 (Three) Times a Day As Needed for Muscle Spasms., Disp: 90 tablet, Rfl: 1    bisoprolol (ZEBeta) 10 MG tablet, Take 1 tablet by mouth Daily., Disp: 90 tablet, Rfl: 3    budesonide (PULMICORT) 0.5 MG/2ML nebulizer solution, Inhale 2 mL., Disp: , Rfl:     busPIRone (BUSPAR) 5 MG tablet, Take 1-2 tablets by mouth three times daily as needed for anxiety., Disp: 90 tablet, Rfl: 2    chlorhexidine (PERIDEX) 0.12 % solution, , Disp: , Rfl:     Denta 5000 Plus 1.1 % cream, , Disp: , Rfl:     Diclofenac Sodium (VOLTAREN) 1 % gel gel, APPLY ONE GRAM TOPICALLY TO INDICATED AREA FOUR TIMES A DAY AS NEEDED FOR MILD TO MODERATE PAIN, Disp: 100 g, Rfl: 2    empagliflozin (Jardiance) 10 MG tablet tablet, Take 1 tablet by mouth Daily. Need f/u appt for further refills., Disp: 30 tablet, Rfl: 0    FeroSul 325 (65 Fe) MG tablet, TAKE ONE TABLET BY MOUTH DAILY WITH BREAKFAST, Disp: 30 tablet, Rfl: 5    FLUoxetine (PROzac) 20 MG capsule, TAKE THREE CAPSULES BY MOUTH DAILY, Disp: 90 capsule, Rfl: 1    folic acid (FOLVITE) 1 MG tablet, Take 1 tablet by mouth Daily., Disp: 90 tablet, Rfl: 3    furosemide (Lasix) 40 MG tablet, Take  1 tablet by mouth Daily., Disp: 30 tablet, Rfl: 0    loratadine (CLARITIN) 10 MG tablet, Take 1 tablet by mouth Daily., Disp: 30 tablet, Rfl: 5    mirtazapine (REMERON) 7.5 MG tablet, TAKE ONE TABLET BY MOUTH ONCE NIGHTLY, Disp: 30 tablet, Rfl: 1    montelukast (SINGULAIR) 10 MG tablet, TAKE ONE TABLET BY MOUTH ONCE NIGHTLY, Disp: 90 tablet, Rfl: 1    multivitamin (Multiple Vitamin) tablet tablet, Take 1 tablet by mouth Daily., Disp: 30 tablet, Rfl: 5    nicotine (Nicoderm CQ) 21 MG/24HR patch, Place 1 patch on the skin as directed by provider Daily., Disp: 28 each, Rfl: 1    omeprazole (priLOSEC) 20 MG capsule, TAKE 1 CAPSULE BY MOUTH DAILY, Disp: 30 capsule, Rfl: 5    ondansetron ODT (ZOFRAN-ODT) 4 MG disintegrating tablet, Place 1 tablet on the tongue Every 6 (Six) Hours As Needed for Nausea or Vomiting., Disp: 30 tablet, Rfl: 1    oxybutynin XL (DITROPAN-XL) 10 MG 24 hr tablet, TAKE 1 TABLET BY MOUTH DAILY, Disp: 30 tablet, Rfl: 1    potassium chloride ER (K-TAB) 20 MEQ tablet controlled-release ER tablet, Take 1 tablet by mouth Daily., Disp: 90 tablet, Rfl: 1    sacubitril-valsartan (ENTRESTO)  MG tablet, Take 1 tablet by mouth 2 (Two) Times a Day. Need f/u appt for further refills., Disp: 60 tablet, Rfl: 2    spironolactone (Aldactone) 25 MG tablet, Take 1 tablet by mouth Daily., Disp: 90 tablet, Rfl: 1    traMADol (ULTRAM) 50 MG tablet, Take 1 tablet by mouth Every 6 (Six) Hours As Needed for Moderate Pain., Disp: 28 tablet, Rfl: 0    azithromycin (ZITHROMAX) 250 MG tablet, Take 2 tablets the first day, then 1 tablet daily for 4 days., Disp: 6 tablet, Rfl: 0    buPROPion XL (Wellbutrin XL) 300 MG 24 hr tablet, Take 1 tablet by mouth Daily., Disp: 90 tablet, Rfl: 1    promethazine-dextromethorphan (PROMETHAZINE-DM) 6.25-15 MG/5ML syrup, Take 5 mL by mouth 4 (Four) Times a Day As Needed for Cough., Disp: 240 mL, Rfl: 0    Assessment & Plan  Start azithromycin and Promethazine DM for respiratory infection.   Increase Wellbutrin for depression and anxiety.  Continue BuSpar and Prozac.  Patient will follow a low-carb and low sugar diet.  Repeat A1c in 3 months.  Patient will purchase Debrox earwax drops over-the-counter and use these daily while attempting to irrigate ears with water while showering.  If no improvement in cerumen impaction she will follow-up for in office ear irrigation.    Follow-up  Return in 2 months for follow up.         Plan of care reviewed with the patient at the conclusion of today's visit.  Education was provided regarding diagnosis, management, and any prescribed or recommended OTC medications.  Patient verbalized understanding of and agreement with management plan.     Return in about 2 months (around 12/22/2024), or if symptoms worsen or fail to improve, for Follow-up.      Transcribed from ambient dictation for MARTIN Ramirez by MARTIN Ramirez.  10/22/24   09:16 EDT    Patient or patient representative verbalized consent for the use of Ambient Listening during the visit with  MARTIN Ramirez for chart documentation. 10/27/2024  22:00 EDT

## 2024-10-28 DIAGNOSIS — Z13.0 SCREENING FOR BLOOD DISEASE: ICD-10-CM

## 2024-10-28 DIAGNOSIS — I10 ESSENTIAL HYPERTENSION: ICD-10-CM

## 2024-10-28 RX ORDER — AMLODIPINE BESYLATE 5 MG/1
5 TABLET ORAL DAILY
Qty: 30 TABLET | Refills: 1 | OUTPATIENT
Start: 2024-10-28

## 2024-10-30 RX ORDER — FERROUS SULFATE 325(65) MG
1 TABLET ORAL
Qty: 30 TABLET | Refills: 5 | Status: SHIPPED | OUTPATIENT
Start: 2024-10-30

## 2024-11-16 DIAGNOSIS — I10 ESSENTIAL HYPERTENSION: ICD-10-CM

## 2024-11-16 DIAGNOSIS — I50.22 CHRONIC HFREF (HEART FAILURE WITH REDUCED EJECTION FRACTION): ICD-10-CM

## 2024-11-18 RX ORDER — SACUBITRIL AND VALSARTAN 97; 103 MG/1; MG/1
TABLET, FILM COATED ORAL
Qty: 60 TABLET | Refills: 2 | Status: SHIPPED | OUTPATIENT
Start: 2024-11-18

## 2024-11-18 RX ORDER — FUROSEMIDE 40 MG/1
40 TABLET ORAL DAILY
Qty: 30 TABLET | Refills: 3 | Status: SHIPPED | OUTPATIENT
Start: 2024-11-18

## 2024-11-25 RX ORDER — NICOTINE 21 MG/24HR
PATCH, TRANSDERMAL 24 HOURS TRANSDERMAL
Qty: 28 PATCH | Refills: 0 | Status: SHIPPED | OUTPATIENT
Start: 2024-11-25

## 2024-12-01 DIAGNOSIS — F51.01 PRIMARY INSOMNIA: ICD-10-CM

## 2024-12-01 DIAGNOSIS — N39.46 MIXED STRESS AND URGE URINARY INCONTINENCE: ICD-10-CM

## 2024-12-02 RX ORDER — MIRTAZAPINE 7.5 MG/1
7.5 TABLET, FILM COATED ORAL NIGHTLY
Qty: 30 TABLET | Refills: 3 | Status: SHIPPED | OUTPATIENT
Start: 2024-12-02

## 2024-12-02 RX ORDER — OXYBUTYNIN CHLORIDE 10 MG/1
10 TABLET, EXTENDED RELEASE ORAL DAILY
Qty: 30 TABLET | Refills: 3 | Status: SHIPPED | OUTPATIENT
Start: 2024-12-02

## 2024-12-02 RX ORDER — BUSPIRONE HYDROCHLORIDE 5 MG/1
TABLET ORAL
Qty: 90 TABLET | Refills: 3 | Status: SHIPPED | OUTPATIENT
Start: 2024-12-02

## 2024-12-13 ENCOUNTER — APPOINTMENT (OUTPATIENT)
Dept: MRI IMAGING | Facility: HOSPITAL | Age: 59
End: 2024-12-13
Payer: MEDICAID

## 2024-12-13 ENCOUNTER — HOSPITAL ENCOUNTER (OUTPATIENT)
Dept: CARDIOLOGY | Facility: HOSPITAL | Age: 59
Discharge: HOME | End: 2024-12-13
Payer: MEDICAID

## 2024-12-13 VITALS — WEIGHT: 140 LBS | HEIGHT: 62 IN | BODY MASS INDEX: 25.76 KG/M2

## 2024-12-13 DIAGNOSIS — R60.0 LEG EDEMA: ICD-10-CM

## 2024-12-13 LAB
BH CV LEFT LOWER VAS GSV BELOW KNEE REFLUX TIME: 0 SEC
BH CV LEFT LOWER VAS GSV KNEE REFLUX TIME: 0 SEC
BH CV LEFT LOWER VAS GSV KNEE TRANSVERSE DIAMETER: 0.2 CM
BH CV LEFT LOWER VAS GSV PROX REFLUX TIME: 0 SEC
BH CV LEFT LOWER VAS GSV PROX TRANSVERSE DIAMETER: 0.2 CM
BH CV LEFT LOWER VAS GSVBELOW KNEE TRANSVERSE DIAMETER: 0.9 CM
BH CV LEFT LOWER VAS SAPHENOFEMORAL JUNCTION REFLUX TIME: 0 SEC
BH CV LEFT LOWER VAS SAPHENOFEMORAL JUNCTION TRANSVERSE DIAMETER: 0.4 CM
BH CV LEFT LOWER VAS SPJ REFLUX TIME: 0 SEC
BH CV LEFT LOWER VAS SPJ TRANS DIAMETER: 0.2 CM
BH CV LEFT LOWER VAS SSV MID CALF REFLUX TIME: 0 SEC
BH CV LEFT LOWER VAS SSV MID CALF TRANS DIAMETER: 0.1 CM
BH CV LEFT LOWER VAS SSV PROX CALF REFLUX TIME: 0 SEC
BH CV LEFT LOWER VAS SSV PROX CALF TRANS DIAMETER: 0.2 CM
BH CV LOWER VAS LEFT GSV DIST THIGH COMPRESSIBILTY: NORMAL
BH CV LOWER VAS RIGHT GSV DIST THIGH COMPRESSIBILTY: NORMAL
BH CV LOWER VASCULAR LEFT COMMON FEMORAL AUGMENT: NORMAL
BH CV LOWER VASCULAR LEFT COMMON FEMORAL COMPETENT: NORMAL
BH CV LOWER VASCULAR LEFT COMMON FEMORAL COMPRESS: NORMAL
BH CV LOWER VASCULAR LEFT COMMON FEMORAL PHASIC: NORMAL
BH CV LOWER VASCULAR LEFT COMMON FEMORAL SPONT: NORMAL
BH CV LOWER VASCULAR LEFT DISTAL FEMORAL COMPRESS: NORMAL
BH CV LOWER VASCULAR LEFT GREATER SAPH AK COMPETENT: NORMAL
BH CV LOWER VASCULAR LEFT GREATER SAPH AK COMPRESS: NORMAL
BH CV LOWER VASCULAR LEFT GREATER SAPH BK COMPETENT: NORMAL
BH CV LOWER VASCULAR LEFT GREATER SAPH BK COMPRESS: NORMAL
BH CV LOWER VASCULAR LEFT GSV DIST THIGH COMPETENT: NORMAL
BH CV LOWER VASCULAR LEFT LESSER SAPH COMPETENT: NORMAL
BH CV LOWER VASCULAR LEFT LESSER SAPH COMPRESS: NORMAL
BH CV LOWER VASCULAR LEFT MID FEMORAL AUGMENT: NORMAL
BH CV LOWER VASCULAR LEFT MID FEMORAL COMPETENT: NORMAL
BH CV LOWER VASCULAR LEFT MID FEMORAL COMPRESS: NORMAL
BH CV LOWER VASCULAR LEFT MID FEMORAL PHASIC: NORMAL
BH CV LOWER VASCULAR LEFT MID FEMORAL SPONT: NORMAL
BH CV LOWER VASCULAR LEFT PERONEAL COMPRESS: NORMAL
BH CV LOWER VASCULAR LEFT POPLITEAL AUGMENT: NORMAL
BH CV LOWER VASCULAR LEFT POPLITEAL COMPETENT: NORMAL
BH CV LOWER VASCULAR LEFT POPLITEAL COMPRESS: NORMAL
BH CV LOWER VASCULAR LEFT POPLITEAL PHASIC: NORMAL
BH CV LOWER VASCULAR LEFT POPLITEAL SPONT: NORMAL
BH CV LOWER VASCULAR LEFT POSTERIOR TIBIAL COMPRESS: NORMAL
BH CV LOWER VASCULAR LEFT PROXIMAL FEMORAL COMPRESS: NORMAL
BH CV LOWER VASCULAR LEFT SAPHENOFEMORAL JUNCTION AUGMENT: NORMAL
BH CV LOWER VASCULAR LEFT SAPHENOFEMORAL JUNCTION COMPETENT: NORMAL
BH CV LOWER VASCULAR LEFT SAPHENOFEMORAL JUNCTION COMPRESS: NORMAL
BH CV LOWER VASCULAR LEFT SAPHENOFEMORAL JUNCTION PHASIC: NORMAL
BH CV LOWER VASCULAR LEFT SAPHENOFEMORAL JUNCTION SPONT: NORMAL
BH CV LOWER VASCULAR LEFT SAPHENOPOP JX AUGMENT: NORMAL
BH CV LOWER VASCULAR LEFT SAPHENOPOP JX COMPETENT: NORMAL
BH CV LOWER VASCULAR LEFT SAPHENOPOP JX COMPRESS: NORMAL
BH CV LOWER VASCULAR LEFT SAPHENOPOP JX PHASIC: NORMAL
BH CV LOWER VASCULAR LEFT SAPHENOPOP JX SPONT: NORMAL
BH CV LOWER VASCULAR LEFT SSV MID CALF COMPETENT: NORMAL
BH CV LOWER VASCULAR LEFT SSV MID CALF COMPRESS: NORMAL
BH CV LOWER VASCULAR RIGHT COMMON FEMORAL AUGMENT: NORMAL
BH CV LOWER VASCULAR RIGHT COMMON FEMORAL COMPETENT: NORMAL
BH CV LOWER VASCULAR RIGHT COMMON FEMORAL COMPRESS: NORMAL
BH CV LOWER VASCULAR RIGHT COMMON FEMORAL PHASIC: NORMAL
BH CV LOWER VASCULAR RIGHT COMMON FEMORAL SPONT: NORMAL
BH CV LOWER VASCULAR RIGHT DISTAL FEMORAL COMPRESS: NORMAL
BH CV LOWER VASCULAR RIGHT GREATER SAPH AK COMPETENT: NORMAL
BH CV LOWER VASCULAR RIGHT GREATER SAPH BK COMPETENT: NORMAL
BH CV LOWER VASCULAR RIGHT GREATER SAPH BK COMPRESS: NORMAL
BH CV LOWER VASCULAR RIGHT GSV DIST THIGH COMPETENT: NORMAL
BH CV LOWER VASCULAR RIGHT LESSER SAPH COMPETENT: NORMAL
BH CV LOWER VASCULAR RIGHT LESSER SAPH COMPRESS: NORMAL
BH CV LOWER VASCULAR RIGHT MID FEMORAL AUGMENT: NORMAL
BH CV LOWER VASCULAR RIGHT MID FEMORAL COMPETENT: NORMAL
BH CV LOWER VASCULAR RIGHT MID FEMORAL COMPRESS: NORMAL
BH CV LOWER VASCULAR RIGHT MID FEMORAL PHASIC: NORMAL
BH CV LOWER VASCULAR RIGHT MID FEMORAL SPONT: NORMAL
BH CV LOWER VASCULAR RIGHT PERONEAL COMPRESS: NORMAL
BH CV LOWER VASCULAR RIGHT POPLITEAL AUGMENT: NORMAL
BH CV LOWER VASCULAR RIGHT POPLITEAL COMPETENT: NORMAL
BH CV LOWER VASCULAR RIGHT POPLITEAL COMPRESS: NORMAL
BH CV LOWER VASCULAR RIGHT POPLITEAL PHASIC: NORMAL
BH CV LOWER VASCULAR RIGHT POPLITEAL SPONT: NORMAL
BH CV LOWER VASCULAR RIGHT POSTERIOR TIBIAL COMPRESS: NORMAL
BH CV LOWER VASCULAR RIGHT PROXIMAL FEMORAL COMPRESS: NORMAL
BH CV LOWER VASCULAR RIGHT SAPHENOFEMORAL JUNCTION AUGMENT: NORMAL
BH CV LOWER VASCULAR RIGHT SAPHENOFEMORAL JUNCTION COMPETENT: NORMAL
BH CV LOWER VASCULAR RIGHT SAPHENOFEMORAL JUNCTION COMPRESS: NORMAL
BH CV LOWER VASCULAR RIGHT SAPHENOFEMORAL JUNCTION PHASIC: NORMAL
BH CV LOWER VASCULAR RIGHT SAPHENOFEMORAL JUNCTION SPONT: NORMAL
BH CV LOWER VASCULAR RIGHT SAPHENOPOP JX AUGMENT: NORMAL
BH CV LOWER VASCULAR RIGHT SAPHENOPOP JX COMPETENT: NORMAL
BH CV LOWER VASCULAR RIGHT SAPHENOPOP JX COMPRESS: NORMAL
BH CV LOWER VASCULAR RIGHT SAPHENOPOP JX PHASIC: NORMAL
BH CV LOWER VASCULAR RIGHT SAPHENOPOP JX SPONT: NORMAL
BH CV LOWER VASCULAR RIGHT SSV MID CALF COMPETENT: NORMAL
BH CV LOWER VASCULAR RIGHT SSV MID CALF COMPRESS: NORMAL
BH CV RIGHT LOWER VAS GSV KNEE REFLUX TIME: 0 SEC
BH CV RIGHT LOWER VAS GSV KNEE TRANS DIAMETER: 0.1 CM
BH CV RIGHT LOWER VAS GSV PROX CALF REFLUX TIME: 0 SEC
BH CV RIGHT LOWER VAS GSV PROX CALF TRANS DIAMETER: 0.1 CM
BH CV RIGHT LOWER VAS GSV PROX THIGH REFLUX TIME: 0 SEC
BH CV RIGHT LOWER VAS GSV PROX THIGH TRANS DIAMETER: 0.3 CM
BH CV RIGHT LOWER VAS SAPHENOFEM JUNCTION REFLUX TIME: 0 SEC
BH CV RIGHT LOWER VAS SAPHENOFEM JUNCTION TRANSVERSE DIAMETER: 0.4 CM
BH CV RIGHT LOWER VAS SPJ REFLUX TIME: 0 SEC
BH CV RIGHT LOWER VAS SPJ TRANS DIAMETER: 0.4 CM
BH CV RIGHT LOWER VAS SSV MID CALF REFLUX TIME: 0 SEC
BH CV RIGHT LOWER VAS SSV MID CALF TRANS DIAMETER: 0.2 CM
BH CV RIGHT LOWER VAS SSV PROX CALF REFLUX TIME: 0 SEC
BH CV RIGHT LOWER VAS SSV PROX CALF TRANS DIAMETER: 0.3 CM
BH CV VAS RIGHT GSV PROXIMAL HIDDEN LRR COMPRESSIBILTY: NORMAL

## 2024-12-13 PROCEDURE — 93970 EXTREMITY STUDY: CPT

## 2024-12-17 ENCOUNTER — LAB (OUTPATIENT)
Dept: INTERNAL MEDICINE | Facility: CLINIC | Age: 59
End: 2024-12-17
Payer: MEDICAID

## 2024-12-17 ENCOUNTER — OFFICE VISIT (OUTPATIENT)
Dept: INTERNAL MEDICINE | Facility: CLINIC | Age: 59
End: 2024-12-17
Payer: MEDICAID

## 2024-12-17 VITALS
TEMPERATURE: 97.5 F | HEIGHT: 62 IN | DIASTOLIC BLOOD PRESSURE: 74 MMHG | WEIGHT: 149.6 LBS | BODY MASS INDEX: 27.53 KG/M2 | SYSTOLIC BLOOD PRESSURE: 122 MMHG | OXYGEN SATURATION: 97 % | HEART RATE: 84 BPM

## 2024-12-17 DIAGNOSIS — R10.31 RIGHT LOWER QUADRANT ABDOMINAL PAIN: ICD-10-CM

## 2024-12-17 DIAGNOSIS — M79.605 PAIN IN BOTH LOWER EXTREMITIES: ICD-10-CM

## 2024-12-17 DIAGNOSIS — I70.0 ATHEROSCLEROSIS OF AORTA: ICD-10-CM

## 2024-12-17 DIAGNOSIS — F10.11 ALCOHOL ABUSE, IN REMISSION: ICD-10-CM

## 2024-12-17 DIAGNOSIS — L81.9 DISCOLORATION OF SKIN: ICD-10-CM

## 2024-12-17 DIAGNOSIS — M79.89 LEG SWELLING: ICD-10-CM

## 2024-12-17 DIAGNOSIS — M25.551 PELVIC JOINT PAIN, RIGHT: ICD-10-CM

## 2024-12-17 DIAGNOSIS — H61.23 BILATERAL IMPACTED CERUMEN: ICD-10-CM

## 2024-12-17 DIAGNOSIS — R52 PAIN AGGRAVATED BY WALKING: ICD-10-CM

## 2024-12-17 DIAGNOSIS — R14.0 ABDOMINAL DISTENSION: Primary | ICD-10-CM

## 2024-12-17 DIAGNOSIS — I50.22 CHRONIC HFREF (HEART FAILURE WITH REDUCED EJECTION FRACTION): ICD-10-CM

## 2024-12-17 DIAGNOSIS — M79.604 PAIN IN BOTH LOWER EXTREMITIES: ICD-10-CM

## 2024-12-17 DIAGNOSIS — R19.00 ABDOMINAL SWELLING: ICD-10-CM

## 2024-12-17 LAB
ALBUMIN SERPL-MCNC: 3.9 G/DL (ref 3.5–5.2)
ALBUMIN/GLOB SERPL: 1.4 G/DL
ALP SERPL-CCNC: 114 U/L (ref 39–117)
ALT SERPL W P-5'-P-CCNC: 19 U/L (ref 1–33)
ANION GAP SERPL CALCULATED.3IONS-SCNC: 16 MMOL/L (ref 5–15)
AST SERPL-CCNC: 19 U/L (ref 1–32)
BASOPHILS # BLD AUTO: 0.04 10*3/MM3 (ref 0–0.2)
BASOPHILS NFR BLD AUTO: 0.6 % (ref 0–1.5)
BILIRUB SERPL-MCNC: <0.2 MG/DL (ref 0–1.2)
BUN SERPL-MCNC: 16 MG/DL (ref 6–20)
BUN/CREAT SERPL: 11.5 (ref 7–25)
CALCIUM SPEC-SCNC: 9.3 MG/DL (ref 8.6–10.5)
CHLORIDE SERPL-SCNC: 107 MMOL/L (ref 98–107)
CO2 SERPL-SCNC: 19 MMOL/L (ref 22–29)
CREAT SERPL-MCNC: 1.39 MG/DL (ref 0.57–1)
DEPRECATED RDW RBC AUTO: 40.4 FL (ref 37–54)
EGFRCR SERPLBLD CKD-EPI 2021: 44.4 ML/MIN/1.73
EOSINOPHIL # BLD AUTO: 0.1 10*3/MM3 (ref 0–0.4)
EOSINOPHIL NFR BLD AUTO: 1.6 % (ref 0.3–6.2)
ERYTHROCYTE [DISTWIDTH] IN BLOOD BY AUTOMATED COUNT: 14.9 % (ref 12.3–15.4)
GLOBULIN UR ELPH-MCNC: 2.8 GM/DL
GLUCOSE SERPL-MCNC: 113 MG/DL (ref 65–99)
HCT VFR BLD AUTO: 39.1 % (ref 34–46.6)
HGB BLD-MCNC: 12.9 G/DL (ref 12–15.9)
IMM GRANULOCYTES # BLD AUTO: 0.02 10*3/MM3 (ref 0–0.05)
IMM GRANULOCYTES NFR BLD AUTO: 0.3 % (ref 0–0.5)
LIPASE SERPL-CCNC: 36 U/L (ref 13–60)
LYMPHOCYTES # BLD AUTO: 2 10*3/MM3 (ref 0.7–3.1)
LYMPHOCYTES NFR BLD AUTO: 31.4 % (ref 19.6–45.3)
MCH RBC QN AUTO: 25.3 PG (ref 26.6–33)
MCHC RBC AUTO-ENTMCNC: 33 G/DL (ref 31.5–35.7)
MCV RBC AUTO: 76.8 FL (ref 79–97)
MONOCYTES # BLD AUTO: 0.5 10*3/MM3 (ref 0.1–0.9)
MONOCYTES NFR BLD AUTO: 7.9 % (ref 5–12)
NEUTROPHILS NFR BLD AUTO: 3.7 10*3/MM3 (ref 1.7–7)
NEUTROPHILS NFR BLD AUTO: 58.2 % (ref 42.7–76)
NRBC BLD AUTO-RTO: 0 /100 WBC (ref 0–0.2)
NT-PROBNP SERPL-MCNC: 293 PG/ML (ref 0–900)
PLATELET # BLD AUTO: 354 10*3/MM3 (ref 140–450)
PMV BLD AUTO: 9.7 FL (ref 6–12)
POTASSIUM SERPL-SCNC: 4.5 MMOL/L (ref 3.5–5.2)
PROT SERPL-MCNC: 6.7 G/DL (ref 6–8.5)
RBC # BLD AUTO: 5.09 10*6/MM3 (ref 3.77–5.28)
SODIUM SERPL-SCNC: 142 MMOL/L (ref 136–145)
WBC NRBC COR # BLD AUTO: 6.36 10*3/MM3 (ref 3.4–10.8)

## 2024-12-17 PROCEDURE — 80053 COMPREHEN METABOLIC PANEL: CPT | Performed by: NURSE PRACTITIONER

## 2024-12-17 PROCEDURE — 83690 ASSAY OF LIPASE: CPT | Performed by: NURSE PRACTITIONER

## 2024-12-17 PROCEDURE — 36415 COLL VENOUS BLD VENIPUNCTURE: CPT | Performed by: NURSE PRACTITIONER

## 2024-12-17 PROCEDURE — 85025 COMPLETE CBC W/AUTO DIFF WBC: CPT | Performed by: NURSE PRACTITIONER

## 2024-12-17 PROCEDURE — 83880 ASSAY OF NATRIURETIC PEPTIDE: CPT | Performed by: NURSE PRACTITIONER

## 2024-12-20 RX ORDER — DEXTROMETHORPHAN HYDROBROMIDE AND PROMETHAZINE HYDROCHLORIDE 15; 6.25 MG/5ML; MG/5ML
SYRUP ORAL
Qty: 240 ML | Refills: 0 | Status: SHIPPED | OUTPATIENT
Start: 2024-12-20

## 2024-12-23 NOTE — PROGRESS NOTES
Subjective   Chief Complaint   Patient presents with    Ear Fullness        Jamaica Shell is a 59 y.o. female.    History of Present Illness  The patient presents for evaluation of hearing loss, abdominal distention, and leg pain.    She reports a progressive deterioration in her auditory perception, describing it as akin to shouting. She has been utilizing ear drops as part of her treatment regimen.    She is currently on Lasix and spironolactone, which have been effective in managing her urinary output. She reports no lower extremity edema but experiences a sensation of bloating in her abdominal region. Her bowel movements are regular, and she does not experience any abdominal pain. She underwent a paracentesis procedure in 10/2023 at [facility]. She has not had any imaging studies of her liver and has not consulted with a gastroenterologist. She reports a gradual worsening of her symptoms, with her weight now at 140 pounds. She also reports difficulty in ambulation due to dyspnea.    She reports intermittent leg pain, which is not severe. She experiences pain during ambulation, necessitating frequent rest periods. She also reports spontaneous bruising on her legs.    She has been prescribed cough medicine, which she is nearing depletion. She was treated with antibiotics approximately 2 months ago.        I have reviewed the following portions of the patient's history and confirmed they are accurate: allergies, current medications, past family history, past medical history, past social history, past surgical history, and problem list    Review of Systems  Pertinent items are noted in HPI.     Current Outpatient Medications on File Prior to Visit   Medication Sig    albuterol sulfate HFA (Ventolin HFA) 108 (90 Base) MCG/ACT inhaler INHALE TWO PUFFS BY MOUTH EVERY 4 TO 6 HOURS AS NEEDED FOR COUGH ,  SHORTNESS OF BREATH , OR WHEEZING    busPIRone (BUSPAR) 5 MG tablet TAKE ONE TO TWO TABLETS BY MOUTH  THREE TIMES A DAY AS NEEDED FOR ANXIETY    FLUoxetine (PROzac) 20 MG capsule TAKE THREE CAPSULES BY MOUTH DAILY    mirtazapine (REMERON) 7.5 MG tablet TAKE ONE TABLET BY MOUTH ONCE NIGHTLY    oxybutynin XL (DITROPAN-XL) 10 MG 24 hr tablet TAKE 1 TABLET BY MOUTH DAILY    aspirin 81 MG EC tablet Take 1 tablet by mouth Daily.    atorvastatin (Lipitor) 80 MG tablet Take 1 tablet by mouth Daily.    azithromycin (ZITHROMAX) 250 MG tablet Take 2 tablets the first day, then 1 tablet daily for 4 days.    baclofen (LIORESAL) 10 MG tablet Take 1 tablet by mouth 3 (Three) Times a Day As Needed for Muscle Spasms.    bisoprolol (ZEBeta) 10 MG tablet Take 1 tablet by mouth Daily.    buPROPion XL (Wellbutrin XL) 300 MG 24 hr tablet Take 1 tablet by mouth Daily.    chlorhexidine (PERIDEX) 0.12 % solution     Denta 5000 Plus 1.1 % cream     Diclofenac Sodium (VOLTAREN) 1 % gel gel APPLY ONE GRAM TOPICALLY TO INDICATED AREA FOUR TIMES A DAY AS NEEDED FOR MILD TO MODERATE PAIN    empagliflozin (Jardiance) 10 MG tablet tablet Take 1 tablet by mouth Daily. Need f/u appt for further refills.    Entresto  MG tablet TAKE 1 TABLET BY MOUTH 2 TIMES A DAY *NEED FOLLOW-UP APPOINTMENT FOR FURTHER REFILLS*    FeroSul 325 (65 Fe) MG tablet TAKE 1 TABLET BY MOUTH DAILY WITH BREAKFAST    folic acid (FOLVITE) 1 MG tablet Take 1 tablet by mouth Daily.    furosemide (LASIX) 40 MG tablet TAKE 1 TABLET BY MOUTH DAILY    loratadine (CLARITIN) 10 MG tablet Take 1 tablet by mouth Daily.    montelukast (SINGULAIR) 10 MG tablet TAKE ONE TABLET BY MOUTH ONCE NIGHTLY    multivitamin (Multiple Vitamin) tablet tablet Take 1 tablet by mouth Daily.    nicotine (NICODERM CQ) 21 MG/24HR patch APPLY 1 PATCH TO THE SKIN DAILY AS DIRECTED BY PROVIDER    omeprazole (priLOSEC) 20 MG capsule TAKE 1 CAPSULE BY MOUTH DAILY    ondansetron ODT (ZOFRAN-ODT) 4 MG disintegrating tablet Place 1 tablet on the tongue Every 6 (Six) Hours As Needed for Nausea or Vomiting.     "potassium chloride ER (K-TAB) 20 MEQ tablet controlled-release ER tablet Take 1 tablet by mouth Daily.    spironolactone (Aldactone) 25 MG tablet Take 1 tablet by mouth Daily.    traMADol (ULTRAM) 50 MG tablet Take 1 tablet by mouth Every 6 (Six) Hours As Needed for Moderate Pain.     No current facility-administered medications on file prior to visit.       Objective   Vitals:    12/17/24 0937   BP: 122/74   BP Location: Left arm   Patient Position: Sitting   Cuff Size: Adult   Pulse: 84   Temp: 97.5 °F (36.4 °C)   SpO2: 97%   Weight: 67.9 kg (149 lb 9.6 oz)   Height: 157.5 cm (62.01\")     Body mass index is 27.36 kg/m².    Physical Exam  Vitals reviewed.   Constitutional:       Appearance: Normal appearance. She is well-developed.   HENT:      Head: Normocephalic and atraumatic.      Right Ear: There is impacted cerumen.      Left Ear: There is impacted cerumen.      Ears:      Comments: Normal TM and canals bilaterally post irrigation.      Nose: Nose normal.   Eyes:      General: Lids are normal.      Conjunctiva/sclera: Conjunctivae normal.      Pupils: Pupils are equal, round, and reactive to light.   Neck:      Thyroid: No thyromegaly.      Trachea: Trachea normal.   Cardiovascular:      Rate and Rhythm: Normal rate and regular rhythm.      Heart sounds: Normal heart sounds.   Pulmonary:      Effort: Pulmonary effort is normal. No respiratory distress.      Breath sounds: Normal breath sounds.   Abdominal:      General: Bowel sounds are normal. There is distension.      Palpations: Abdomen is soft.   Skin:     General: Skin is warm and dry.   Neurological:      Mental Status: She is alert and oriented to person, place, and time.      GCS: GCS eye subscore is 4. GCS verbal subscore is 5. GCS motor subscore is 6.   Psychiatric:         Attention and Perception: Attention normal.         Mood and Affect: Mood normal.         Speech: Speech normal.         Behavior: Behavior normal. Behavior is cooperative.       "   Thought Content: Thought content normal.     Ear Cerumen Removal    Date/Time: 12/17/2024 9:42 AM    Performed by: Candace Cam APRN  Authorized by: Candace Cam APRN  Consent: Verbal consent obtained.  Risks and benefits: risks, benefits and alternatives were discussed  Consent given by: patient  Patient understanding: patient states understanding of the procedure being performed  Location details: left ear and right ear  Patient tolerance: patient tolerated the procedure well with no immediate complications  Comments: Debrox drops applied prior to procedure. Warm solution of 1/2 peroxide and 1/2 water was used. Large amount of cerumen was removed bilaterally. Hearing immediately improved. Patient tolerated this well.     Procedure type: irrigation           Results      Lab Results   Component Value Date    WBC 6.36 12/17/2024    HGB 12.9 12/17/2024    HCT 39.1 12/17/2024    MCV 76.8 (L) 12/17/2024     12/17/2024      Lab Results   Component Value Date    GLUCOSE 113 (H) 12/17/2024    BUN 16 12/17/2024    CREATININE 1.39 (H) 12/17/2024     12/17/2024    K 4.5 12/17/2024     12/17/2024    CALCIUM 9.3 12/17/2024    PROTEINTOT 6.7 12/17/2024    ALBUMIN 3.9 12/17/2024    ALT 19 12/17/2024    AST 19 12/17/2024    ALKPHOS 114 12/17/2024    BILITOT <0.2 12/17/2024    GLOB 2.8 12/17/2024    AGRATIO 1.4 12/17/2024    BCR 11.5 12/17/2024    ANIONGAP 16.0 (H) 12/17/2024    EGFR 44.4 (L) 12/17/2024      Lab Results   Component Value Date    HGBA1C 5.90 (H) 10/14/2024      Lab Results   Component Value Date    CHOL 146 05/06/2024    TRIG 122 05/06/2024    HDL 49 05/06/2024    LDL 75 05/06/2024      Lab Results   Component Value Date    TSH 0.959 05/06/2024          Assessment & Plan   Problem List Items Addressed This Visit       Chronic HFrEF (heart failure with reduced ejection fraction)    Relevant Orders    CBC Auto Differential (Completed)    Comprehensive Metabolic Panel  (Completed)    proBNP (Completed)    Atherosclerosis of aorta    Relevant Orders    CT Abdomen Pelvis Without Contrast    Doppler Arterial Multi Level Lower Extremity - Bilateral CAR     Other Visit Diagnoses       Abdominal distension    -  Primary    Relevant Orders    CT Abdomen Pelvis Without Contrast    CBC Auto Differential (Completed)    Comprehensive Metabolic Panel (Completed)    proBNP (Completed)    Lipase (Completed)    Leg swelling        Relevant Orders    Doppler Arterial Multi Level Lower Extremity - Bilateral CAR    Right lower quadrant abdominal pain        Relevant Orders    CT Abdomen Pelvis Without Contrast    Bilateral impacted cerumen        Pelvic joint pain, right        Relevant Orders    CT Abdomen Pelvis Without Contrast    Abdominal swelling        Relevant Orders    CT Abdomen Pelvis Without Contrast    CBC Auto Differential (Completed)    Comprehensive Metabolic Panel (Completed)    proBNP (Completed)    Lipase (Completed)    Alcohol abuse, in remission        Relevant Orders    CT Abdomen Pelvis Without Contrast    Pain in both lower extremities        Relevant Orders    Doppler Arterial Multi Level Lower Extremity - Bilateral CAR    Pain aggravated by walking        Relevant Orders    Doppler Arterial Multi Level Lower Extremity - Bilateral CAR    Discoloration of skin        Relevant Orders    Doppler Arterial Multi Level Lower Extremity - Bilateral CAR               Current Outpatient Medications:     albuterol sulfate HFA (Ventolin HFA) 108 (90 Base) MCG/ACT inhaler, INHALE TWO PUFFS BY MOUTH EVERY 4 TO 6 HOURS AS NEEDED FOR COUGH ,  SHORTNESS OF BREATH , OR WHEEZING, Disp: 18 g, Rfl: 0    busPIRone (BUSPAR) 5 MG tablet, TAKE ONE TO TWO TABLETS BY MOUTH THREE TIMES A DAY AS NEEDED FOR ANXIETY, Disp: 90 tablet, Rfl: 3    FLUoxetine (PROzac) 20 MG capsule, TAKE THREE CAPSULES BY MOUTH DAILY, Disp: 90 capsule, Rfl: 3    mirtazapine (REMERON) 7.5 MG tablet, TAKE ONE TABLET BY MOUTH  ONCE NIGHTLY, Disp: 30 tablet, Rfl: 3    oxybutynin XL (DITROPAN-XL) 10 MG 24 hr tablet, TAKE 1 TABLET BY MOUTH DAILY, Disp: 30 tablet, Rfl: 3    aspirin 81 MG EC tablet, Take 1 tablet by mouth Daily., Disp: , Rfl:     atorvastatin (Lipitor) 80 MG tablet, Take 1 tablet by mouth Daily., Disp: 30 tablet, Rfl: 5    azithromycin (ZITHROMAX) 250 MG tablet, Take 2 tablets the first day, then 1 tablet daily for 4 days., Disp: 6 tablet, Rfl: 0    baclofen (LIORESAL) 10 MG tablet, Take 1 tablet by mouth 3 (Three) Times a Day As Needed for Muscle Spasms., Disp: 90 tablet, Rfl: 1    bisoprolol (ZEBeta) 10 MG tablet, Take 1 tablet by mouth Daily., Disp: 90 tablet, Rfl: 3    buPROPion XL (Wellbutrin XL) 300 MG 24 hr tablet, Take 1 tablet by mouth Daily., Disp: 90 tablet, Rfl: 1    chlorhexidine (PERIDEX) 0.12 % solution, , Disp: , Rfl:     Denta 5000 Plus 1.1 % cream, , Disp: , Rfl:     Diclofenac Sodium (VOLTAREN) 1 % gel gel, APPLY ONE GRAM TOPICALLY TO INDICATED AREA FOUR TIMES A DAY AS NEEDED FOR MILD TO MODERATE PAIN, Disp: 100 g, Rfl: 2    empagliflozin (Jardiance) 10 MG tablet tablet, Take 1 tablet by mouth Daily. Need f/u appt for further refills., Disp: 30 tablet, Rfl: 0    Entresto  MG tablet, TAKE 1 TABLET BY MOUTH 2 TIMES A DAY *NEED FOLLOW-UP APPOINTMENT FOR FURTHER REFILLS*, Disp: 60 tablet, Rfl: 2    FeroSul 325 (65 Fe) MG tablet, TAKE 1 TABLET BY MOUTH DAILY WITH BREAKFAST, Disp: 30 tablet, Rfl: 5    folic acid (FOLVITE) 1 MG tablet, Take 1 tablet by mouth Daily., Disp: 90 tablet, Rfl: 3    furosemide (LASIX) 40 MG tablet, TAKE 1 TABLET BY MOUTH DAILY, Disp: 30 tablet, Rfl: 3    loratadine (CLARITIN) 10 MG tablet, Take 1 tablet by mouth Daily., Disp: 30 tablet, Rfl: 5    montelukast (SINGULAIR) 10 MG tablet, TAKE ONE TABLET BY MOUTH ONCE NIGHTLY, Disp: 90 tablet, Rfl: 1    multivitamin (Multiple Vitamin) tablet tablet, Take 1 tablet by mouth Daily., Disp: 30 tablet, Rfl: 5    nicotine (NICODERM CQ) 21  MG/24HR patch, APPLY 1 PATCH TO THE SKIN DAILY AS DIRECTED BY PROVIDER, Disp: 28 patch, Rfl: 0    omeprazole (priLOSEC) 20 MG capsule, TAKE 1 CAPSULE BY MOUTH DAILY, Disp: 30 capsule, Rfl: 5    ondansetron ODT (ZOFRAN-ODT) 4 MG disintegrating tablet, Place 1 tablet on the tongue Every 6 (Six) Hours As Needed for Nausea or Vomiting., Disp: 30 tablet, Rfl: 1    potassium chloride ER (K-TAB) 20 MEQ tablet controlled-release ER tablet, Take 1 tablet by mouth Daily., Disp: 90 tablet, Rfl: 1    promethazine-dextromethorphan (PROMETHAZINE-DM) 6.25-15 MG/5ML syrup, TAKE 5 MILLILITERS BY MOUTH 4 TIMES A DAY AS NEEDED FOR COUGH, Disp: 240 mL, Rfl: 0    spironolactone (Aldactone) 25 MG tablet, Take 1 tablet by mouth Daily., Disp: 90 tablet, Rfl: 1    traMADol (ULTRAM) 50 MG tablet, Take 1 tablet by mouth Every 6 (Six) Hours As Needed for Moderate Pain., Disp: 28 tablet, Rfl: 0    Assessment & Plan  1. Hearing impairment.  Her auditory canals are significantly inflamed, obstructing the view of the tympanic membrane. The left ear exhibits a pronounced erythema and signs of infection. An ear irrigation procedure will be performed to alleviate the inflammation and improve auditory function.    2. Abdominal distension.  She presents with abdominal distension, which could potentially be attributed to fluid accumulation. Given her history of heart failure, this is a cause for concern as excessive abdominal fluid can adversely impact cardiac and pulmonary function. A CT scan of the abdomen will be ordered to further investigate the cause of the distension. Additionally, blood work will be conducted today for a comprehensive health assessment.    3. Lower extremity pain.  She reports intermittent leg pain, which is not severe. She experiences pain during ambulation, necessitating frequent rest periods. She also reports spontaneous bruising on her legs. An arterial Doppler study will be ordered to evaluate the arterial circulation in  her lower extremities.      PROCEDURE  The patient underwent a paracentesis procedure in 10/2023 at [facility].         Plan of care reviewed with the patient at the conclusion of today's visit.  Education was provided regarding diagnosis, management, and any prescribed or recommended OTC medications.  Patient verbalized understanding of and agreement with management plan.     Return in about 1 month (around 1/17/2025), or if symptoms worsen or fail to improve.      Transcribed from ambient dictation for MARTIN Ramirez by MARTIN Ramirez.  12/22/24   21:39 EST    Patient or patient representative verbalized consent for the use of Ambient Listening during the visit with  MARTIN Ramirez for chart documentation. 12/22/2024  21:45 EST

## 2025-01-08 DIAGNOSIS — N28.9 FUNCTION KIDNEY DECREASED: Primary | ICD-10-CM

## 2025-01-13 ENCOUNTER — TRANSCRIBE ORDERS (OUTPATIENT)
Dept: INTERNAL MEDICINE | Facility: CLINIC | Age: 60
End: 2025-01-13
Payer: MEDICAID

## 2025-01-13 DIAGNOSIS — Z12.31 ENCOUNTER FOR SCREENING MAMMOGRAM FOR BREAST CANCER: Primary | ICD-10-CM

## 2025-01-21 ENCOUNTER — HOSPITAL ENCOUNTER (OUTPATIENT)
Dept: CARDIOLOGY | Facility: HOSPITAL | Age: 60
Discharge: HOME | End: 2025-01-21
Admitting: NURSE PRACTITIONER
Payer: MEDICAID

## 2025-01-21 ENCOUNTER — TELEPHONE (OUTPATIENT)
Dept: INTERNAL MEDICINE | Facility: CLINIC | Age: 60
End: 2025-01-21
Payer: MEDICAID

## 2025-01-21 VITALS — BODY MASS INDEX: 27.42 KG/M2 | WEIGHT: 149 LBS | HEIGHT: 62 IN

## 2025-01-21 DIAGNOSIS — M79.89 LEG SWELLING: ICD-10-CM

## 2025-01-21 DIAGNOSIS — M79.604 PAIN IN BOTH LOWER EXTREMITIES: ICD-10-CM

## 2025-01-21 DIAGNOSIS — M79.605 PAIN IN BOTH LOWER EXTREMITIES: ICD-10-CM

## 2025-01-21 DIAGNOSIS — R52 PAIN AGGRAVATED BY WALKING: ICD-10-CM

## 2025-01-21 DIAGNOSIS — L81.9 DISCOLORATION OF SKIN: ICD-10-CM

## 2025-01-21 DIAGNOSIS — I70.0 ATHEROSCLEROSIS OF AORTA: ICD-10-CM

## 2025-01-21 LAB
BH CV LOWER ARTERIAL LEFT ABI RATIO: 1.2
BH CV LOWER ARTERIAL LEFT CALF RATIO: 1.2
BH CV LOWER ARTERIAL LEFT DORSALIS PEDIS SYS MAX: 183
BH CV LOWER ARTERIAL LEFT GREAT TOE SYS MAX: 116
BH CV LOWER ARTERIAL LEFT LOW THIGH RATIO: 1.26
BH CV LOWER ARTERIAL LEFT LOW THIGH SYS MAX: 181
BH CV LOWER ARTERIAL LEFT POPLITEAL SYS MAX: 178
BH CV LOWER ARTERIAL LEFT POST TIBIAL SYS MAX: 171
BH CV LOWER ARTERIAL LEFT TBI RATIO: 0.81
BH CV LOWER ARTERIAL RIGHT ABI RATIO: 1.2
BH CV LOWER ARTERIAL RIGHT CALF RATIO: 1.2
BH CV LOWER ARTERIAL RIGHT DORSALIS PEDIS SYS MAX: 244
BH CV LOWER ARTERIAL RIGHT GREAT TOE SYS MAX: 122
BH CV LOWER ARTERIAL RIGHT LOW THIGH RATIO: 1.3
BH CV LOWER ARTERIAL RIGHT LOW THIGH SYS MAX: 190
BH CV LOWER ARTERIAL RIGHT POPLITEAL SYS MAX: 174
BH CV LOWER ARTERIAL RIGHT POST TIBIAL SYS MAX: 172
BH CV LOWER ARTERIAL RIGHT TBI RATIO: 0.85
UPPER ARTERIAL LEFT ARM BRACHIAL SYS MAX: 144
UPPER ARTERIAL RIGHT ARM BRACHIAL SYS MAX: 144

## 2025-01-21 PROCEDURE — 93923 UPR/LXTR ART STDY 3+ LVLS: CPT

## 2025-01-21 NOTE — TELEPHONE ENCOUNTER
Caller: Jamaica Shell    Relationship: Self    Best call back number: [phone]    What specialty or service is being requested: CT ABDOMEN AND PELVIS ORAL     What is the provider, practice or medical service name: [facility]     Any additional details: PATIENT WAS NOT ABLE TO GO TO HER APPOINTMENT AND SHE NEEDS ANOTHER ORDER PUT IN TO BE SCHEDULED.     PATIENT MAY GO TO [facility] EMERGENCY ROOM IF HER SYMPTOMS GETS WORSE.      PLEASE CALL AND UPDATE ONCE COMPLETED

## 2025-01-22 ENCOUNTER — APPOINTMENT (OUTPATIENT)
Dept: GENERAL RADIOLOGY | Facility: HOSPITAL | Age: 60
End: 2025-01-22
Payer: MEDICAID

## 2025-01-22 ENCOUNTER — HOSPITAL ENCOUNTER (EMERGENCY)
Facility: HOSPITAL | Age: 60
Discharge: HOME | End: 2025-01-22
Attending: EMERGENCY MEDICINE
Payer: MEDICAID

## 2025-01-22 VITALS
HEART RATE: 71 BPM | BODY MASS INDEX: 24.8 KG/M2 | WEIGHT: 140 LBS | HEIGHT: 63 IN | DIASTOLIC BLOOD PRESSURE: 83 MMHG | SYSTOLIC BLOOD PRESSURE: 132 MMHG | RESPIRATION RATE: 16 BRPM | OXYGEN SATURATION: 97 % | TEMPERATURE: 97.8 F

## 2025-01-22 DIAGNOSIS — I50.33 ACUTE ON CHRONIC DIASTOLIC HEART FAILURE: Primary | ICD-10-CM

## 2025-01-22 LAB
ALBUMIN SERPL-MCNC: 3.8 G/DL (ref 3.5–5.2)
ALBUMIN/GLOB SERPL: 1.5 G/DL
ALP SERPL-CCNC: 103 U/L (ref 39–117)
ALT SERPL W P-5'-P-CCNC: 14 U/L (ref 1–33)
ANION GAP SERPL CALCULATED.3IONS-SCNC: 11 MMOL/L (ref 5–15)
AST SERPL-CCNC: 13 U/L (ref 1–32)
BASOPHILS # BLD AUTO: 0.02 10*3/MM3 (ref 0–0.2)
BASOPHILS NFR BLD AUTO: 0.4 % (ref 0–1.5)
BILIRUB SERPL-MCNC: <0.2 MG/DL (ref 0–1.2)
BUN SERPL-MCNC: 18 MG/DL (ref 6–20)
BUN/CREAT SERPL: 15.9 (ref 7–25)
CALCIUM SPEC-SCNC: 9.7 MG/DL (ref 8.6–10.5)
CHLORIDE SERPL-SCNC: 107 MMOL/L (ref 98–107)
CO2 SERPL-SCNC: 23 MMOL/L (ref 22–29)
CREAT SERPL-MCNC: 1.13 MG/DL (ref 0.57–1)
DEPRECATED RDW RBC AUTO: 43.7 FL (ref 37–54)
EGFRCR SERPLBLD CKD-EPI 2021: 56.9 ML/MIN/1.73
EOSINOPHIL # BLD AUTO: 0.06 10*3/MM3 (ref 0–0.4)
EOSINOPHIL NFR BLD AUTO: 1.1 % (ref 0.3–6.2)
ERYTHROCYTE [DISTWIDTH] IN BLOOD BY AUTOMATED COUNT: 15.7 % (ref 12.3–15.4)
FLUAV RNA RESP QL NAA+PROBE: NOT DETECTED
FLUBV RNA RESP QL NAA+PROBE: NOT DETECTED
GEN 5 1HR TROPONIN T REFLEX: 31 NG/L
GLOBULIN UR ELPH-MCNC: 2.6 GM/DL
GLUCOSE SERPL-MCNC: 122 MG/DL (ref 65–99)
HCT VFR BLD AUTO: 38.1 % (ref 34–46.6)
HGB BLD-MCNC: 12 G/DL (ref 12–15.9)
HOLD SPECIMEN: NORMAL
IMM GRANULOCYTES # BLD AUTO: 0.02 10*3/MM3 (ref 0–0.05)
IMM GRANULOCYTES NFR BLD AUTO: 0.4 % (ref 0–0.5)
LYMPHOCYTES # BLD AUTO: 1.57 10*3/MM3 (ref 0.7–3.1)
LYMPHOCYTES NFR BLD AUTO: 28.2 % (ref 19.6–45.3)
MCH RBC QN AUTO: 24.6 PG (ref 26.6–33)
MCHC RBC AUTO-ENTMCNC: 31.5 G/DL (ref 31.5–35.7)
MCV RBC AUTO: 78.2 FL (ref 79–97)
MONOCYTES # BLD AUTO: 0.37 10*3/MM3 (ref 0.1–0.9)
MONOCYTES NFR BLD AUTO: 6.7 % (ref 5–12)
NEUTROPHILS NFR BLD AUTO: 3.52 10*3/MM3 (ref 1.7–7)
NEUTROPHILS NFR BLD AUTO: 63.2 % (ref 42.7–76)
NRBC BLD AUTO-RTO: 0 /100 WBC (ref 0–0.2)
NT-PROBNP SERPL-MCNC: 107.7 PG/ML (ref 0–900)
PLATELET # BLD AUTO: 321 10*3/MM3 (ref 140–450)
PMV BLD AUTO: 9.1 FL (ref 6–12)
POTASSIUM SERPL-SCNC: 4.3 MMOL/L (ref 3.5–5.2)
PROT SERPL-MCNC: 6.4 G/DL (ref 6–8.5)
QT INTERVAL: 382 MS
QTC INTERVAL: 438 MS
RBC # BLD AUTO: 4.87 10*6/MM3 (ref 3.77–5.28)
SARS-COV-2 RNA RESP QL NAA+PROBE: NOT DETECTED
SODIUM SERPL-SCNC: 141 MMOL/L (ref 136–145)
TROPONIN T % DELTA: 7
TROPONIN T NUMERIC DELTA: 2 NG/L
TROPONIN T SERPL HS-MCNC: 29 NG/L
WBC NRBC COR # BLD AUTO: 5.56 10*3/MM3 (ref 3.4–10.8)
WHOLE BLOOD HOLD COAG: NORMAL
WHOLE BLOOD HOLD SPECIMEN: NORMAL

## 2025-01-22 PROCEDURE — 96374 THER/PROPH/DIAG INJ IV PUSH: CPT

## 2025-01-22 PROCEDURE — 87636 SARSCOV2 & INF A&B AMP PRB: CPT | Performed by: EMERGENCY MEDICINE

## 2025-01-22 PROCEDURE — 84484 ASSAY OF TROPONIN QUANT: CPT | Performed by: EMERGENCY MEDICINE

## 2025-01-22 PROCEDURE — 99284 EMERGENCY DEPT VISIT MOD MDM: CPT

## 2025-01-22 PROCEDURE — 83880 ASSAY OF NATRIURETIC PEPTIDE: CPT | Performed by: EMERGENCY MEDICINE

## 2025-01-22 PROCEDURE — 25010000002 FUROSEMIDE PER 20 MG: Performed by: EMERGENCY MEDICINE

## 2025-01-22 PROCEDURE — 73502 X-RAY EXAM HIP UNI 2-3 VIEWS: CPT

## 2025-01-22 PROCEDURE — 80053 COMPREHEN METABOLIC PANEL: CPT | Performed by: EMERGENCY MEDICINE

## 2025-01-22 PROCEDURE — 71045 X-RAY EXAM CHEST 1 VIEW: CPT

## 2025-01-22 PROCEDURE — 93005 ELECTROCARDIOGRAM TRACING: CPT | Performed by: EMERGENCY MEDICINE

## 2025-01-22 PROCEDURE — 93005 ELECTROCARDIOGRAM TRACING: CPT

## 2025-01-22 PROCEDURE — 36415 COLL VENOUS BLD VENIPUNCTURE: CPT

## 2025-01-22 PROCEDURE — 85025 COMPLETE CBC W/AUTO DIFF WBC: CPT

## 2025-01-22 RX ORDER — FUROSEMIDE 10 MG/ML
40 INJECTION INTRAMUSCULAR; INTRAVENOUS ONCE
Status: COMPLETED | OUTPATIENT
Start: 2025-01-22 | End: 2025-01-22

## 2025-01-22 RX ORDER — SODIUM CHLORIDE 0.9 % (FLUSH) 0.9 %
10 SYRINGE (ML) INJECTION AS NEEDED
Status: DISCONTINUED | OUTPATIENT
Start: 2025-01-22 | End: 2025-01-22 | Stop reason: HOSPADM

## 2025-01-22 RX ADMIN — FUROSEMIDE 40 MG: 10 INJECTION, SOLUTION INTRAMUSCULAR; INTRAVENOUS at 12:47

## 2025-01-22 NOTE — ED PROVIDER NOTES
Subjective   History of Present Illness  Mrs. Shell presents with shortness of breath.  She reports swelling in her abdomen and neck.  She denies fevers or chills.  She denies cough.  She tells me her inhalers do not help any.  She has history of CHF and asthma.  She is a smoker.  She also complains of pain in her right buttock area with weightbearing.  She tells me these problems have been ongoing on chronic but have worsened in the last few days.  Review of her records indicates she had negative venous Dopplers a month ago.  She had negative arterial Dopplers yesterday.      Review of Systems    Past Medical History:   Diagnosis Date    Anxiety     Asthma     CHF (congestive heart failure)     Depression     Hyperlipidemia     Hypertension        No Known Allergies    Past Surgical History:   Procedure Laterality Date    TUBAL ABDOMINAL LIGATION         Family History   Problem Relation Age of Onset    Diabetes Mother     No Known Problems Father     No Known Problems Sister     No Known Problems Brother     Heart disease Maternal Grandmother     No Known Problems Maternal Grandfather        Social History     Socioeconomic History    Marital status: Significant Other   Tobacco Use    Smoking status: Every Day     Current packs/day: 0.75     Average packs/day: 0.8 packs/day for 20.0 years (15.0 ttl pk-yrs)     Types: Cigarettes     Passive exposure: Current    Smokeless tobacco: Never    Tobacco comments:     Pt states around 3./4 pack sometimes more due to not drinking alcohol anymore (cessation since 11/2023)   Vaping Use    Vaping status: Never Used   Substance and Sexual Activity    Alcohol use: Not Currently     Comment: reports on 7/21/2021- 1 pint of bourbon/mignon per day since 2011    Drug use: Yes     Types: Marijuana    Sexual activity: Defer           Objective   Physical Exam  Vitals and nursing note reviewed.   Constitutional:       General: She is not in acute distress.     Appearance: Normal  appearance.   HENT:      Head: Normocephalic and atraumatic.      Nose: Nose normal. No congestion or rhinorrhea.   Eyes:      General: No scleral icterus.     Conjunctiva/sclera: Conjunctivae normal.   Neck:      Comments: No JVD   Cardiovascular:      Rate and Rhythm: Normal rate and regular rhythm.      Heart sounds: No murmur heard.     No friction rub.   Pulmonary:      Effort: Pulmonary effort is normal.      Breath sounds: Normal breath sounds. No wheezing or rales.   Abdominal:      General: Bowel sounds are normal.      Palpations: Abdomen is soft.      Tenderness: There is no abdominal tenderness. There is no guarding or rebound.   Musculoskeletal:         General: No tenderness.      Cervical back: Normal range of motion and neck supple.      Right lower leg: No edema.      Left lower leg: No edema.   Skin:     General: Skin is warm and dry.      Coloration: Skin is not pale.      Findings: No erythema.   Neurological:      General: No focal deficit present.      Mental Status: She is alert and oriented to person, place, and time.      Motor: No weakness.      Coordination: Coordination normal.   Psychiatric:         Mood and Affect: Mood normal.         Behavior: Behavior normal.         Thought Content: Thought content normal.         Procedures           ED Course  ED Course as of 01/22/25 1507   Wed Jan 22, 2025   1349 Chest x-ray shows cardiomegaly, no overt pulmonary edema.  Labs unremarkable.  Awaiting second troponin. [DT]   1506 Has had good diuresis.  Tells me she feels much better.  Spoke with her and her  about findings.  Will refer her to the heart failure clinic [DT]      ED Course User Index  [DT] Chapin Raymundo MD                                                       Medical Decision Making  Ordered and interpreted multiple labs.  Diuresed her with IV Lasix over several hours in the emergency department.  Had reevaluation    Problems Addressed:  Acute on chronic diastolic heart  failure: complicated acute illness or injury    Amount and/or Complexity of Data Reviewed  External Data Reviewed: notes.  Labs: ordered. Decision-making details documented in ED Course.  Radiology: ordered. Decision-making details documented in ED Course.  ECG/medicine tests: ordered. Decision-making details documented in ED Course.    Risk  Prescription drug management.        Final diagnoses:   Acute on chronic diastolic heart failure       ED Disposition  ED Disposition       ED Disposition   Discharge    Condition   Stable    Comment   --               Candace Cam, APRN  [address] [facility]  [address]  [place] [id number]  [phone]          Emmanuel Keith III, MD  [address] [facility]  [address]  [place] [address]  [phone]          [facility] CARDIOLOGY  [address] [facility]  [address]  [place] [id number] [phone]             Medication List      No changes were made to your prescriptions during this visit.            Chapin Raymundo MD  01/22/25 1500       Chapin Raymundo MD  01/22/25 150

## 2025-01-22 NOTE — DISCHARGE INSTRUCTIONS
The nurse from the heart failure clinic will call you and arrange follow-up.  I would also like you to call Dr. Thornton and arrange follow-up.  Return if worsening of your breathing or any other concerns.

## 2025-01-22 NOTE — Clinical Note
[facility] EMERGENCY DEPARTMENT  [address]  [facility] [address]  Phone: [phone]    Ralph Alen accompanied Jamaica Shell to the emergency department on 1/22/2025. They may return to work on 01/23/2025.        Thank you for choosing [facility].    Chapin Raymundo MD

## 2025-01-23 ENCOUNTER — TELEPHONE (OUTPATIENT)
Dept: INTERNAL MEDICINE | Facility: CLINIC | Age: 60
End: 2025-01-23

## 2025-01-23 DIAGNOSIS — R10.2 PELVIC PAIN: ICD-10-CM

## 2025-01-23 DIAGNOSIS — R10.31 RIGHT LOWER QUADRANT ABDOMINAL PAIN: ICD-10-CM

## 2025-01-23 DIAGNOSIS — R19.00 ABDOMINAL SWELLING: ICD-10-CM

## 2025-01-23 DIAGNOSIS — F10.11 ALCOHOL ABUSE, IN REMISSION: ICD-10-CM

## 2025-01-23 DIAGNOSIS — I70.0 ATHEROSCLEROSIS OF AORTA: ICD-10-CM

## 2025-01-23 DIAGNOSIS — R14.0 ABDOMINAL DISTENSION: Primary | ICD-10-CM

## 2025-01-23 NOTE — TELEPHONE ENCOUNTER
Caller: Jamaica Shell    Relationship: Self    Best call back number: [phone]     Caller requesting test results: JAMAICA    What test was performed: XRAYS AND LAB WORK    When was the test performed: 1.22.25    Where was the test performed: [facility] EMERGENCY ROOM    Additional notes: PATIENT CALLED TO CHECK ON THE RESULTS OF THE TESTS RAN YESTERDAY AT THE EMERGENCY ROOM    PLEASE ADVISE

## 2025-01-24 ENCOUNTER — TELEPHONE (OUTPATIENT)
Dept: INTERNAL MEDICINE | Facility: CLINIC | Age: 60
End: 2025-01-24
Payer: MEDICAID

## 2025-01-24 NOTE — TELEPHONE ENCOUNTER
Patient returned call and advised she did not see the calls, either way same day appt she wouldve not been able to make it. Patient requesting appointments to be made in the morning please.     Please advise   [phone]

## 2025-01-24 NOTE — TELEPHONE ENCOUNTER
Hub can relay message to patient     Attempted to contact the pt to inform her she is scheduled for ct abdomen and pelvis today 1/24/25 arriving at 1:45pm to drink to contrast for a 2:45pm appt. [place] [address] and their office phone is [phone] in case there are any questions or concerns. Please dont eat 4 hrs prior to appt    Sent [employer] message with appt info and attempted to call the pt twice, ramón try again later

## 2025-01-29 ENCOUNTER — TELEPHONE (OUTPATIENT)
Dept: CASE MANAGEMENT | Facility: OTHER | Age: 60
End: 2025-01-29
Payer: MEDICAID

## 2025-01-29 ENCOUNTER — TELEPHONE (OUTPATIENT)
Dept: INTERNAL MEDICINE | Facility: CLINIC | Age: 60
End: 2025-01-29

## 2025-01-29 ENCOUNTER — TELEPHONE (OUTPATIENT)
Dept: INTERNAL MEDICINE | Facility: CLINIC | Age: 60
End: 2025-01-29
Payer: MEDICAID

## 2025-01-29 NOTE — TELEPHONE ENCOUNTER
Caller: Jamaica Shell    Relationship: Self    Best call back number: [phone]     Caller requesting test results: PATIENT    What test was performed: LAB WORK AND IMAGING    When was the test performed: 1.22.25    Where was the test performed:  ER    Additional notes: PATIENT STATES SHE WAS SEEN IN THE  ER ON 1.22.25 AND BOTH BLOOD WORK AND IMAGING WAS DONE.  SHE IS CALLING TO CHECK THE RESULTS OF BOTH.

## 2025-01-29 NOTE — TELEPHONE ENCOUNTER
Caller: Jamaica Shell    Relationship: Self    Best call back number: [phone]     What is the best time to reach you: ANYTIME, OKAY LEAVE MESSAGE    Who are you requesting to speak with (clinical staff, provider,  specific staff member): CLINICAL    Do you know the name of the person who called: PATIENT    What was the call regarding: PATIENT IS RETURNING A CALL REGARDING POPULATION HEALTH.    Is it okay if the provider responds through MyChart: NO

## 2025-01-30 ENCOUNTER — PATIENT OUTREACH (OUTPATIENT)
Dept: CASE MANAGEMENT | Facility: OTHER | Age: 60
End: 2025-01-30
Payer: MEDICAID

## 2025-01-30 DIAGNOSIS — I50.22 CHRONIC HFREF (HEART FAILURE WITH REDUCED EJECTION FRACTION): ICD-10-CM

## 2025-01-30 DIAGNOSIS — F32.A ANXIETY AND DEPRESSION: ICD-10-CM

## 2025-01-30 DIAGNOSIS — I10 ESSENTIAL HYPERTENSION: Primary | ICD-10-CM

## 2025-01-30 DIAGNOSIS — F41.9 ANXIETY AND DEPRESSION: ICD-10-CM

## 2025-01-30 NOTE — OUTREACH NOTE
AMBULATORY CASE MANAGEMENT NOTE    Names and Relationships of Patient/Support Persons: Contact: [place] Shell; Relationship: Self -     Patient Outreach    Returned patient's VM. Spoke with patient regarding recent ER visit. Patient reports she is feeling better. Discussed symptoms of heart failure and when to seek medical attention. Mailed CHF and CCM information to patient. Discussed CCM services, patient is agreeable to participate.     Patient reports difficulty with transportation, this is why she misses appointments. Provided patient with the number for [facility] member services to see if transportation is a benefit. She voiced understanding and will call them. She denied further questions/needs at this time.     Adult Patient Profile  Questions/Answers      Flowsheet Row Most Recent Value   How to be Addressed [place] (pronounced Shinita)   How Well Do You Speak English? very well   Source of Information patient   Patient Aware of Diagnosis yes   Admission in Past 90 Days none   Hearing Difficulty or Deaf no   Visual Difficulty or Blind yes   Difficulty Concentrating, Remembering or Making Decisions no   Communication Difficulty no   Eating/Swallowing Difficulty no   Walking or Climbing Stairs Difficulty yes   Dressing/Bathing Difficulty no   Difficulty Managing Errands Independently no   Equipment Currently Used at Home bp cuff, scales   Change in Functional Status Since Onset of Current Illness/Injury yes   Fall Risk Category High        SDOH updated and reviewed with the patient during this program:  Financial Resource Strain: High Risk (1/30/2025)    Overall Financial Resource Strain (CARDIA)     Difficulty of Paying Living Expenses: Hard      --     Food Insecurity: No Food Insecurity (1/30/2025)    Hunger Vital Sign     Worried About Running Out of Food in the Last Year: Never true     Ran Out of Food in the Last Year: Never true      --      Received from [facility] [employer] [facility]    [place]  [place] [employer] - Occupational Stress Questionnaire      --     Transportation Needs: Unmet Transportation Needs (1/30/2025)    PRAPARE - Transportation     Lack of Transportation (Medical): Yes     Lack of Transportation (Non-Medical): Yes         1/30/2025, 14:23 SARAY LI  Ambulatory Case Management    1/30/2025, 14:04 EST

## 2025-01-30 NOTE — PLAN OF CARE
Problem: Heart Failure  Goal: Track and Manage Fluids and Swelling  Outcome: Progressing  Goal: Track and Manage Symptoms  Outcome: Progressing  Goal: Track and Manage Activity and Exertion  Outcome: Progressing  Goal: Optimal Care Coordination of a Patient Experiencing Heart Failure  Outcome: Progressing     Problem: Anxiety  Goal: Track My Symptoms  Outcome: Not Progressing  Goal: Manage My Symptoms  Outcome: Not Progressing  Goal: Define Levels of Anxiety  Outcome: Progressing  Goal: Optimal Care Coordination of a Patient Experiencing Anxiety  Outcome: Progressing

## 2025-01-30 NOTE — OUTREACH NOTE
[facility] End of Month Documentation     This Chronic Medical Management Care Plan for [place] Shell, 59 y.o. female, has been established; a new plan of care implemented and a new plan of care implemented for the month of January.  A cumulative time of 60 minutes was spent on this patient record this month, including chart review; phone call with patient.    Regarding the patient's problems: has Essential hypertension; Anxiety and depression; Mild intermittent asthma without complication; Irritable bowel syndrome with diarrhea; Pure hypercholesterolemia; Hypokalemia; PAT (paroxysmal atrial tachycardia); Vitamin D deficiency; Memory loss; Cerebral microvascular disease; Alcohol dependence, daily use; Other insomnia; Chronic HFrEF (heart failure with reduced ejection fraction); Atherosclerosis of aorta; Chronic left shoulder pain; Lumbar pain; Right hip pain; and Borderline diabetes on their problem list., the following items were addressed: medications; transitions to medical care and any changes can be found within the plan section of the note.  A detailed listing of time spent for chronic care management is tracked within each outreach encounter.  Current medications include:  has a current medication list which includes the following prescription(s): buspirone, fluoxetine, mirtazapine, oxybutynin xl, albuterol sulfate hfa, aspirin, atorvastatin, azithromycin, baclofen, bisoprolol, bupropion xl, chlorhexidine, denta 5000 plus, diclofenac sodium, empagliflozin, entresto, ferosul, folic acid, furosemide, loratadine, montelukast, multivitamin, nicotine, omeprazole, ondansetron odt, potassium chloride er, promethazine-dextromethorphan, spironolactone, and tramadol. and the patient is reported to be patient is compliant with medication protocol,  Medications are reported to be non-effective in controlling symptoms and changes have been made to the medication protocol.   All notes on chart for PCP to review.    The  patient was monitored remotely for weight; blood pressure.    The patient's physical needs include:  physical healthcare.     The patient's mental support needs include:  continued support    The patient's cognitive support needs include:  continued support    The patient's psychosocial support needs include:  continued support    The patient's functional needs include: physical healthcare    The patient's environmental needs include:  not applicable    Care Plan overall comments:  No data recorded    Refer to previous outreach notes for more information on the areas listed above.    Monthly Billing Diagnoses  (I10) Essential hypertension    (I50.22) Chronic HFrEF (heart failure with reduced ejection fraction)    (F41.9,  F32.A) Anxiety and depression    Medications   Medications have been reconciled    Care Plan progress this month:      Recently Modified Care Plans Updates made since 12/30/2024 12:00 AM      No recently modified care plans.             Anxiety       Track My Symptoms (Not Progressing)       Start:  01/30/25    Expected End:  02/05/26         My Anxiety Symptom Tracking To Do List       Start:  01/30/25         Why is this important?  Tracking symptoms can help manage anxiety.              Manage My Symptoms (Not Progressing)       Start:  01/30/25    Expected End:  02/05/26         My Anxiety Symptom Mangement To Do List       Start:  01/30/25         Why is this important?  Fear and feeling out of control can make you feel worse.  Self-calming is a skill you can learn to help manage your symptoms.  To calm yourself, listen to music, deep breathe, relax, get a massage, use aromatherapy or meditate.              Define Levels of Anxiety (Progressing)       Start:  01/30/25    Expected End:  02/05/26         My Anxiety Level To Do List       Start:  01/30/25         Why is this important?  You can make an action plan to manage your anxiety.              Optimal Care Coordination of a Patient  Experiencing Anxiety (Progressing)       Start:  01/30/25    Expected End:  02/05/26         Identify Anxiety Symptoms and Facilitate Treatment       Start:  01/30/25            Alleviate Barriers to Anxiety Management       Start:  01/30/25            Identify and Reduce Risks for Harm or Injury       Start:  01/30/25              Heart Failure       Track and Manage Fluids and Swelling (Progressing)       Start:  01/30/25    Expected End:  02/05/26         My Fluids and Swelling To Do List       Start:  01/30/25         Why is this important?  It is important to check your weight at home every day.  Also, check for swelling in your feet and legs every day.  Keeping your legs up while you are sitting and doing ankle pump exercises will help with the swelling.              Track and Manage Symptoms (Progressing)       Start:  01/30/25    Expected End:  02/05/26         My Heart Failure Symptoms To Do List       Start:  01/30/25         Why is this important?  You will be able to handle your symptoms better if you keep track of them.  Making some simple changes to your lifestyle will help.  Knowing how to self-manage shortness of breath and when to call the doctor is very important.  Eating healthy is one thing you can do to take care of yourself.              Track and Manage Activity and Exertion (Progressing)       Start:  01/30/25    Expected End:  02/05/26         My Activity and Exertion To Do List       Start:  01/30/25         Why is this important?  Exercising is very important when managing your heart failure.  It will help your heart get stronger.  Wearing a pedometer or using a fitness tracker can help you stay motivated.              Optimal Care Coordination of a Patient Experiencing Heart Failure (Progressing)       Start:  01/30/25    Expected End:  02/05/26         Identify and Minimize Risk of Heart Failure Exacerbation       Start:  01/30/25            Identify and Manage Comorbidities and  Complications       Start:  01/30/25            Alleviate Barriers to Optimal Nutrition and Fluid Status       Start:  01/30/25            Maintain Strength and Functional Ability       Start:  01/30/25            Monitor and Manage Sleep Disturbance       Start:  01/30/25            Support Psychosocial Response to Heart Failure       Start:  01/30/25            Develop and Maintain Palliative Care Plan       Start:  01/30/25                Current Specialty Plan of Care Status in process    Instructions   Patient was provided an electronic copy of care plan  CCM services were explained and offered and patient has accepted these services.  Patient has given their written consent to receive CCM services and understands that this includes the authorization of electronic communication of medical information with the other treating providers.  Patient understands that they may stop CCM services at any time and these changes will be effective at the end of the calendar month and will effectively revocate the agreement of CCM services.  Patient understands that only one practitioner can furnish and be paid for CCM services during one calendar month.  Patient also understands that there may be co-payment or deductible fees in association with CCM services.  Patient will continue with at least monthly follow-up calls with the Ambulatory .    Jannet LI  Ambulatory Case Management    1/30/2025, 14:36 EST

## 2025-01-31 NOTE — TELEPHONE ENCOUNTER
Please contact patient:    Did the ER go over her results with her? Her heart labs showed stress on the heart. She had an appt with cardiology and no showed. I suggest she see cardiology. Does she need a new referral or has she seen them before and can reschedule? Her hip xray showed severe arthritis. Did they have orthopedics do a consult or refer her somewhere. If she has not seen anyone for this then I can do a referral.

## 2025-02-03 DIAGNOSIS — M87.9 OSTEONECROSIS: Primary | ICD-10-CM

## 2025-02-03 DIAGNOSIS — M25.551 RIGHT HIP PAIN: ICD-10-CM

## 2025-02-03 NOTE — TELEPHONE ENCOUNTER
Ortho referral placed. Referral coordinator following up on cardiology referral placed by ER and will contact patient with appt.

## 2025-02-09 DIAGNOSIS — M54.12 CERVICAL RADICULOPATHY: ICD-10-CM

## 2025-02-09 DIAGNOSIS — M54.2 CERVICAL PAIN: ICD-10-CM

## 2025-02-10 RX ORDER — BACLOFEN 10 MG/1
10 TABLET ORAL 3 TIMES DAILY PRN
Qty: 90 TABLET | Refills: 2 | Status: SHIPPED | OUTPATIENT
Start: 2025-02-10

## 2025-02-12 DIAGNOSIS — I10 ESSENTIAL HYPERTENSION: ICD-10-CM

## 2025-02-13 RX ORDER — POTASSIUM CHLORIDE 1500 MG/1
20 TABLET, EXTENDED RELEASE ORAL DAILY
Qty: 90 TABLET | Refills: 3 | Status: SHIPPED | OUTPATIENT
Start: 2025-02-13

## 2025-02-24 ENCOUNTER — PATIENT OUTREACH (OUTPATIENT)
Dept: CASE MANAGEMENT | Facility: OTHER | Age: 60
End: 2025-02-24
Payer: MEDICAID

## 2025-02-24 DIAGNOSIS — I50.22 CHRONIC HFREF (HEART FAILURE WITH REDUCED EJECTION FRACTION): ICD-10-CM

## 2025-02-24 DIAGNOSIS — I10 ESSENTIAL HYPERTENSION: Primary | ICD-10-CM

## 2025-02-24 DIAGNOSIS — F41.9 ANXIETY AND DEPRESSION: ICD-10-CM

## 2025-02-24 DIAGNOSIS — F32.A ANXIETY AND DEPRESSION: ICD-10-CM

## 2025-02-24 NOTE — OUTREACH NOTE
AMBULATORY CASE MANAGEMENT NOTE    Names and Relationships of Patient/Support Persons: Contact: [place] Shell; Relationship: Self -     Care Coordination    Spoke with patient as a CCM follow up call. Patient reports abdominal and neck swelling. She stated she has transportation for morning appointments. Patient is agreeable for a follow up PCP appointment. Appointment arranged for tomorrow at 0800. Patient was appreciative of the help.     Patient called ACM back. Provided patient with the contact information for the social security administration and for the unemployment office.       Jannet LI  Ambulatory Case Management    2/24/2025, 14:37 EST

## 2025-02-25 RX ORDER — ASPIRIN 81 MG/1
81 TABLET, COATED ORAL DAILY
Qty: 90 TABLET | Refills: 3 | Status: SHIPPED | OUTPATIENT
Start: 2025-02-25

## 2025-02-28 ENCOUNTER — PATIENT OUTREACH (OUTPATIENT)
Dept: CASE MANAGEMENT | Facility: OTHER | Age: 60
End: 2025-02-28
Payer: MEDICAID

## 2025-02-28 DIAGNOSIS — I50.22 CHRONIC HFREF (HEART FAILURE WITH REDUCED EJECTION FRACTION): ICD-10-CM

## 2025-02-28 DIAGNOSIS — F32.A ANXIETY AND DEPRESSION: ICD-10-CM

## 2025-02-28 DIAGNOSIS — I10 ESSENTIAL HYPERTENSION: Primary | ICD-10-CM

## 2025-02-28 DIAGNOSIS — F41.9 ANXIETY AND DEPRESSION: ICD-10-CM

## 2025-02-28 NOTE — OUTREACH NOTE
[facility] End of Month Documentation    This Chronic Medical Management Care Plan for [place] Shell, 59 y.o. female, has been reviewed and a new plan of care implemented for the month of February.  A cumulative time of 23 minutes was spent on this patient record this month, including chart review; phone call with patient.    Regarding the patient's problems: has Essential hypertension; Anxiety and depression; Mild intermittent asthma without complication; Irritable bowel syndrome with diarrhea; Pure hypercholesterolemia; Hypokalemia; PAT (paroxysmal atrial tachycardia); Vitamin D deficiency; Memory loss; Cerebral microvascular disease; Alcohol dependence, daily use; Other insomnia; Chronic HFrEF (heart failure with reduced ejection fraction); Atherosclerosis of aorta; Chronic left shoulder pain; Lumbar pain; Right hip pain; and Borderline diabetes on their problem list., the following items were addressed: transitions to medical care and any changes can be found within the plan section of the note.  A detailed listing of time spent for chronic care management is tracked within each outreach encounter.  Current medications include:  has a current medication list which includes the following prescription(s): buspirone, fluoxetine, mirtazapine, oxybutynin xl, albuterol sulfate hfa, aspirin low dose, atorvastatin, azithromycin, baclofen, bisoprolol, bupropion xl, chlorhexidine, denta 5000 plus, diclofenac sodium, empagliflozin, entresto, ferosul, folic acid, furosemide, loratadine, montelukast, multivitamin, nicotine, omeprazole, ondansetron odt, potassium chloride er, promethazine-dextromethorphan, spironolactone, and tramadol. and the patient is reported to be No data recorded,  Medications are reported to be non-effective in controlling symptoms and changes have been made to the medication protocol.  Regarding these diagnoses, referrals were made to the following provider(s):  primary care provider.  All notes on  chart for PCP to review.    The patient was monitored remotely for weight; blood pressure.    The patient's physical needs include:  physical healthcare.     The patient's mental support needs include:  continued support    The patient's cognitive support needs include:  continued support    The patient's psychosocial support needs include:  continued support    The patient's functional needs include: physical healthcare    The patient's environmental needs include:  not applicable    Care Plan overall comments:  No data recorded    Refer to previous outreach notes for more information on the areas listed above.    Monthly Billing Diagnoses  (I10) Essential hypertension    (I50.22) Chronic HFrEF (heart failure with reduced ejection fraction)    (F41.9,  F32.A) Anxiety and depression    Medications   Medications have been reconciled    Care Plan progress this month:      Recently Modified Care Plans Updates made since 1/28/2025 12:00 AM      No recently modified care plans.             Anxiety       Track My Symptoms (Not Progressing)       Start:  01/30/25    Expected End:  02/05/26         My Anxiety Symptom Tracking To Do List       Start:  01/30/25         Why is this important?  Tracking symptoms can help manage anxiety.              Manage My Symptoms (Not Progressing)       Start:  01/30/25    Expected End:  02/05/26         My Anxiety Symptom Mangement To Do List       Start:  01/30/25         Why is this important?  Fear and feeling out of control can make you feel worse.  Self-calming is a skill you can learn to help manage your symptoms.  To calm yourself, listen to music, deep breathe, relax, get a massage, use aromatherapy or meditate.              Define Levels of Anxiety (Not Progressing)       Start:  01/30/25    Expected End:  02/05/26         My Anxiety Level To Do List       Start:  01/30/25         Why is this important?  You can make an action plan to manage your anxiety.              Optimal  Care Coordination of a Patient Experiencing Anxiety (Not Progressing)       Start:  01/30/25    Expected End:  02/05/26         Identify Anxiety Symptoms and Facilitate Treatment       Start:  01/30/25            Alleviate Barriers to Anxiety Management       Start:  01/30/25            Identify and Reduce Risks for Harm or Injury       Start:  01/30/25              Heart Failure       Track and Manage Fluids and Swelling (Not Progressing)       Start:  01/30/25    Expected End:  02/05/26         My Fluids and Swelling To Do List       Start:  01/30/25         Why is this important?  It is important to check your weight at home every day.  Also, check for swelling in your feet and legs every day.  Keeping your legs up while you are sitting and doing ankle pump exercises will help with the swelling.              Track and Manage Symptoms (Not Progressing)       Start:  01/30/25    Expected End:  02/05/26         My Heart Failure Symptoms To Do List       Start:  01/30/25         Why is this important?  You will be able to handle your symptoms better if you keep track of them.  Making some simple changes to your lifestyle will help.  Knowing how to self-manage shortness of breath and when to call the doctor is very important.  Eating healthy is one thing you can do to take care of yourself.              Track and Manage Activity and Exertion (Not Progressing)       Start:  01/30/25    Expected End:  02/05/26         My Activity and Exertion To Do List       Start:  01/30/25         Why is this important?  Exercising is very important when managing your heart failure.  It will help your heart get stronger.  Wearing a pedometer or using a fitness tracker can help you stay motivated.              Optimal Care Coordination of a Patient Experiencing Heart Failure (Not Progressing)       Start:  01/30/25    Expected End:  02/05/26         Identify and Minimize Risk of Heart Failure Exacerbation       Start:  01/30/25             Identify and Manage Comorbidities and Complications       Start:  01/30/25            Alleviate Barriers to Optimal Nutrition and Fluid Status       Start:  01/30/25            Maintain Strength and Functional Ability       Start:  01/30/25            Monitor and Manage Sleep Disturbance       Start:  01/30/25            Support Psychosocial Response to Heart Failure       Start:  01/30/25            Develop and Maintain Palliative Care Plan       Start:  01/30/25                Current Specialty Plan of Care Status signed by both patient and provider    Instructions   Patient was provided an electronic copy of care plan  CCM services were explained and offered and patient has accepted these services.  Patient has given their written consent to receive CCM services and understands that this includes the authorization of electronic communication of medical information with the other treating providers.  Patient understands that they may stop CCM services at any time and these changes will be effective at the end of the calendar month and will effectively revocate the agreement of CCM services.  Patient understands that only one practitioner can furnish and be paid for CCM services during one calendar month.  Patient also understands that there may be co-payment or deductible fees in association with CCM services.  Patient will continue with at least monthly follow-up calls with the Ambulatory .    Jannet LI  Ambulatory Case Management    2/28/2025, 16:33 EST

## 2025-02-28 NOTE — PLAN OF CARE
Problem: Heart Failure  Goal: Track and Manage Fluids and Swelling  Outcome: Not Progressing  Goal: Track and Manage Symptoms  Outcome: Not Progressing  Goal: Track and Manage Activity and Exertion  Outcome: Not Progressing  Goal: Optimal Care Coordination of a Patient Experiencing Heart Failure  Outcome: Not Progressing     Problem: Anxiety  Goal: Track My Symptoms  Outcome: Not Progressing  Goal: Manage My Symptoms  Outcome: Not Progressing  Goal: Define Levels of Anxiety  Outcome: Not Progressing  Goal: Optimal Care Coordination of a Patient Experiencing Anxiety  Outcome: Not Progressing

## 2025-03-21 ENCOUNTER — HOSPITAL ENCOUNTER (OUTPATIENT)
Dept: CT IMAGING | Facility: HOSPITAL | Age: 60
Discharge: HOME | End: 2025-03-21
Payer: MEDICAID

## 2025-03-21 DIAGNOSIS — R10.31 RIGHT LOWER QUADRANT ABDOMINAL PAIN: ICD-10-CM

## 2025-03-21 DIAGNOSIS — R19.00 ABDOMINAL SWELLING: ICD-10-CM

## 2025-03-21 DIAGNOSIS — R10.2 PELVIC PAIN: ICD-10-CM

## 2025-03-21 DIAGNOSIS — F10.11 ALCOHOL ABUSE, IN REMISSION: ICD-10-CM

## 2025-03-21 DIAGNOSIS — I70.0 ATHEROSCLEROSIS OF AORTA: ICD-10-CM

## 2025-03-21 DIAGNOSIS — R14.0 ABDOMINAL DISTENSION: ICD-10-CM

## 2025-03-21 PROCEDURE — 74176 CT ABD & PELVIS W/O CONTRAST: CPT

## 2025-03-21 RX ORDER — DIATRIZOATE MEGLUMINE AND DIATRIZOATE SODIUM 660; 100 MG/ML; MG/ML
15 SOLUTION ORAL; RECTAL ONCE
Status: DISCONTINUED | OUTPATIENT
Start: 2025-03-21 | End: 2025-03-22 | Stop reason: HOSPADM

## 2025-03-27 ENCOUNTER — OFFICE VISIT (OUTPATIENT)
Dept: INTERNAL MEDICINE | Facility: CLINIC | Age: 60
End: 2025-03-27
Payer: MEDICAID

## 2025-03-27 VITALS
DIASTOLIC BLOOD PRESSURE: 82 MMHG | HEIGHT: 63 IN | SYSTOLIC BLOOD PRESSURE: 138 MMHG | WEIGHT: 154.6 LBS | OXYGEN SATURATION: 98 % | TEMPERATURE: 97.1 F | HEART RATE: 76 BPM | BODY MASS INDEX: 27.39 KG/M2

## 2025-03-27 DIAGNOSIS — Z13.21 ENCOUNTER FOR VITAMIN DEFICIENCY SCREENING: ICD-10-CM

## 2025-03-27 DIAGNOSIS — M87.9 OSTEONECROSIS: ICD-10-CM

## 2025-03-27 DIAGNOSIS — Z78.0 ENCOUNTER FOR OSTEOPOROSIS SCREENING IN ASYMPTOMATIC POSTMENOPAUSAL PATIENT: ICD-10-CM

## 2025-03-27 DIAGNOSIS — M54.2 CERVICAL PAIN: ICD-10-CM

## 2025-03-27 DIAGNOSIS — R73.03 BORDERLINE DIABETES: ICD-10-CM

## 2025-03-27 DIAGNOSIS — Z13.820 ENCOUNTER FOR OSTEOPOROSIS SCREENING IN ASYMPTOMATIC POSTMENOPAUSAL PATIENT: ICD-10-CM

## 2025-03-27 DIAGNOSIS — M85.851 OSTEOPENIA OF RIGHT HIP: ICD-10-CM

## 2025-03-27 DIAGNOSIS — I10 ESSENTIAL HYPERTENSION: ICD-10-CM

## 2025-03-27 DIAGNOSIS — Z13.1 SCREENING FOR DIABETES MELLITUS: ICD-10-CM

## 2025-03-27 DIAGNOSIS — E55.9 VITAMIN D DEFICIENCY: ICD-10-CM

## 2025-03-27 DIAGNOSIS — Z13.0 SCREENING FOR BLOOD DISEASE: ICD-10-CM

## 2025-03-27 DIAGNOSIS — Z13.220 LIPID SCREENING: ICD-10-CM

## 2025-03-27 DIAGNOSIS — I50.32 CHRONIC DIASTOLIC (CONGESTIVE) HEART FAILURE: Primary | ICD-10-CM

## 2025-03-27 DIAGNOSIS — M54.12 CERVICAL RADICULOPATHY: ICD-10-CM

## 2025-03-27 DIAGNOSIS — R06.09 DYSPNEA ON EXERTION: ICD-10-CM

## 2025-03-27 DIAGNOSIS — R60.1 GENERALIZED EDEMA: ICD-10-CM

## 2025-03-27 DIAGNOSIS — Z13.29 THYROID DISORDER SCREENING: ICD-10-CM

## 2025-03-27 DIAGNOSIS — M25.551 RIGHT HIP PAIN: ICD-10-CM

## 2025-03-27 RX ORDER — CHOLECALCIFEROL (VITAMIN D3) 50 MCG
2000 TABLET ORAL DAILY
Qty: 90 TABLET | Refills: 1 | Status: SHIPPED | OUTPATIENT
Start: 2025-03-27

## 2025-03-27 RX ORDER — POTASSIUM CHLORIDE 1500 MG/1
20 TABLET, EXTENDED RELEASE ORAL DAILY
Qty: 90 TABLET | Refills: 3 | Status: SHIPPED | OUTPATIENT
Start: 2025-03-27

## 2025-03-27 RX ORDER — BUMETANIDE 2 MG/1
2 TABLET ORAL DAILY
Qty: 30 TABLET | Refills: 5 | Status: SHIPPED | OUTPATIENT
Start: 2025-03-27

## 2025-03-27 RX ORDER — SPIRONOLACTONE 25 MG/1
25 TABLET ORAL DAILY
Qty: 90 TABLET | Refills: 3
Start: 2025-03-27

## 2025-03-27 RX ORDER — PHENOL 1.4 %
600 AEROSOL, SPRAY (ML) MUCOUS MEMBRANE DAILY
Qty: 90 TABLET | Refills: 1 | Status: SHIPPED | OUTPATIENT
Start: 2025-03-27

## 2025-03-27 RX ORDER — TRAMADOL HYDROCHLORIDE 50 MG/1
50 TABLET ORAL EVERY 6 HOURS PRN
Qty: 28 TABLET | Refills: 2 | Status: SHIPPED | OUTPATIENT
Start: 2025-03-27

## 2025-03-27 NOTE — PROGRESS NOTES
Subjective   Chief Complaint   Patient presents with    cardiologist results         Jamaica Shell is a 59 y.o. female.    History of Present Illness  The patient presents for evaluation of right hip pain, osteopenia, and heart failure.    She reports experiencing severe pain in her right hip, which is exacerbated during ambulation. The pain is so intense that it necessitates a pause in her movement. She has not yet consulted with a specialist regarding this issue. Additionally, she experiences significant lower back pain, which she describes as excruciating. She finds relief from her pain symptoms with tramadol.    She has been experiencing edema, to the extent that she is unable to fit into her regular clothing and has resorted to wearing leggings. She also reports swelling in her thighs. She is currently on Lasix and spironolactone for fluid management. She has not been under the care of a cardiologist and has not had an echocardiogram since 2020. She reports fatigue even after short walks, such as from the gym to the kitchen sink, and prolonged conversations. She also reports increased urinary frequency when taking her diuretic medications.    She is not currently taking vitamin D supplements.    Supplemental Information  She is on medication for depression and anxiety. She is on medication for sleep.    MEDICATIONS  Current: tramadol, furosemide, spironolactone, potassium    I have reviewed the following portions of the patient's history and confirmed they are accurate: allergies, current medications, past family history, past medical history, past social history, past surgical history, and problem list    Review of Systems  Pertinent items are noted in HPI.     Current Outpatient Medications on File Prior to Visit   Medication Sig    busPIRone (BUSPAR) 5 MG tablet TAKE ONE TO TWO TABLETS BY MOUTH THREE TIMES A DAY AS NEEDED FOR ANXIETY    FLUoxetine (PROzac) 20 MG capsule TAKE THREE CAPSULES BY MOUTH  DAILY    mirtazapine (REMERON) 7.5 MG tablet TAKE ONE TABLET BY MOUTH ONCE NIGHTLY    oxybutynin XL (DITROPAN-XL) 10 MG 24 hr tablet TAKE 1 TABLET BY MOUTH DAILY    albuterol sulfate HFA (Ventolin HFA) 108 (90 Base) MCG/ACT inhaler INHALE TWO PUFFS BY MOUTH EVERY 4 TO 6 HOURS AS NEEDED FOR COUGH ,  SHORTNESS OF BREATH , OR WHEEZING    aspirin (Aspirin Low Dose) 81 MG EC tablet TAKE 1 TABLET BY MOUTH DAILY    atorvastatin (Lipitor) 80 MG tablet Take 1 tablet by mouth Daily.    azithromycin (ZITHROMAX) 250 MG tablet Take 2 tablets the first day, then 1 tablet daily for 4 days. (Patient not taking: Reported on 3/27/2025)    baclofen (LIORESAL) 10 MG tablet TAKE ONE TABLET BY MOUTH THREE TIMES A DAY AS NEEDED FOR MUSCLE SPASMS    bisoprolol (ZEBeta) 10 MG tablet Take 1 tablet by mouth Daily.    buPROPion XL (Wellbutrin XL) 300 MG 24 hr tablet Take 1 tablet by mouth Daily.    chlorhexidine (PERIDEX) 0.12 % solution     Denta 5000 Plus 1.1 % cream     Diclofenac Sodium (VOLTAREN) 1 % gel gel APPLY ONE GRAM TOPICALLY TO INDICATED AREA FOUR TIMES A DAY AS NEEDED FOR MILD TO MODERATE PAIN    empagliflozin (Jardiance) 10 MG tablet tablet Take 1 tablet by mouth Daily. Need f/u appt for further refills.    Entresto  MG tablet TAKE 1 TABLET BY MOUTH 2 TIMES A DAY *NEED FOLLOW-UP APPOINTMENT FOR FURTHER REFILLS*    FeroSul 325 (65 Fe) MG tablet TAKE 1 TABLET BY MOUTH DAILY WITH BREAKFAST    folic acid (FOLVITE) 1 MG tablet Take 1 tablet by mouth Daily.    loratadine (CLARITIN) 10 MG tablet Take 1 tablet by mouth Daily.    montelukast (SINGULAIR) 10 MG tablet TAKE ONE TABLET BY MOUTH ONCE NIGHTLY    multivitamin (Multiple Vitamin) tablet tablet Take 1 tablet by mouth Daily.    nicotine (NICODERM CQ) 21 MG/24HR patch APPLY 1 PATCH TO THE SKIN DAILY AS DIRECTED BY PROVIDER    omeprazole (priLOSEC) 20 MG capsule TAKE 1 CAPSULE BY MOUTH DAILY    ondansetron ODT (ZOFRAN-ODT) 4 MG disintegrating tablet Place 1 tablet on the tongue  "Every 6 (Six) Hours As Needed for Nausea or Vomiting.    promethazine-dextromethorphan (PROMETHAZINE-DM) 6.25-15 MG/5ML syrup TAKE 5 MILLILITERS BY MOUTH 4 TIMES A DAY AS NEEDED FOR COUGH     No current facility-administered medications on file prior to visit.       Objective   Vitals:    03/27/25 0821   BP: 138/82   BP Location: Left arm   Patient Position: Sitting   Cuff Size: Adult   Pulse: 76   Temp: 97.1 °F (36.2 °C)   SpO2: 98%   Weight: 70.1 kg (154 lb 9.6 oz)   Height: 160 cm (62.99\")     Body mass index is 27.39 kg/m².    Physical Exam  Vitals reviewed.   Constitutional:       Appearance: Normal appearance. She is well-developed.   HENT:      Head: Normocephalic and atraumatic.      Nose: Nose normal.   Eyes:      General: Lids are normal.      Conjunctiva/sclera: Conjunctivae normal.      Pupils: Pupils are equal, round, and reactive to light.   Neck:      Thyroid: No thyromegaly.      Trachea: Trachea normal.   Cardiovascular:      Rate and Rhythm: Normal rate and regular rhythm.      Heart sounds: Normal heart sounds.   Pulmonary:      Effort: Pulmonary effort is normal. No respiratory distress.      Breath sounds: Normal breath sounds.   Musculoskeletal:      Right hip: Tenderness present. Decreased range of motion.      Right upper leg: Tenderness present.   Skin:     General: Skin is warm and dry.   Neurological:      Mental Status: She is alert and oriented to person, place, and time.      GCS: GCS eye subscore is 4. GCS verbal subscore is 5. GCS motor subscore is 6.   Psychiatric:         Attention and Perception: Attention normal.         Mood and Affect: Mood normal.         Speech: Speech normal.         Behavior: Behavior normal. Behavior is cooperative.         Thought Content: Thought content normal.         Results  Laboratory Studies  Kidney function shows slight decrease.    Imaging  Mylio lipoma found on adrenal gland. Sigmoid diverticulosis observed. Severe arthritis in right hip " detected. Heart enlargement observed in x-ray.    Lab Results   Component Value Date    WBC 5.56 01/22/2025    HGB 12.0 01/22/2025    HCT 38.1 01/22/2025    MCV 78.2 (L) 01/22/2025     01/22/2025      Lab Results   Component Value Date    GLUCOSE 122 (H) 01/22/2025    BUN 18 01/22/2025    CREATININE 1.13 (H) 01/22/2025     01/22/2025    K 4.3 01/22/2025     01/22/2025    CALCIUM 9.7 01/22/2025    PROTEINTOT 6.4 01/22/2025    ALBUMIN 3.8 01/22/2025    ALT 14 01/22/2025    AST 13 01/22/2025    ALKPHOS 103 01/22/2025    BILITOT <0.2 01/22/2025    GLOB 2.6 01/22/2025    AGRATIO 1.5 01/22/2025    BCR 15.9 01/22/2025    ANIONGAP 11.0 01/22/2025    EGFR 56.9 (L) 01/22/2025      Lab Results   Component Value Date    HGBA1C 5.90 (H) 10/14/2024      Lab Results   Component Value Date    CHOL 146 05/06/2024    TRIG 122 05/06/2024    HDL 49 05/06/2024    LDL 75 05/06/2024      Lab Results   Component Value Date    TSH 0.959 05/06/2024          Assessment & Plan   Problem List Items Addressed This Visit       Essential hypertension    Relevant Medications    bumetanide (BUMEX) 2 MG tablet    spironolactone (Aldactone) 25 MG tablet    potassium chloride ER (K-TAB) 20 MEQ tablet controlled-release ER tablet    Vitamin D deficiency    Relevant Orders    Vitamin D,25-Hydroxy    Right hip pain    Relevant Medications    traMADol (ULTRAM) 50 MG tablet    Other Relevant Orders    Ambulatory Referral to Orthopedic Surgery (Completed)    Borderline diabetes    Relevant Orders    Hemoglobin A1c     Other Visit Diagnoses         Chronic diastolic (congestive) heart failure    -  Primary    Relevant Medications    bumetanide (BUMEX) 2 MG tablet    spironolactone (Aldactone) 25 MG tablet    potassium chloride ER (K-TAB) 20 MEQ tablet controlled-release ER tablet    Other Relevant Orders    proBNP    Ambulatory Referral to Cardiology (Completed)    Adult Transthoracic Echo Complete W/ Cont if Necessary Per Protocol       Osteonecrosis        Relevant Medications    traMADol (ULTRAM) 50 MG tablet    Other Relevant Orders    Ambulatory Referral to Orthopedic Surgery (Completed)      Generalized edema        Relevant Medications    bumetanide (BUMEX) 2 MG tablet    spironolactone (Aldactone) 25 MG tablet    potassium chloride ER (K-TAB) 20 MEQ tablet controlled-release ER tablet      Cervical pain          Cervical radiculopathy          Dyspnea on exertion        Relevant Orders    Ambulatory Referral to Cardiology (Completed)    Adult Transthoracic Echo Complete W/ Cont if Necessary Per Protocol      Screening for blood disease        Relevant Orders    CBC (No Diff)    Comprehensive Metabolic Panel    Lipid Panel    Hemoglobin A1c    TSH Rfx On Abnormal To Free T4    Vitamin B12 & Folate    Vitamin D,25-Hydroxy      Osteopenia of right hip        Relevant Medications    calcium carbonate (Calcium 600) 600 MG tablet    Cholecalciferol (Vitamin D) 50 MCG (2000 UT) tablet      Lipid screening        Relevant Orders    Lipid Panel      Screening for diabetes mellitus        Relevant Orders    Hemoglobin A1c      Thyroid disorder screening        Relevant Orders    TSH Rfx On Abnormal To Free T4      Encounter for vitamin deficiency screening        Relevant Orders    Vitamin B12 & Folate    Vitamin D,25-Hydroxy      Encounter for osteoporosis screening in asymptomatic postmenopausal patient        Relevant Orders    DEXA Bone Density Axial               Current Outpatient Medications:     busPIRone (BUSPAR) 5 MG tablet, TAKE ONE TO TWO TABLETS BY MOUTH THREE TIMES A DAY AS NEEDED FOR ANXIETY, Disp: 90 tablet, Rfl: 3    FLUoxetine (PROzac) 20 MG capsule, TAKE THREE CAPSULES BY MOUTH DAILY, Disp: 90 capsule, Rfl: 3    mirtazapine (REMERON) 7.5 MG tablet, TAKE ONE TABLET BY MOUTH ONCE NIGHTLY, Disp: 30 tablet, Rfl: 3    oxybutynin XL (DITROPAN-XL) 10 MG 24 hr tablet, TAKE 1 TABLET BY MOUTH DAILY, Disp: 30 tablet, Rfl: 3    potassium  chloride ER (K-TAB) 20 MEQ tablet controlled-release ER tablet, Take 1 tablet by mouth Daily., Disp: 90 tablet, Rfl: 3    spironolactone (Aldactone) 25 MG tablet, Take 1 tablet by mouth Daily., Disp: 90 tablet, Rfl: 3    traMADol (ULTRAM) 50 MG tablet, Take 1 tablet by mouth Every 6 (Six) Hours As Needed for Moderate Pain., Disp: 28 tablet, Rfl: 2    albuterol sulfate HFA (Ventolin HFA) 108 (90 Base) MCG/ACT inhaler, INHALE TWO PUFFS BY MOUTH EVERY 4 TO 6 HOURS AS NEEDED FOR COUGH ,  SHORTNESS OF BREATH , OR WHEEZING, Disp: 18 g, Rfl: 0    aspirin (Aspirin Low Dose) 81 MG EC tablet, TAKE 1 TABLET BY MOUTH DAILY, Disp: 90 tablet, Rfl: 3    atorvastatin (Lipitor) 80 MG tablet, Take 1 tablet by mouth Daily., Disp: 30 tablet, Rfl: 5    azithromycin (ZITHROMAX) 250 MG tablet, Take 2 tablets the first day, then 1 tablet daily for 4 days. (Patient not taking: Reported on 3/27/2025), Disp: 6 tablet, Rfl: 0    baclofen (LIORESAL) 10 MG tablet, TAKE ONE TABLET BY MOUTH THREE TIMES A DAY AS NEEDED FOR MUSCLE SPASMS, Disp: 90 tablet, Rfl: 2    bisoprolol (ZEBeta) 10 MG tablet, Take 1 tablet by mouth Daily., Disp: 90 tablet, Rfl: 3    bumetanide (BUMEX) 2 MG tablet, Take 1 tablet by mouth Daily. FLUID PILL, Disp: 30 tablet, Rfl: 5    buPROPion XL (Wellbutrin XL) 300 MG 24 hr tablet, Take 1 tablet by mouth Daily., Disp: 90 tablet, Rfl: 1    calcium carbonate (Calcium 600) 600 MG tablet, Take 1 tablet by mouth Daily., Disp: 90 tablet, Rfl: 1    chlorhexidine (PERIDEX) 0.12 % solution, , Disp: , Rfl:     Cholecalciferol (Vitamin D) 50 MCG (2000 UT) tablet, Take 1 tablet by mouth Daily., Disp: 90 tablet, Rfl: 1    Denta 5000 Plus 1.1 % cream, , Disp: , Rfl:     Diclofenac Sodium (VOLTAREN) 1 % gel gel, APPLY ONE GRAM TOPICALLY TO INDICATED AREA FOUR TIMES A DAY AS NEEDED FOR MILD TO MODERATE PAIN, Disp: 100 g, Rfl: 2    empagliflozin (Jardiance) 10 MG tablet tablet, Take 1 tablet by mouth Daily. Need f/u appt for further refills.,  Disp: 30 tablet, Rfl: 0    Entresto  MG tablet, TAKE 1 TABLET BY MOUTH 2 TIMES A DAY *NEED FOLLOW-UP APPOINTMENT FOR FURTHER REFILLS*, Disp: 60 tablet, Rfl: 2    FeroSul 325 (65 Fe) MG tablet, TAKE 1 TABLET BY MOUTH DAILY WITH BREAKFAST, Disp: 30 tablet, Rfl: 5    folic acid (FOLVITE) 1 MG tablet, Take 1 tablet by mouth Daily., Disp: 90 tablet, Rfl: 3    loratadine (CLARITIN) 10 MG tablet, Take 1 tablet by mouth Daily., Disp: 30 tablet, Rfl: 5    montelukast (SINGULAIR) 10 MG tablet, TAKE ONE TABLET BY MOUTH ONCE NIGHTLY, Disp: 90 tablet, Rfl: 1    multivitamin (Multiple Vitamin) tablet tablet, Take 1 tablet by mouth Daily., Disp: 30 tablet, Rfl: 5    nicotine (NICODERM CQ) 21 MG/24HR patch, APPLY 1 PATCH TO THE SKIN DAILY AS DIRECTED BY PROVIDER, Disp: 28 patch, Rfl: 0    omeprazole (priLOSEC) 20 MG capsule, TAKE 1 CAPSULE BY MOUTH DAILY, Disp: 30 capsule, Rfl: 5    ondansetron ODT (ZOFRAN-ODT) 4 MG disintegrating tablet, Place 1 tablet on the tongue Every 6 (Six) Hours As Needed for Nausea or Vomiting., Disp: 30 tablet, Rfl: 1    promethazine-dextromethorphan (PROMETHAZINE-DM) 6.25-15 MG/5ML syrup, TAKE 5 MILLILITERS BY MOUTH 4 TIMES A DAY AS NEEDED FOR COUGH, Disp: 240 mL, Rfl: 0    Assessment & Plan  1. Myelolipoma.  A myelolipoma was identified on the adrenal gland. Kidney function appears to be slightly compromised, potentially due to dehydration. A consultation with a urologist will be arranged to discuss the findings. An ultrasound will be scheduled in a year for further evaluation.    2. Sigmoid diverticulosis.  Sigmoid diverticulosis was noted, likely due to chronic constipation over time. She was advised to increase dietary fiber intake to prevent potential diverticulitis.    3. Right hip arthritis.  Severe arthritis was observed in the right hip, confirmed by an x-ray conducted in January 2025. The possibility of an injection for pain relief was discussed, as well as the eventual need for a hip  replacement. A referral to orthopedics will be made for further evaluation and management. If she experiences significant pain and difficulty walking, an injection may be considered. Xanax or Valium can be provided to manage anxiety related to the procedure.    4. Osteopenia.  Osteopenia, the early stage of osteoporosis, was diagnosed. A DEXA scan will be ordered to assess bone fragility. She will be prescribed calcium and vitamin D supplements to take daily.    5. Heart failure.  She has a history of heart failure and an enlarged heart was noted on an x-ray during a hospital visit in January 2025. A referral to cardiology will be made for further evaluation. An updated echocardiogram will be scheduled. Her EKG showed improvement from a previous heart block.    6. Medication management.  Her tramadol prescription will be refilled. She will be switched from Lasix to Bumex, which is more kidney-friendly and effective at reducing fluid retention. She will continue taking spironolactone and potassium 20 mEq. Labs will be conducted today to monitor kidney function and potassium levels, with a follow-up in one week.    Follow-up  The patient will follow up in 2 weeks.         Plan of care reviewed with the patient at the conclusion of today's visit.  Education was provided regarding diagnosis, management, and any prescribed or recommended OTC medications.  Patient verbalized understanding of and agreement with management plan.     Return in about 2 weeks (around 4/10/2025), or if symptoms worsen or fail to improve, for Follow-up.      Transcribed from ambient dictation for MARTIN Ramirez by MARTIN Ramirez.  03/27/25   09:02 EDT    Patient or patient representative verbalized consent for the use of Ambient Listening during the visit with  MARTIN Ramirez for chart documentation. 4/1/2025  16:45 EDT

## 2025-04-06 DIAGNOSIS — N39.46 MIXED STRESS AND URGE URINARY INCONTINENCE: ICD-10-CM

## 2025-04-06 DIAGNOSIS — F51.01 PRIMARY INSOMNIA: ICD-10-CM

## 2025-04-07 RX ORDER — OXYBUTYNIN CHLORIDE 10 MG/1
10 TABLET, EXTENDED RELEASE ORAL DAILY
Qty: 90 TABLET | Refills: 3 | Status: SHIPPED | OUTPATIENT
Start: 2025-04-07

## 2025-04-07 RX ORDER — MIRTAZAPINE 7.5 MG/1
7.5 TABLET, FILM COATED ORAL NIGHTLY
Qty: 90 TABLET | Refills: 3 | Status: SHIPPED | OUTPATIENT
Start: 2025-04-07

## 2025-04-08 ENCOUNTER — OFFICE VISIT (OUTPATIENT)
Dept: ORTHOPEDIC SURGERY | Facility: CLINIC | Age: 60
End: 2025-04-08
Payer: MEDICAID

## 2025-04-08 VITALS
DIASTOLIC BLOOD PRESSURE: 80 MMHG | WEIGHT: 155 LBS | SYSTOLIC BLOOD PRESSURE: 120 MMHG | HEIGHT: 63 IN | BODY MASS INDEX: 27.46 KG/M2

## 2025-04-08 DIAGNOSIS — M87.051 AVASCULAR NECROSIS OF BONE OF RIGHT HIP: Primary | ICD-10-CM

## 2025-04-08 DIAGNOSIS — Z72.0 TOBACCO ABUSE: ICD-10-CM

## 2025-04-08 PROCEDURE — 3079F DIAST BP 80-89 MM HG: CPT | Performed by: ORTHOPAEDIC SURGERY

## 2025-04-08 PROCEDURE — 3074F SYST BP LT 130 MM HG: CPT | Performed by: ORTHOPAEDIC SURGERY

## 2025-04-08 PROCEDURE — 1159F MED LIST DOCD IN RCRD: CPT | Performed by: ORTHOPAEDIC SURGERY

## 2025-04-08 PROCEDURE — 99244 OFF/OP CNSLTJ NEW/EST MOD 40: CPT | Performed by: ORTHOPAEDIC SURGERY

## 2025-04-08 PROCEDURE — 99406 BEHAV CHNG SMOKING 3-10 MIN: CPT | Performed by: ORTHOPAEDIC SURGERY

## 2025-04-08 PROCEDURE — 1160F RVW MEDS BY RX/DR IN RCRD: CPT | Performed by: ORTHOPAEDIC SURGERY

## 2025-04-08 NOTE — LETTER
April 8, 2025     MARTIN Murray  [address] [address]    Patient: Jamaica Shell   YOB: 1965   Date of Visit: 4/8/2025       Dear MARTIN Murray:    Thank you for referring Jamaica Shell to me for evaluation. Below are the relevant portions of my assessment and plan of care.    Encounter Diagnosis and Orders:  Diagnoses and all orders for this visit:    1. Avascular necrosis of bone of right hip (Primary)    2. Tobacco abuse        If you have questions, please do not hesitate to call me. I look forward to following Jamaica along with you.         Sincerely,        Arvin Cabrera MD        CC: No Recipients

## 2025-04-08 NOTE — PROGRESS NOTES
Orthopaedic Clinic Note: Hip New Patient    Chief Complaint   Patient presents with    Right Hip - Pain        HPI  Consult from: Candace Cheung*    Jamaicacoral Shell is a 59 y.o. female who presents with right hip pain for 1 year(s). Onset atraumatic and gradual in nature. Pain is localized to groin and is a 10/10 on the pain scale.Pain is described as aching. Associated symptoms include pain and giving way/buckling.  The pain is worse with walking; resting and pain medication and/or NSAID improve the pain. Previous treatments have included: nothing for 3 months duration or longer. Although some transient relief was reported with these interventions, these conservative measures have failed and symptoms have persisted. The patient is limited in daily activities and has had a significant decrease in quality of life as a result. She denies fevers, chills, or constitutional symptoms. She does smoke on a daily basis.     I have reviewed the following portions of the patient's history:History of Present Illness    Past Medical History:   Diagnosis Date    Anxiety     Asthma     CHF (congestive heart failure)     Depression     Hyperlipidemia     Hypertension       Past Surgical History:   Procedure Laterality Date    TUBAL ABDOMINAL LIGATION        Family History   Problem Relation Age of Onset    Diabetes Mother     No Known Problems Father     No Known Problems Sister     No Known Problems Brother     Heart disease Maternal Grandmother     No Known Problems Maternal Grandfather      Social History     Socioeconomic History    Marital status: Significant Other   Tobacco Use    Smoking status: Every Day     Current packs/day: 0.75     Average packs/day: 0.8 packs/day for 20.0 years (15.0 ttl pk-yrs)     Types: Cigarettes     Passive exposure: Current    Smokeless tobacco: Never    Tobacco comments:     Pt states around 3./4 pack sometimes more due to not drinking alcohol anymore (cessation since 11/2023)    Vaping Use    Vaping status: Never Used   Substance and Sexual Activity    Alcohol use: Not Currently     Comment: reports on 7/21/2021- 1 pint of bourbon/mignon per day since 2011    Drug use: Yes     Types: Marijuana    Sexual activity: Defer      Current Outpatient Medications on File Prior to Visit   Medication Sig Dispense Refill    busPIRone (BUSPAR) 5 MG tablet TAKE ONE TO TWO TABLETS BY MOUTH THREE TIMES A DAY AS NEEDED FOR ANXIETY 90 tablet 3    Calcium Carbonate 1500 (600 Ca) MG tablet       FLUoxetine (PROzac) 20 MG capsule TAKE THREE CAPSULES BY MOUTH DAILY 90 capsule 3    albuterol sulfate HFA (Ventolin HFA) 108 (90 Base) MCG/ACT inhaler INHALE TWO PUFFS BY MOUTH EVERY 4 TO 6 HOURS AS NEEDED FOR COUGH ,  SHORTNESS OF BREATH , OR WHEEZING 18 g 0    aspirin (Aspirin Low Dose) 81 MG EC tablet TAKE 1 TABLET BY MOUTH DAILY 90 tablet 3    atorvastatin (Lipitor) 80 MG tablet Take 1 tablet by mouth Daily. 30 tablet 5    azithromycin (ZITHROMAX) 250 MG tablet Take 2 tablets the first day, then 1 tablet daily for 4 days. (Patient not taking: Reported on 3/27/2025) 6 tablet 0    baclofen (LIORESAL) 10 MG tablet TAKE ONE TABLET BY MOUTH THREE TIMES A DAY AS NEEDED FOR MUSCLE SPASMS 90 tablet 2    bisoprolol (ZEBeta) 10 MG tablet Take 1 tablet by mouth Daily. 90 tablet 3    bumetanide (BUMEX) 2 MG tablet Take 1 tablet by mouth Daily. FLUID PILL 30 tablet 5    buPROPion XL (Wellbutrin XL) 300 MG 24 hr tablet Take 1 tablet by mouth Daily. 90 tablet 1    chlorhexidine (PERIDEX) 0.12 % solution       Cholecalciferol (Vitamin D) 50 MCG (2000 UT) tablet Take 1 tablet by mouth Daily. 90 tablet 1    Denta 5000 Plus 1.1 % cream       Diclofenac Sodium (VOLTAREN) 1 % gel gel APPLY ONE GRAM TOPICALLY TO INDICATED AREA FOUR TIMES A DAY AS NEEDED FOR MILD TO MODERATE PAIN 100 g 2    empagliflozin (Jardiance) 10 MG tablet tablet Take 1 tablet by mouth Daily. Need f/u appt for further refills. 30 tablet 0    Entresto  MG  tablet TAKE 1 TABLET BY MOUTH 2 TIMES A DAY *NEED FOLLOW-UP APPOINTMENT FOR FURTHER REFILLS* 60 tablet 2    FeroSul 325 (65 Fe) MG tablet TAKE 1 TABLET BY MOUTH DAILY WITH BREAKFAST 30 tablet 5    folic acid (FOLVITE) 1 MG tablet Take 1 tablet by mouth Daily. 90 tablet 3    loratadine (CLARITIN) 10 MG tablet Take 1 tablet by mouth Daily. 30 tablet 5    mirtazapine (REMERON) 7.5 MG tablet TAKE ONE TABLET BY MOUTH ONCE NIGHTLY 90 tablet 3    montelukast (SINGULAIR) 10 MG tablet TAKE ONE TABLET BY MOUTH ONCE NIGHTLY 90 tablet 1    multivitamin (Multiple Vitamin) tablet tablet Take 1 tablet by mouth Daily. 30 tablet 5    nicotine (NICODERM CQ) 21 MG/24HR patch APPLY 1 PATCH TO THE SKIN DAILY AS DIRECTED BY PROVIDER 28 patch 0    omeprazole (priLOSEC) 20 MG capsule TAKE 1 CAPSULE BY MOUTH DAILY 30 capsule 5    ondansetron ODT (ZOFRAN-ODT) 4 MG disintegrating tablet Place 1 tablet on the tongue Every 6 (Six) Hours As Needed for Nausea or Vomiting. 30 tablet 1    oxybutynin XL (DITROPAN-XL) 10 MG 24 hr tablet TAKE 1 TABLET BY MOUTH DAILY 90 tablet 3    potassium chloride ER (K-TAB) 20 MEQ tablet controlled-release ER tablet Take 1 tablet by mouth Daily. 90 tablet 3    promethazine-dextromethorphan (PROMETHAZINE-DM) 6.25-15 MG/5ML syrup TAKE 5 MILLILITERS BY MOUTH 4 TIMES A DAY AS NEEDED FOR COUGH 240 mL 0    spironolactone (Aldactone) 25 MG tablet Take 1 tablet by mouth Daily. 90 tablet 3    traMADol (ULTRAM) 50 MG tablet Take 1 tablet by mouth Every 6 (Six) Hours As Needed for Moderate Pain. 28 tablet 2    [DISCONTINUED] calcium carbonate (Calcium 600) 600 MG tablet Take 1 tablet by mouth Daily. 90 tablet 1     No current facility-administered medications on file prior to visit.      No Known Allergies     Review of Systems   Constitutional: Negative.    HENT: Negative.     Eyes: Negative.    Respiratory: Negative.     Cardiovascular: Negative.    Gastrointestinal: Negative.    Endocrine: Negative.    Genitourinary:  "Negative.    Musculoskeletal:  Positive for arthralgias.   Skin: Negative.    Allergic/Immunologic: Negative.    Neurological: Negative.    Hematological: Negative.    Psychiatric/Behavioral: Negative.          The patient's Review of Systems was personally reviewed and confirmed as accurate.    The following portions of the patient's history were reviewed and updated as appropriate: allergies, current medications, past family history, past medical history, past social history, past surgical history, and problem list.    Physical Exam  Blood pressure 120/80, height 160 cm (62.99\"), weight 70.3 kg (155 lb), not currently breastfeeding.    Body mass index is 27.46 kg/m².    GENERAL APPEARANCE: awake, alert & oriented x 3, in no acute distress and well developed, well nourished  PSYCH: normal affect  LUNGS:  breathing nonlabored  EYES: sclera anicteric  CARDIOVASCULAR: palpable dorsalis pedis, palpable posterior tibial bilaterally. Capillary refill less than 2 seconds  EXTREMITIES: no clubbing, cyanosis  GAIT:  Antalgic           Right Hip Exam:  RANGE OF MOTION:   FLEXION CONTRACTURE: 5 degree  FLEXION: 100 degrees  INTERNAL ROTATION: neutral degrees at 90 degrees of flexion   EXTERNAL ROTATION: 25 degrees at 90 degrees of flexion    PAIN WITH HIP MOTION: yes  PAIN WITH LOGROLL: no  STINCHFIELD TEST: positive    KNEE EXAM: full knee ROM (0-120 degrees), stable to varus and valgus stress at terminal extension and 30 degrees flexion     STRENGTH:  4/5 hip adduction, abduction, flexion. 5/5 knee flexion, extension. 5/5 ankle dorsiflexion and plantarflexion.     GREATER TROCHANTER BURSAL PAIN:  yes     REFLEXES:   PATELLAR 2+/4   ACHILLES 2+/4    CLONUS: no  STRAIGHT LEG TEST:   negative    SENSATION TO LIGHT TOUCH:  DEEP PERONEAL/SUPERFICIAL PERONEAL/SURAL/SAPHENOUS/TIBIAL:  intact    EDEMA:   no  ERYTHEMA:  no  WOUNDS/INCISIONS:  no      ------------------------------------------------------------------    LEG LENGTHS:  " equal  _____________________________________________________  _____________________________________________________    RADIOGRAPHIC FINDINGS:   AP pelvis and right hip radiographs from 1/22/2025 were personally reviewed and interpreted.  Imaging reveals avascular necrosis of the right femoral head with subchondral collapse and significant joint space narrowing.  Left hip demonstrates mild to moderate arthritic changes.    Assessment/Plan:   Diagnosis Plan   1. Avascular necrosis of bone of right hip        2. Tobacco abuse          Patient has avascular necrosis of the right hip with subchondral collapse.  Given the loss of femoral head sphericity, the patient would benefit from total hip arthroplasty.  I discussed the risk and benefits of surgical intervention.  I recommended surgical optimization in preparation for surgery.  Patient is currently a smoker which places her at increased risk of perioperative complications including infection.  After discussion of the risks and benefits of surgery, patient declined surgery at this time as she wishes to work on surgical optimization and nicotine cessation.  I explained she will need to be nicotine free for at least 6 weeks prior to surgery in order to optimize her immune system.  She is agreeable to this plan.  Will see her back in a few months to discuss surgical intervention provided she has ceased nicotine intake.    I spent approximately 3-10 minutes counseling the patient regarding the adverse effects of tobacco use.  Included in this counseling session were treatment options for cessation including quitting cold turkey, medication, nicotine step-down programs, and smoking cessation programs.  Furthermore, I explained to the patient the importance of abstaining from nicotine usage as nicotine is known to increase the risk of complications associated with surgery including but not limited to infection, decreased wound healing, slowed soft tissue healing, and  increased surgery associated pain.  As a result of this counseling session, the patient weighed the options and has decided to achieve tobacco cessation by 2 months via weaning/patch in preparation for surgery. This topic will be revisited upon return to clinic.    Arvin Cabrera MD  04/08/25  08:30 EDT

## 2025-04-10 ENCOUNTER — OFFICE VISIT (OUTPATIENT)
Dept: INTERNAL MEDICINE | Facility: CLINIC | Age: 60
End: 2025-04-10
Payer: MEDICAID

## 2025-04-10 VITALS
TEMPERATURE: 97 F | HEIGHT: 63 IN | BODY MASS INDEX: 26.82 KG/M2 | SYSTOLIC BLOOD PRESSURE: 116 MMHG | DIASTOLIC BLOOD PRESSURE: 78 MMHG | HEART RATE: 84 BPM | WEIGHT: 151.4 LBS | OXYGEN SATURATION: 96 %

## 2025-04-10 DIAGNOSIS — F17.210 CIGARETTE SMOKER: ICD-10-CM

## 2025-04-10 DIAGNOSIS — M25.551 RIGHT HIP PAIN: ICD-10-CM

## 2025-04-10 DIAGNOSIS — M87.9 OSTEONECROSIS: ICD-10-CM

## 2025-04-10 DIAGNOSIS — J40 BRONCHITIS: ICD-10-CM

## 2025-04-10 DIAGNOSIS — I50.22 CHRONIC HFREF (HEART FAILURE WITH REDUCED EJECTION FRACTION): Primary | ICD-10-CM

## 2025-04-10 RX ORDER — VARENICLINE TARTRATE 1 MG/1
1 TABLET, FILM COATED ORAL 2 TIMES DAILY
Qty: 56 TABLET | Refills: 4 | Status: SHIPPED | OUTPATIENT
Start: 2025-05-08 | End: 2025-09-25

## 2025-04-10 RX ORDER — VARENICLINE TARTRATE 0.5 (11)-1
KIT ORAL
Qty: 1 EACH | Refills: 0 | Status: SHIPPED | OUTPATIENT
Start: 2025-04-10 | End: 2025-05-08

## 2025-04-10 RX ORDER — DEXTROMETHORPHAN HYDROBROMIDE AND PROMETHAZINE HYDROCHLORIDE 15; 6.25 MG/5ML; MG/5ML
5 SYRUP ORAL 4 TIMES DAILY PRN
Qty: 240 ML | Refills: 0 | Status: SHIPPED | OUTPATIENT
Start: 2025-04-10

## 2025-04-10 NOTE — PROGRESS NOTES
Subjective   Chief Complaint   Patient presents with    Ortho discussion      Pt states they may want her to have a hip replacement         Jamaica Shell is a 59 y.o. female.    History of Present Illness  The patient presents for evaluation of a cough and smoking cessation.    She reports a persistent cough, which she describes as non-productive. She does not report any associated symptoms such as body aches or fever. She has been prescribed an effective cough medication in the past and is seeking a refill. Additionally, she mentions the need to use her inhaler but has been delaying its use.    She expresses fear about quitting smoking. She has successfully abstained from alcohol consumption and is confident in her ability to quit smoking as well. She enjoys cooking as a hobby.    She has received a call regarding her bone scan appointment and will return the call to schedule it.    She has not yet scheduled an appointment with a cardiologist.    Supplemental Information  She reports no leg swelling.    SOCIAL HISTORY  The patient admits to smoking and vaping. She has quit drinking.    FAMILY HISTORY  Her father had a rare lung disease and her mother  of COVID-19.    I have reviewed the following portions of the patient's history and confirmed they are accurate: allergies, current medications, past family history, past medical history, past social history, past surgical history, and problem list    Review of Systems  Pertinent items are noted in HPI.     Current Outpatient Medications on File Prior to Visit   Medication Sig    busPIRone (BUSPAR) 5 MG tablet TAKE ONE TO TWO TABLETS BY MOUTH THREE TIMES A DAY AS NEEDED FOR ANXIETY    FLUoxetine (PROzac) 20 MG capsule TAKE THREE CAPSULES BY MOUTH DAILY    albuterol sulfate HFA (Ventolin HFA) 108 (90 Base) MCG/ACT inhaler INHALE TWO PUFFS BY MOUTH EVERY 4 TO 6 HOURS AS NEEDED FOR COUGH ,  SHORTNESS OF BREATH , OR WHEEZING    aspirin (Aspirin Low Dose) 81 MG  EC tablet TAKE 1 TABLET BY MOUTH DAILY    atorvastatin (Lipitor) 80 MG tablet Take 1 tablet by mouth Daily.    azithromycin (ZITHROMAX) 250 MG tablet Take 2 tablets the first day, then 1 tablet daily for 4 days. (Patient not taking: Reported on 4/10/2025)    baclofen (LIORESAL) 10 MG tablet TAKE ONE TABLET BY MOUTH THREE TIMES A DAY AS NEEDED FOR MUSCLE SPASMS    bisoprolol (ZEBeta) 10 MG tablet Take 1 tablet by mouth Daily.    bumetanide (BUMEX) 2 MG tablet Take 1 tablet by mouth Daily. FLUID PILL    buPROPion XL (Wellbutrin XL) 300 MG 24 hr tablet Take 1 tablet by mouth Daily.    Calcium Carbonate 1500 (600 Ca) MG tablet     chlorhexidine (PERIDEX) 0.12 % solution     Cholecalciferol (Vitamin D) 50 MCG (2000 UT) tablet Take 1 tablet by mouth Daily.    Denta 5000 Plus 1.1 % cream     Diclofenac Sodium (VOLTAREN) 1 % gel gel APPLY ONE GRAM TOPICALLY TO INDICATED AREA FOUR TIMES A DAY AS NEEDED FOR MILD TO MODERATE PAIN    empagliflozin (Jardiance) 10 MG tablet tablet Take 1 tablet by mouth Daily. Need f/u appt for further refills.    Entresto  MG tablet TAKE 1 TABLET BY MOUTH 2 TIMES A DAY *NEED FOLLOW-UP APPOINTMENT FOR FURTHER REFILLS*    FeroSul 325 (65 Fe) MG tablet TAKE 1 TABLET BY MOUTH DAILY WITH BREAKFAST    folic acid (FOLVITE) 1 MG tablet Take 1 tablet by mouth Daily.    loratadine (CLARITIN) 10 MG tablet Take 1 tablet by mouth Daily.    mirtazapine (REMERON) 7.5 MG tablet TAKE ONE TABLET BY MOUTH ONCE NIGHTLY    montelukast (SINGULAIR) 10 MG tablet TAKE ONE TABLET BY MOUTH ONCE NIGHTLY    multivitamin (Multiple Vitamin) tablet tablet Take 1 tablet by mouth Daily.    nicotine (NICODERM CQ) 21 MG/24HR patch APPLY 1 PATCH TO THE SKIN DAILY AS DIRECTED BY PROVIDER    omeprazole (priLOSEC) 20 MG capsule TAKE 1 CAPSULE BY MOUTH DAILY    ondansetron ODT (ZOFRAN-ODT) 4 MG disintegrating tablet Place 1 tablet on the tongue Every 6 (Six) Hours As Needed for Nausea or Vomiting.    oxybutynin XL (DITROPAN-XL)  "10 MG 24 hr tablet TAKE 1 TABLET BY MOUTH DAILY    potassium chloride ER (K-TAB) 20 MEQ tablet controlled-release ER tablet Take 1 tablet by mouth Daily.    spironolactone (Aldactone) 25 MG tablet Take 1 tablet by mouth Daily.    traMADol (ULTRAM) 50 MG tablet Take 1 tablet by mouth Every 6 (Six) Hours As Needed for Moderate Pain.     No current facility-administered medications on file prior to visit.       Objective   Vitals:    04/10/25 0853   BP: 116/78   BP Location: Left arm   Patient Position: Sitting   Cuff Size: Adult   Pulse: 84   Temp: 97 °F (36.1 °C)   SpO2: 96%   Weight: 68.7 kg (151 lb 6.4 oz)   Height: 160 cm (62.99\")     Body mass index is 26.83 kg/m².    Physical Exam  Vitals reviewed.   Constitutional:       Appearance: Normal appearance. She is well-developed.   HENT:      Head: Normocephalic and atraumatic.      Nose: Nose normal.   Eyes:      General: Lids are normal.      Conjunctiva/sclera: Conjunctivae normal.      Pupils: Pupils are equal, round, and reactive to light.   Neck:      Thyroid: No thyromegaly.      Trachea: Trachea normal.   Cardiovascular:      Rate and Rhythm: Normal rate and regular rhythm.      Heart sounds: Normal heart sounds.   Pulmonary:      Effort: Pulmonary effort is normal. No respiratory distress.      Comments: Rhonchi with cough  Musculoskeletal:      Right hip: Tenderness present. Decreased range of motion.   Skin:     General: Skin is warm and dry.   Neurological:      Mental Status: She is alert and oriented to person, place, and time.      GCS: GCS eye subscore is 4. GCS verbal subscore is 5. GCS motor subscore is 6.   Psychiatric:         Attention and Perception: Attention normal.         Mood and Affect: Mood normal.         Speech: Speech normal.         Behavior: Behavior normal. Behavior is cooperative.         Thought Content: Thought content normal.         Results  Imaging  Adrenal gland mass identified as a fatty mass.    Lab Results   Component " Value Date    WBC 5.56 01/22/2025    HGB 12.0 01/22/2025    HCT 38.1 01/22/2025    MCV 78.2 (L) 01/22/2025     01/22/2025      Lab Results   Component Value Date    GLUCOSE 122 (H) 01/22/2025    BUN 18 01/22/2025    CREATININE 1.13 (H) 01/22/2025     01/22/2025    K 4.3 01/22/2025     01/22/2025    CALCIUM 9.7 01/22/2025    PROTEINTOT 6.4 01/22/2025    ALBUMIN 3.8 01/22/2025    ALT 14 01/22/2025    AST 13 01/22/2025    ALKPHOS 103 01/22/2025    BILITOT <0.2 01/22/2025    GLOB 2.6 01/22/2025    AGRATIO 1.5 01/22/2025    BCR 15.9 01/22/2025    ANIONGAP 11.0 01/22/2025    EGFR 56.9 (L) 01/22/2025      Lab Results   Component Value Date    HGBA1C 5.90 (H) 10/14/2024      Lab Results   Component Value Date    CHOL 146 05/06/2024    TRIG 122 05/06/2024    HDL 49 05/06/2024    LDL 75 05/06/2024      Lab Results   Component Value Date    TSH 0.959 05/06/2024          Assessment & Plan   Problem List Items Addressed This Visit       Chronic HFrEF (heart failure with reduced ejection fraction) - Primary    Right hip pain     Other Visit Diagnoses         Bronchitis        Relevant Medications    promethazine-dextromethorphan (PROMETHAZINE-DM) 6.25-15 MG/5ML syrup      Cigarette smoker        Relevant Medications    Varenicline Tartrate, Starter, 0.5 MG X 11 & 1 MG X 42 tablet therapy pack    varenicline (CHANTIX) 1 MG tablet (Start on 5/8/2025)      Osteonecrosis                   Current Outpatient Medications:     busPIRone (BUSPAR) 5 MG tablet, TAKE ONE TO TWO TABLETS BY MOUTH THREE TIMES A DAY AS NEEDED FOR ANXIETY, Disp: 90 tablet, Rfl: 3    FLUoxetine (PROzac) 20 MG capsule, TAKE THREE CAPSULES BY MOUTH DAILY, Disp: 90 capsule, Rfl: 3    albuterol sulfate HFA (Ventolin HFA) 108 (90 Base) MCG/ACT inhaler, INHALE TWO PUFFS BY MOUTH EVERY 4 TO 6 HOURS AS NEEDED FOR COUGH ,  SHORTNESS OF BREATH , OR WHEEZING, Disp: 18 g, Rfl: 0    aspirin (Aspirin Low Dose) 81 MG EC tablet, TAKE 1 TABLET BY MOUTH DAILY,  Disp: 90 tablet, Rfl: 3    atorvastatin (Lipitor) 80 MG tablet, Take 1 tablet by mouth Daily., Disp: 30 tablet, Rfl: 5    azithromycin (ZITHROMAX) 250 MG tablet, Take 2 tablets the first day, then 1 tablet daily for 4 days. (Patient not taking: Reported on 4/10/2025), Disp: 6 tablet, Rfl: 0    baclofen (LIORESAL) 10 MG tablet, TAKE ONE TABLET BY MOUTH THREE TIMES A DAY AS NEEDED FOR MUSCLE SPASMS, Disp: 90 tablet, Rfl: 2    bisoprolol (ZEBeta) 10 MG tablet, Take 1 tablet by mouth Daily., Disp: 90 tablet, Rfl: 3    bumetanide (BUMEX) 2 MG tablet, Take 1 tablet by mouth Daily. FLUID PILL, Disp: 30 tablet, Rfl: 5    buPROPion XL (Wellbutrin XL) 300 MG 24 hr tablet, Take 1 tablet by mouth Daily., Disp: 90 tablet, Rfl: 1    Calcium Carbonate 1500 (600 Ca) MG tablet, , Disp: , Rfl:     chlorhexidine (PERIDEX) 0.12 % solution, , Disp: , Rfl:     Cholecalciferol (Vitamin D) 50 MCG (2000 UT) tablet, Take 1 tablet by mouth Daily., Disp: 90 tablet, Rfl: 1    Denta 5000 Plus 1.1 % cream, , Disp: , Rfl:     Diclofenac Sodium (VOLTAREN) 1 % gel gel, APPLY ONE GRAM TOPICALLY TO INDICATED AREA FOUR TIMES A DAY AS NEEDED FOR MILD TO MODERATE PAIN, Disp: 100 g, Rfl: 2    empagliflozin (Jardiance) 10 MG tablet tablet, Take 1 tablet by mouth Daily. Need f/u appt for further refills., Disp: 30 tablet, Rfl: 0    Entresto  MG tablet, TAKE 1 TABLET BY MOUTH 2 TIMES A DAY *NEED FOLLOW-UP APPOINTMENT FOR FURTHER REFILLS*, Disp: 60 tablet, Rfl: 2    FeroSul 325 (65 Fe) MG tablet, TAKE 1 TABLET BY MOUTH DAILY WITH BREAKFAST, Disp: 30 tablet, Rfl: 5    folic acid (FOLVITE) 1 MG tablet, Take 1 tablet by mouth Daily., Disp: 90 tablet, Rfl: 3    loratadine (CLARITIN) 10 MG tablet, Take 1 tablet by mouth Daily., Disp: 30 tablet, Rfl: 5    mirtazapine (REMERON) 7.5 MG tablet, TAKE ONE TABLET BY MOUTH ONCE NIGHTLY, Disp: 90 tablet, Rfl: 3    montelukast (SINGULAIR) 10 MG tablet, TAKE ONE TABLET BY MOUTH ONCE NIGHTLY, Disp: 90 tablet, Rfl: 1     multivitamin (Multiple Vitamin) tablet tablet, Take 1 tablet by mouth Daily., Disp: 30 tablet, Rfl: 5    nicotine (NICODERM CQ) 21 MG/24HR patch, APPLY 1 PATCH TO THE SKIN DAILY AS DIRECTED BY PROVIDER, Disp: 28 patch, Rfl: 0    omeprazole (priLOSEC) 20 MG capsule, TAKE 1 CAPSULE BY MOUTH DAILY, Disp: 30 capsule, Rfl: 5    ondansetron ODT (ZOFRAN-ODT) 4 MG disintegrating tablet, Place 1 tablet on the tongue Every 6 (Six) Hours As Needed for Nausea or Vomiting., Disp: 30 tablet, Rfl: 1    oxybutynin XL (DITROPAN-XL) 10 MG 24 hr tablet, TAKE 1 TABLET BY MOUTH DAILY, Disp: 90 tablet, Rfl: 3    potassium chloride ER (K-TAB) 20 MEQ tablet controlled-release ER tablet, Take 1 tablet by mouth Daily., Disp: 90 tablet, Rfl: 3    promethazine-dextromethorphan (PROMETHAZINE-DM) 6.25-15 MG/5ML syrup, Take 5 mL by mouth 4 (Four) Times a Day As Needed for Cough., Disp: 240 mL, Rfl: 0    spironolactone (Aldactone) 25 MG tablet, Take 1 tablet by mouth Daily., Disp: 90 tablet, Rfl: 3    traMADol (ULTRAM) 50 MG tablet, Take 1 tablet by mouth Every 6 (Six) Hours As Needed for Moderate Pain., Disp: 28 tablet, Rfl: 2    [START ON 5/8/2025] varenicline (CHANTIX) 1 MG tablet, Take 1 tablet by mouth 2 (Two) Times a Day for 140 days., Disp: 56 tablet, Rfl: 4    Varenicline Tartrate, Starter, 0.5 MG X 11 & 1 MG X 42 tablet therapy pack, Take 0.5 mg by mouth Daily for 3 days, THEN 0.5 mg 2 (Two) Times a Day for 4 days, THEN 1 mg 2 (Two) Times a Day for 21 days. Take 0.5 mg po daily x 3 days, then 0.5 mg po bid x 4 days, then 1 mg po bid, Disp: 1 each, Rfl: 0    Assessment & Plan  1. Cough.  She reports a persistent cough without any expectoration. There are no signs of pneumonia or other serious conditions. She is advised to use her inhaler as needed. A prescription for cough medicine will be sent to [facility] at [facility]. If the cough worsens or does not improve, she should notify the clinic. If necessary, an antibiotic may be  considered.    2. Smoking cessation.  She is strongly advised to quit smoking due to the increased risk of complications, especially concerning her hip condition. Chantix has been prescribed to aid in smoking cessation. She will  the starter pack and continue with a 6-month regimen to prevent cravings. She is encouraged to find alternative coping mechanisms, such as cooking, to manage stress and occupy her time.    Discussed smoking cessation and all available options for quitting including nicotine patches, nicotine gum, nicotine inhaler, Chantix, and Wellbutrin. Discussed relaxation exercises and suggested behavioral therapy to increase rate of success. Provided resources including 1-800-QUIT-NOW. Quit date of one month on 5/10/25. Continued tobacco use can poorly impact patient's health including risk factors for cancer and COPD.     3. Adrenal gland mass.  The mass on her adrenal gland is identified as a benign fatty mass. No further follow-up is required for this condition.    4. Bone scan.  A bone scan has been ordered, and she will follow up on the appointment.    5. Cardiology follow-up.  She is advised to schedule an annual appointment with a cardiologist. The clinic will follow up to ensure this appointment is made.    Follow-up  The patient will follow up in 3 months or sooner if any problems arise.         I have spent 32 minutes with patient face to face with 8 minutes spent counseling on: smoking cessation and all available options for quitting including nicotine patches, nicotine gum, nicotine inhaler, and Wellbutrin. Discussed relaxation exercises and suggested behavioral therapy to increase rate of success. Provided resources including 1-800-QUIT-NOW. Plan to quit in one month on 5/10/25.    Plan of care reviewed with the patient at the conclusion of today's visit.  Education was provided regarding diagnosis, management, and any prescribed or recommended OTC medications.  Patient verbalized  understanding of and agreement with management plan.     Return in about 3 months (around 7/10/2025), or if symptoms worsen or fail to improve.      Transcribed from ambient dictation for MARTIN Ramirez by MARTIN Ramirez.  04/10/25   09:27 EDT    Patient or patient representative verbalized consent for the use of Ambient Listening during the visit with  MARTIN Ramirez for chart documentation. 4/17/2025  09:29 EDT

## 2025-05-05 ENCOUNTER — TELEPHONE (OUTPATIENT)
Dept: INTERNAL MEDICINE | Age: 60
End: 2025-05-05
Payer: MEDICAID

## 2025-05-05 NOTE — TELEPHONE ENCOUNTER
Caller: Jamaica Shell     Relationship: SELF    Best call back number: [phone]     What is your medical concern? SOCIAL SECURITY / DISABILITY OFFICE IS NEEDING THE DATES PATIENT WAS DIAGNOSED WITH ARTHRITIS AND NEEDS THE DATE SHE WAS RECOMMEND TO HAVE A HIP REPLACEMENT DUE HER RIGHT LEG.      PLEASE CALL AND ADVISE

## 2025-05-07 DIAGNOSIS — I10 ESSENTIAL HYPERTENSION: ICD-10-CM

## 2025-05-07 DIAGNOSIS — I50.22 CHRONIC HFREF (HEART FAILURE WITH REDUCED EJECTION FRACTION): ICD-10-CM

## 2025-05-07 RX ORDER — SACUBITRIL AND VALSARTAN 97; 103 MG/1; MG/1
1 TABLET, FILM COATED ORAL 2 TIMES DAILY
Qty: 60 TABLET | Refills: 1 | Status: SHIPPED | OUTPATIENT
Start: 2025-05-07

## 2025-05-07 RX ORDER — BUSPIRONE HYDROCHLORIDE 5 MG/1
TABLET ORAL
Qty: 90 TABLET | Refills: 3 | Status: SHIPPED | OUTPATIENT
Start: 2025-05-07

## 2025-05-12 NOTE — TELEPHONE ENCOUNTER
Caller: Jamaica Shell    Relationship: Self    Best call back number: [phone]    What is the best time to reach you: ANY TIME    Who are you requesting to speak with (clinical staff, provider,  specific staff member): SUSANA FIGUEROA    Do you know the name of the person who called: SELF    What was the call regarding: PATIENT NEEDS TO KNOW WHEN SHE WAS DIAGNOSED WITH CHF FOR DISABILITY. PLEASE ADVISE

## 2025-05-25 DIAGNOSIS — F41.9 ANXIETY AND DEPRESSION: ICD-10-CM

## 2025-05-25 DIAGNOSIS — F32.A ANXIETY AND DEPRESSION: ICD-10-CM

## 2025-05-27 RX ORDER — BUPROPION HYDROCHLORIDE 300 MG/1
300 TABLET ORAL DAILY
Qty: 30 TABLET | Refills: 3 | Status: SHIPPED | OUTPATIENT
Start: 2025-05-27

## 2025-06-12 ENCOUNTER — TELEPHONE (OUTPATIENT)
Dept: INTERNAL MEDICINE | Age: 60
End: 2025-06-12
Payer: MEDICAID

## 2025-06-12 NOTE — TELEPHONE ENCOUNTER
Caller: Jamaica Shell    Relationship: Self    Best call back number: [phone]    Which medication are you concerned about: CHOLECALCIFEROL 50MCG    Who prescribed you this medication: SUSANA FIGUEROA    What are your concerns: PATIENT WAS PRESCRIBED THIS BACK ON 03/27/2025 AND [facility] NEVER FILLED IT DUE TO NOT KNOWING WHICH VITAMIN D IT WAS. PATIENT HAS NEVER TAKEN THIS MEDICATION AND NEEDS NEW PRESCRIPTION SENT TO PHARMACY. PLEASE ADVISE

## 2025-06-13 RX ORDER — CHOLECALCIFEROL (VITAMIN D3) 50 MCG
2000 TABLET ORAL DAILY
Qty: 90 TABLET | Refills: 1 | Status: SHIPPED | OUTPATIENT
Start: 2025-06-13

## 2025-06-20 ENCOUNTER — HOSPITAL ENCOUNTER (OUTPATIENT)
Dept: CARDIOLOGY | Facility: HOSPITAL | Age: 60
Discharge: HOME | End: 2025-06-20
Payer: MEDICAID

## 2025-06-20 VITALS — HEIGHT: 63 IN | WEIGHT: 151 LBS | BODY MASS INDEX: 26.75 KG/M2

## 2025-06-20 DIAGNOSIS — R06.09 DYSPNEA ON EXERTION: ICD-10-CM

## 2025-06-20 DIAGNOSIS — I50.32 CHRONIC DIASTOLIC (CONGESTIVE) HEART FAILURE: ICD-10-CM

## 2025-06-20 LAB
AORTIC DIMENSIONLESS INDEX: 0.65 (DI)
ASCENDING AORTA: 3.6 CM
AV MEAN PRESS GRAD SYS DOP V1V2: 3.2 MMHG
AV VMAX SYS DOP: 123.4 CM/SEC
BH CV ECHO MEAS - AO MAX PG: 6.1 MMHG
BH CV ECHO MEAS - AO ROOT DIAM: 3.2 CM
BH CV ECHO MEAS - AO V2 VTI: 25.4 CM
BH CV ECHO MEAS - AVA(I,D): 2.46 CM2
BH CV ECHO MEAS - EDV(CUBED): 59.3 ML
BH CV ECHO MEAS - EDV(MOD-SP2): 75.9 ML
BH CV ECHO MEAS - EDV(MOD-SP4): 90.2 ML
BH CV ECHO MEAS - EF(MOD-SP2): 62.8 %
BH CV ECHO MEAS - EF(MOD-SP4): 71.2 %
BH CV ECHO MEAS - ESV(CUBED): 12.2 ML
BH CV ECHO MEAS - ESV(MOD-SP2): 28.2 ML
BH CV ECHO MEAS - ESV(MOD-SP4): 26 ML
BH CV ECHO MEAS - FS: 41 %
BH CV ECHO MEAS - IVS/LVPW: 1.25 CM
BH CV ECHO MEAS - IVSD: 1.5 CM
BH CV ECHO MEAS - LA DIMENSION: 3.9 CM
BH CV ECHO MEAS - LAT PEAK E' VEL: 11.4 CM/SEC
BH CV ECHO MEAS - LV DIASTOLIC VOL/BSA (35-75): 52.6 CM2
BH CV ECHO MEAS - LV MASS(C)D: 190.4 GRAMS
BH CV ECHO MEAS - LV MAX PG: 3.7 MMHG
BH CV ECHO MEAS - LV MEAN PG: 1.33 MMHG
BH CV ECHO MEAS - LV SYSTOLIC VOL/BSA (12-30): 15.2 CM2
BH CV ECHO MEAS - LV V1 MAX: 78.3 CM/SEC
BH CV ECHO MEAS - LV V1 VTI: 16.4 CM
BH CV ECHO MEAS - LVIDD: 3.9 CM
BH CV ECHO MEAS - LVIDS: 2.3 CM
BH CV ECHO MEAS - LVOT AREA: 3.8 CM2
BH CV ECHO MEAS - LVOT DIAM: 2.2 CM
BH CV ECHO MEAS - LVPWD: 1.2 CM
BH CV ECHO MEAS - MED PEAK E' VEL: 5.6 CM/SEC
BH CV ECHO MEAS - MV A MAX VEL: 57.3 CM/SEC
BH CV ECHO MEAS - MV DEC SLOPE: 172 CM/SEC2
BH CV ECHO MEAS - MV DEC TIME: 0.28 SEC
BH CV ECHO MEAS - MV E MAX VEL: 50.3 CM/SEC
BH CV ECHO MEAS - MV E/A: 0.88
BH CV ECHO MEAS - MV P1/2T: 94 MSEC
BH CV ECHO MEAS - MVA(P1/2T): 2.34 CM2
BH CV ECHO MEAS - PA ACC TIME: 0.15 SEC
BH CV ECHO MEAS - PA V2 MAX: 90.8 CM/SEC
BH CV ECHO MEAS - PAPD(PI EDV): 4 MMHG
BH CV ECHO MEAS - PI END-D VEL: 95.3 CM/SEC
BH CV ECHO MEAS - RAP SYSTOLE: 3 MMHG
BH CV ECHO MEAS - RVSP: 27 MMHG
BH CV ECHO MEAS - SV(LVOT): 62.5 ML
BH CV ECHO MEAS - SV(MOD-SP2): 47.7 ML
BH CV ECHO MEAS - SV(MOD-SP4): 64.2 ML
BH CV ECHO MEAS - SVI(LVOT): 36.4 ML/M2
BH CV ECHO MEAS - SVI(MOD-SP2): 27.8 ML/M2
BH CV ECHO MEAS - SVI(MOD-SP4): 37.4 ML/M2
BH CV ECHO MEAS - TAPSE (>1.6): 1.71 CM
BH CV ECHO MEAS - TR MAX PG: 23.6 MMHG
BH CV ECHO MEAS - TR MAX VEL: 243 CM/SEC
BH CV ECHO MEASUREMENTS AVERAGE E/E' RATIO: 5.92
BH CV XLRA - RV BASE: 3.9 CM
BH CV XLRA - RV LENGTH: 7.2 CM
BH CV XLRA - RV MID: 3.4 CM
BH CV XLRA - TDI S': 12 CM/SEC
IVRT: 97 MS
LEFT ATRIUM VOLUME INDEX: 31.5 ML/M2
LV EF 2D ECHO EST: 65 %
LV EF BIPLANE MOD: 63 %

## 2025-06-20 PROCEDURE — 93306 TTE W/DOPPLER COMPLETE: CPT

## 2025-06-26 ENCOUNTER — TELEPHONE (OUTPATIENT)
Dept: CARDIOLOGY | Facility: CLINIC | Age: 60
End: 2025-06-26

## 2025-06-26 NOTE — TELEPHONE ENCOUNTER
Caller: Jamaica Shell    Relationship to patient: Self    Best call back number: Mobile phone: [phone]     Chief complaint: PT CALLING IN TO GET R/S    Type of visit: FOLLOW UP    Requested date: NEXT AVAILABLE     If rescheduling, when is the original appointment: 6/25/25     Additional notes:NO APPTS AVAILABLE TO R/S PT. PLEASE CALL BACK TO ADVISE, THANK YOU.

## 2025-06-30 ENCOUNTER — TELEPHONE (OUTPATIENT)
Dept: CARDIOLOGY | Facility: CLINIC | Age: 60
End: 2025-06-30
Payer: MEDICAID

## 2025-07-15 DIAGNOSIS — I10 ESSENTIAL HYPERTENSION: ICD-10-CM

## 2025-07-15 DIAGNOSIS — I50.22 CHRONIC HFREF (HEART FAILURE WITH REDUCED EJECTION FRACTION): ICD-10-CM

## 2025-07-15 RX ORDER — SACUBITRIL AND VALSARTAN 97; 103 MG/1; MG/1
TABLET, FILM COATED ORAL
Qty: 60 TABLET | Refills: 1 | OUTPATIENT
Start: 2025-07-15

## 2025-07-18 ENCOUNTER — HOSPITAL ENCOUNTER (OUTPATIENT)
Dept: MAMMOGRAPHY | Facility: HOSPITAL | Age: 60
Discharge: HOME | End: 2025-07-18
Admitting: NURSE PRACTITIONER
Payer: MEDICAID

## 2025-07-18 ENCOUNTER — HOSPITAL ENCOUNTER (OUTPATIENT)
Dept: BONE DENSITY | Facility: HOSPITAL | Age: 60
Discharge: HOME | End: 2025-07-18
Payer: MEDICAID

## 2025-07-18 DIAGNOSIS — Z13.820 ENCOUNTER FOR OSTEOPOROSIS SCREENING IN ASYMPTOMATIC POSTMENOPAUSAL PATIENT: ICD-10-CM

## 2025-07-18 DIAGNOSIS — Z78.0 ENCOUNTER FOR OSTEOPOROSIS SCREENING IN ASYMPTOMATIC POSTMENOPAUSAL PATIENT: ICD-10-CM

## 2025-07-18 DIAGNOSIS — Z12.31 ENCOUNTER FOR SCREENING MAMMOGRAM FOR BREAST CANCER: ICD-10-CM

## 2025-07-18 PROCEDURE — 77067 SCR MAMMO BI INCL CAD: CPT

## 2025-07-18 PROCEDURE — 77080 DXA BONE DENSITY AXIAL: CPT

## 2025-07-18 PROCEDURE — 77063 BREAST TOMOSYNTHESIS BI: CPT

## 2025-07-22 ENCOUNTER — LAB (OUTPATIENT)
Dept: INTERNAL MEDICINE | Age: 60
End: 2025-07-22
Payer: MEDICAID

## 2025-07-22 ENCOUNTER — OFFICE VISIT (OUTPATIENT)
Dept: INTERNAL MEDICINE | Age: 60
End: 2025-07-22
Payer: MEDICAID

## 2025-07-22 VITALS
BODY MASS INDEX: 26.08 KG/M2 | SYSTOLIC BLOOD PRESSURE: 126 MMHG | HEART RATE: 71 BPM | HEIGHT: 63 IN | TEMPERATURE: 97.1 F | DIASTOLIC BLOOD PRESSURE: 78 MMHG | WEIGHT: 147.2 LBS | OXYGEN SATURATION: 98 %

## 2025-07-22 DIAGNOSIS — N28.9 FUNCTION KIDNEY DECREASED: ICD-10-CM

## 2025-07-22 DIAGNOSIS — R73.03 BORDERLINE DIABETES: Primary | ICD-10-CM

## 2025-07-22 DIAGNOSIS — I10 ESSENTIAL HYPERTENSION: ICD-10-CM

## 2025-07-22 DIAGNOSIS — F17.210 CIGARETTE SMOKER: ICD-10-CM

## 2025-07-22 DIAGNOSIS — M81.0 AGE-RELATED OSTEOPOROSIS WITHOUT CURRENT PATHOLOGICAL FRACTURE: ICD-10-CM

## 2025-07-22 DIAGNOSIS — Z12.11 SCREENING FOR COLON CANCER: ICD-10-CM

## 2025-07-22 DIAGNOSIS — M16.11 PRIMARY OSTEOARTHRITIS OF RIGHT HIP: ICD-10-CM

## 2025-07-22 LAB
25(OH)D3 SERPL-MCNC: 64.1 NG/ML (ref 30–100)
ALBUMIN SERPL-MCNC: 4.6 G/DL (ref 3.5–5.2)
ALBUMIN/GLOB SERPL: 1.5 G/DL
ALP SERPL-CCNC: 97 U/L (ref 39–117)
ALT SERPL W P-5'-P-CCNC: 15 U/L (ref 1–33)
ANION GAP SERPL CALCULATED.3IONS-SCNC: 13.5 MMOL/L (ref 5–15)
AST SERPL-CCNC: 16 U/L (ref 1–32)
BILIRUB SERPL-MCNC: <0.2 MG/DL (ref 0–1.2)
BUN SERPL-MCNC: 20 MG/DL (ref 6–20)
BUN/CREAT SERPL: 13.8 (ref 7–25)
CALCIUM SPEC-SCNC: 9.6 MG/DL (ref 8.6–10.5)
CHLORIDE SERPL-SCNC: 101 MMOL/L (ref 98–107)
CHOLEST SERPL-MCNC: 344 MG/DL (ref 0–200)
CO2 SERPL-SCNC: 21.5 MMOL/L (ref 22–29)
CREAT SERPL-MCNC: 1.45 MG/DL (ref 0.57–1)
DEPRECATED RDW RBC AUTO: 43.2 FL (ref 37–54)
EGFRCR SERPLBLD CKD-EPI 2021: 42.2 ML/MIN/1.73
ERYTHROCYTE [DISTWIDTH] IN BLOOD BY AUTOMATED COUNT: 15.2 % (ref 12.3–15.4)
EXPIRATION DATE: NORMAL
FOLATE SERPL-MCNC: >20 NG/ML (ref 4.78–24.2)
GLOBULIN UR ELPH-MCNC: 3 GM/DL
GLUCOSE SERPL-MCNC: 102 MG/DL (ref 65–99)
HBA1C MFR BLD: 5.6 % (ref 4.8–5.6)
HBA1C MFR BLD: 5.7 % (ref 4.5–5.7)
HCT VFR BLD AUTO: 37.9 % (ref 34–46.6)
HDLC SERPL-MCNC: 51 MG/DL (ref 40–60)
HGB BLD-MCNC: 12.6 G/DL (ref 12–15.9)
LDLC SERPL CALC-MCNC: 231 MG/DL (ref 0–100)
LDLC/HDLC SERPL: 4.58 {RATIO}
Lab: NORMAL
MCH RBC QN AUTO: 26.3 PG (ref 26.6–33)
MCHC RBC AUTO-ENTMCNC: 33.2 G/DL (ref 31.5–35.7)
MCV RBC AUTO: 79 FL (ref 79–97)
NT-PROBNP SERPL-MCNC: 147.3 PG/ML (ref 0–900)
PLATELET # BLD AUTO: 362 10*3/MM3 (ref 140–450)
PMV BLD AUTO: 9.7 FL (ref 6–12)
POTASSIUM SERPL-SCNC: 4 MMOL/L (ref 3.5–5.2)
PROT SERPL-MCNC: 7.6 G/DL (ref 6–8.5)
RBC # BLD AUTO: 4.8 10*6/MM3 (ref 3.77–5.28)
SODIUM SERPL-SCNC: 136 MMOL/L (ref 136–145)
TRIGL SERPL-MCNC: 298 MG/DL (ref 0–150)
TSH SERPL DL<=0.05 MIU/L-ACNC: 1.08 UIU/ML (ref 0.27–4.2)
VIT B12 BLD-MCNC: 698 PG/ML (ref 211–946)
VLDLC SERPL-MCNC: 62 MG/DL (ref 5–40)
WBC NRBC COR # BLD AUTO: 7.74 10*3/MM3 (ref 3.4–10.8)

## 2025-07-22 PROCEDURE — 83036 HEMOGLOBIN GLYCOSYLATED A1C: CPT | Performed by: NURSE PRACTITIONER

## 2025-07-22 PROCEDURE — 99214 OFFICE O/P EST MOD 30 MIN: CPT | Performed by: NURSE PRACTITIONER

## 2025-07-22 PROCEDURE — 82306 VITAMIN D 25 HYDROXY: CPT | Performed by: NURSE PRACTITIONER

## 2025-07-22 PROCEDURE — 36415 COLL VENOUS BLD VENIPUNCTURE: CPT | Performed by: NURSE PRACTITIONER

## 2025-07-22 PROCEDURE — 82607 VITAMIN B-12: CPT | Performed by: NURSE PRACTITIONER

## 2025-07-22 PROCEDURE — 3074F SYST BP LT 130 MM HG: CPT | Performed by: NURSE PRACTITIONER

## 2025-07-22 PROCEDURE — 80061 LIPID PANEL: CPT | Performed by: NURSE PRACTITIONER

## 2025-07-22 PROCEDURE — 82746 ASSAY OF FOLIC ACID SERUM: CPT | Performed by: NURSE PRACTITIONER

## 2025-07-22 PROCEDURE — 83880 ASSAY OF NATRIURETIC PEPTIDE: CPT | Performed by: NURSE PRACTITIONER

## 2025-07-22 PROCEDURE — 84443 ASSAY THYROID STIM HORMONE: CPT | Performed by: NURSE PRACTITIONER

## 2025-07-22 PROCEDURE — 1159F MED LIST DOCD IN RCRD: CPT | Performed by: NURSE PRACTITIONER

## 2025-07-22 PROCEDURE — 85027 COMPLETE CBC AUTOMATED: CPT | Performed by: NURSE PRACTITIONER

## 2025-07-22 PROCEDURE — 80053 COMPREHEN METABOLIC PANEL: CPT | Performed by: NURSE PRACTITIONER

## 2025-07-22 PROCEDURE — 3078F DIAST BP <80 MM HG: CPT | Performed by: NURSE PRACTITIONER

## 2025-07-22 PROCEDURE — 3044F HG A1C LEVEL LT 7.0%: CPT | Performed by: NURSE PRACTITIONER

## 2025-07-22 PROCEDURE — 1125F AMNT PAIN NOTED PAIN PRSNT: CPT | Performed by: NURSE PRACTITIONER

## 2025-07-22 PROCEDURE — 1160F RVW MEDS BY RX/DR IN RCRD: CPT | Performed by: NURSE PRACTITIONER

## 2025-07-22 RX ORDER — ALENDRONATE SODIUM 10 MG/1
10 TABLET ORAL
Qty: 90 TABLET | Refills: 1 | Status: SHIPPED | OUTPATIENT
Start: 2025-07-22

## 2025-07-22 RX ORDER — VARENICLINE TARTRATE 1 MG/1
1 TABLET, FILM COATED ORAL 2 TIMES DAILY
Qty: 56 TABLET | Refills: 4 | Status: SHIPPED | OUTPATIENT
Start: 2025-07-22 | End: 2025-12-09

## 2025-07-22 NOTE — PROGRESS NOTES
Subjective   Chief Complaint   Patient presents with    Results     Mammogram. Bone density.     Retaining fluid         Jamaicacoral Shell is a 59 y.o. female.    History of Present Illness  The patient presents for evaluation of hip pain, smoking cessation, osteoporosis, breast asymmetry, abdominal swelling, and health maintenance.    She reports experiencing severe leg pain yesterday, which was so intense that it brought her to tears. The pain is primarily located in her hip. She has been considering a hip replacement but needs to address dental issues and quit smoking first. She has reduced her smoking habit and is currently taking Chantix twice daily to aid in smoking cessation. She has been using tramadol for pain management, which usually provides relief, but it was ineffective this time. She also takes Tylenol.    She has been taking calcium and vitamin D supplements.    She has noticed an increase in the size of one of her breasts, which she believes may be due to fluid retention. She reports no tenderness in the area. She has not had a mammogram in a long time.    She continues to experience abdominal swelling and pain, which occasionally develops rashes. She has not had a colonoscopy recently. She is taking diuretics.    She has received disability papers to fill out and is unsure about the process. She does not have a .    Tobacco: She has reduced her smoking habit and is currently taking Chantix twice daily to aid in smoking cessation.  Coffee/Tea/Caffeine-containing Drinks: She consumes coffee.        I have reviewed the following portions of the patient's history and confirmed they are accurate: allergies, current medications, past family history, past medical history, past social history, past surgical history, and problem list    Review of Systems  Pertinent items are noted in HPI.     Current Outpatient Medications on File Prior to Visit   Medication Sig    albuterol sulfate HFA (Ventolin  HFA) 108 (90 Base) MCG/ACT inhaler INHALE TWO PUFFS BY MOUTH EVERY 4 TO 6 HOURS AS NEEDED FOR COUGH ,  SHORTNESS OF BREATH , OR WHEEZING    aspirin (Aspirin Low Dose) 81 MG EC tablet TAKE 1 TABLET BY MOUTH DAILY    atorvastatin (Lipitor) 80 MG tablet Take 1 tablet by mouth Daily.    baclofen (LIORESAL) 10 MG tablet TAKE ONE TABLET BY MOUTH THREE TIMES A DAY AS NEEDED FOR MUSCLE SPASMS    buPROPion XL (WELLBUTRIN XL) 300 MG 24 hr tablet TAKE 1 TABLET BY MOUTH DAILY    busPIRone (BUSPAR) 5 MG tablet TAKE ONE TO TWO TABLETS BY MOUTH THREE TIMES A DAY AS NEEDED FOR ANXIETY    Calcium Carbonate 1500 (600 Ca) MG tablet     chlorhexidine (PERIDEX) 0.12 % solution     Cholecalciferol (Vitamin D) 50 MCG (2000 UT) tablet Take 1 tablet by mouth Daily.    Denta 5000 Plus 1.1 % cream     Diclofenac Sodium (VOLTAREN) 1 % gel gel APPLY ONE GRAM TOPICALLY TO INDICATED AREA FOUR TIMES A DAY AS NEEDED FOR MILD TO MODERATE PAIN    FeroSul 325 (65 Fe) MG tablet TAKE 1 TABLET BY MOUTH DAILY WITH BREAKFAST    FLUoxetine (PROzac) 20 MG capsule TAKE THREE CAPSULES BY MOUTH DAILY    multivitamin (Multiple Vitamin) tablet tablet Take 1 tablet by mouth Daily.    ondansetron ODT (ZOFRAN-ODT) 4 MG disintegrating tablet Place 1 tablet on the tongue Every 6 (Six) Hours As Needed for Nausea or Vomiting.    potassium chloride ER (K-TAB) 20 MEQ tablet controlled-release ER tablet Take 1 tablet by mouth Daily.    spironolactone (Aldactone) 25 MG tablet Take 1 tablet by mouth Daily.    traMADol (ULTRAM) 50 MG tablet Take 1 tablet by mouth Every 6 (Six) Hours As Needed for Moderate Pain.    bisoprolol (ZEBeta) 10 MG tablet Take 1 tablet by mouth Daily.    bumetanide (BUMEX) 2 MG tablet Take 1 tablet by mouth Daily. FLUID PILL    empagliflozin (Jardiance) 10 MG tablet tablet Take 1 tablet by mouth Daily. Need f/u appt for further refills.    folic acid (FOLVITE) 1 MG tablet Take 1 tablet by mouth Daily.    mirtazapine (REMERON) 7.5 MG tablet TAKE ONE  "TABLET BY MOUTH ONCE NIGHTLY    omeprazole (priLOSEC) 20 MG capsule TAKE 1 CAPSULE BY MOUTH DAILY    oxybutynin XL (DITROPAN-XL) 10 MG 24 hr tablet TAKE 1 TABLET BY MOUTH DAILY    sacubitril-valsartan (Entresto)  MG tablet Take 1 tablet by mouth 2 (Two) Times a Day. MUST KEEP APPOINTMENT FOR FURTHER REFILLS     No current facility-administered medications on file prior to visit.       Objective   Vitals:    07/22/25 0843   BP: 126/78   BP Location: Left arm   Patient Position: Sitting   Cuff Size: Adult   Pulse: 71   Temp: 97.1 °F (36.2 °C)   SpO2: 98%   Weight: 66.8 kg (147 lb 3.2 oz)   Height: 160 cm (62.99\")     Body mass index is 26.08 kg/m².    Physical Exam  Vitals reviewed.   Constitutional:       Appearance: Normal appearance. She is well-developed.   HENT:      Head: Normocephalic and atraumatic.      Nose: Nose normal.   Eyes:      General: Lids are normal.      Conjunctiva/sclera: Conjunctivae normal.      Pupils: Pupils are equal, round, and reactive to light.   Neck:      Thyroid: No thyromegaly.      Trachea: Trachea normal.   Cardiovascular:      Rate and Rhythm: Normal rate and regular rhythm.      Heart sounds: Normal heart sounds.   Pulmonary:      Effort: Pulmonary effort is normal. No respiratory distress.      Breath sounds: Normal breath sounds.   Abdominal:      General: Bowel sounds are normal. There is distension.      Palpations: Abdomen is soft.   Skin:     General: Skin is warm and dry.   Neurological:      Mental Status: She is alert and oriented to person, place, and time.      GCS: GCS eye subscore is 4. GCS verbal subscore is 5. GCS motor subscore is 6.   Psychiatric:         Attention and Perception: Attention normal.         Mood and Affect: Mood normal.         Speech: Speech normal.         Behavior: Behavior normal. Behavior is cooperative.         Thought Content: Thought content normal.         Results  Labs   - A1c: 5.7%    Imaging   - Mammogram: 07/18/2025, Dense " breasts and asymmetry with dense tissue at the bottom of the left breast   - Bone scan: Beginning stages of osteoporosis in the lower back and osteoporosis in both hips, with the right hip being a total osteoporosis   - CT of abdomen: 03/2025, Adrenal gland cyst and a fatty cyst on the kidney    Lab Results   Component Value Date    WBC 7.74 07/22/2025    HGB 12.6 07/22/2025    HCT 37.9 07/22/2025    MCV 79.0 07/22/2025     07/22/2025      Lab Results   Component Value Date    GLUCOSE 102 (H) 07/22/2025    BUN 20.0 07/22/2025    CREATININE 1.45 (H) 07/22/2025     07/22/2025    K 4.0 07/22/2025     07/22/2025    CALCIUM 9.6 07/22/2025    PROTEINTOT 7.6 07/22/2025    ALBUMIN 4.6 07/22/2025    ALT 15 07/22/2025    AST 16 07/22/2025    ALKPHOS 97 07/22/2025    BILITOT <0.2 07/22/2025    GLOB 3.0 07/22/2025    AGRATIO 1.5 07/22/2025    BCR 13.8 07/22/2025    ANIONGAP 13.5 07/22/2025    EGFR 42.2 (L) 07/22/2025      Lab Results   Component Value Date    HGBA1C 5.60 07/22/2025      Lab Results   Component Value Date    CHOL 344 (H) 07/22/2025    TRIG 298 (H) 07/22/2025    HDL 51 07/22/2025     (H) 07/22/2025      Lab Results   Component Value Date    TSH 1.080 07/22/2025          Assessment & Plan   Problem List Items Addressed This Visit       Essential hypertension    Borderline diabetes - Primary    Relevant Orders    POC Glycosylated Hemoglobin (Hb A1C) (Completed)     Other Visit Diagnoses         Primary osteoarthritis of right hip          Cigarette smoker        Relevant Medications    varenicline (CHANTIX) 1 MG tablet      Age-related osteoporosis without current pathological fracture        Relevant Medications    alendronate (FOSAMAX) 10 MG tablet      Screening for colon cancer        Relevant Orders    Ambulatory Referral For Screening Colonoscopy               Current Outpatient Medications:     albuterol sulfate HFA (Ventolin HFA) 108 (90 Base) MCG/ACT inhaler, INHALE TWO PUFFS BY  MOUTH EVERY 4 TO 6 HOURS AS NEEDED FOR COUGH ,  SHORTNESS OF BREATH , OR WHEEZING, Disp: 18 g, Rfl: 0    aspirin (Aspirin Low Dose) 81 MG EC tablet, TAKE 1 TABLET BY MOUTH DAILY, Disp: 90 tablet, Rfl: 3    atorvastatin (Lipitor) 80 MG tablet, Take 1 tablet by mouth Daily., Disp: 30 tablet, Rfl: 5    baclofen (LIORESAL) 10 MG tablet, TAKE ONE TABLET BY MOUTH THREE TIMES A DAY AS NEEDED FOR MUSCLE SPASMS, Disp: 90 tablet, Rfl: 2    buPROPion XL (WELLBUTRIN XL) 300 MG 24 hr tablet, TAKE 1 TABLET BY MOUTH DAILY, Disp: 30 tablet, Rfl: 3    busPIRone (BUSPAR) 5 MG tablet, TAKE ONE TO TWO TABLETS BY MOUTH THREE TIMES A DAY AS NEEDED FOR ANXIETY, Disp: 90 tablet, Rfl: 3    Calcium Carbonate 1500 (600 Ca) MG tablet, , Disp: , Rfl:     chlorhexidine (PERIDEX) 0.12 % solution, , Disp: , Rfl:     Cholecalciferol (Vitamin D) 50 MCG (2000 UT) tablet, Take 1 tablet by mouth Daily., Disp: 90 tablet, Rfl: 1    Denta 5000 Plus 1.1 % cream, , Disp: , Rfl:     Diclofenac Sodium (VOLTAREN) 1 % gel gel, APPLY ONE GRAM TOPICALLY TO INDICATED AREA FOUR TIMES A DAY AS NEEDED FOR MILD TO MODERATE PAIN, Disp: 100 g, Rfl: 2    FeroSul 325 (65 Fe) MG tablet, TAKE 1 TABLET BY MOUTH DAILY WITH BREAKFAST, Disp: 30 tablet, Rfl: 5    FLUoxetine (PROzac) 20 MG capsule, TAKE THREE CAPSULES BY MOUTH DAILY, Disp: 90 capsule, Rfl: 3    multivitamin (Multiple Vitamin) tablet tablet, Take 1 tablet by mouth Daily., Disp: 30 tablet, Rfl: 5    ondansetron ODT (ZOFRAN-ODT) 4 MG disintegrating tablet, Place 1 tablet on the tongue Every 6 (Six) Hours As Needed for Nausea or Vomiting., Disp: 30 tablet, Rfl: 1    potassium chloride ER (K-TAB) 20 MEQ tablet controlled-release ER tablet, Take 1 tablet by mouth Daily., Disp: 90 tablet, Rfl: 3    spironolactone (Aldactone) 25 MG tablet, Take 1 tablet by mouth Daily., Disp: 90 tablet, Rfl: 3    traMADol (ULTRAM) 50 MG tablet, Take 1 tablet by mouth Every 6 (Six) Hours As Needed for Moderate Pain., Disp: 28 tablet, Rfl:  2    varenicline (CHANTIX) 1 MG tablet, Take 1 tablet by mouth 2 (Two) Times a Day for 140 days., Disp: 56 tablet, Rfl: 4    alendronate (FOSAMAX) 10 MG tablet, Take 1 tablet by mouth Every Morning Before Breakfast., Disp: 90 tablet, Rfl: 1    bisoprolol (ZEBeta) 10 MG tablet, Take 1 tablet by mouth Daily., Disp: 90 tablet, Rfl: 3    bumetanide (BUMEX) 2 MG tablet, Take 1 tablet by mouth Daily. FLUID PILL, Disp: 30 tablet, Rfl: 5    empagliflozin (Jardiance) 10 MG tablet tablet, Take 1 tablet by mouth Daily. Need f/u appt for further refills., Disp: 30 tablet, Rfl: 0    folic acid (FOLVITE) 1 MG tablet, Take 1 tablet by mouth Daily., Disp: 90 tablet, Rfl: 3    mirtazapine (REMERON) 7.5 MG tablet, TAKE ONE TABLET BY MOUTH ONCE NIGHTLY, Disp: 90 tablet, Rfl: 3    omeprazole (priLOSEC) 20 MG capsule, TAKE 1 CAPSULE BY MOUTH DAILY, Disp: 30 capsule, Rfl: 5    oxybutynin XL (DITROPAN-XL) 10 MG 24 hr tablet, TAKE 1 TABLET BY MOUTH DAILY, Disp: 90 tablet, Rfl: 3    sacubitril-valsartan (Entresto)  MG tablet, Take 1 tablet by mouth 2 (Two) Times a Day. MUST KEEP APPOINTMENT FOR FURTHER REFILLS, Disp: 60 tablet, Rfl: 1    Assessment & Plan  1. Hip pain.  - The patient's hip pain is likely due to the need for a hip replacement.  - Blood pressure and heart rate are within normal limits.  - Advised to quit smoking to improve healing post-surgery and discussed the need for dental clearance.  - Referral for a physical capacity exam will be made to assess her ability to lift weight and climb stairs.    2. Smoking cessation.  - Currently taking Chantix to help quit smoking and reports it is helping reduce smoking.  - Blood work will be done today to check overall health status.  - Advised to continue taking Chantix 1 mg in the morning and 1 mg at night.  - Refills for Chantix have been provided.    3. Osteoporosis.  - Diagnosed with osteoporosis in the lower back and both hips, with the right hip being severely affected.  -  Bone scan shows beginning stages of osteoporosis in the lower back and total osteoporosis in the right hip.  - Prescribed Fosamax 10 mg daily to help build bones and advised to take it in the morning before breakfast and avoid lying down immediately after taking it.  - Refills for calcium and vitamin D have been provided and advised to include calcium-rich foods in the diet.    4. Breast asymmetry.  - Mammogram showed dense tissue in both breasts, with asymmetry noted in the left breast.  - Ultrasound recommended to further investigate the dense tissue in the left breast.  - Will be contacted for an appointment; if not contacted within a week, advised to follow up with the office.    5. Abdominal swelling.  - Adrenal gland cyst and fatty cyst on the kidney could be contributing to abdominal swelling.  - Referral to a nephrologist will be made for chronic kidney disease if labs continue to be abnormal. Will ask for nephrology opinion on if further evaluation of the kidney cyst is needed. If so then referral will be made to urology.  - Colonoscopy recommended to rule out other causes of abdominal swelling.     6. Health maintenance.  - A1c level is 5.7, indicating good control of diabetes.  - Blood work will be done today to check overall health status.    Follow-up: The patient will follow up in 1 month.         Plan of care reviewed with the patient at the conclusion of today's visit.  Education was provided regarding diagnosis, management, and any prescribed or recommended OTC medications.  Patient verbalized understanding of and agreement with management plan.     Return in about 1 month (around 8/22/2025), or if symptoms worsen or fail to improve.      Transcribed from ambient dictation for MARTIN Ramirez by MARTIN Ramirez.  07/22/25   09:15 EDT    Patient or patient representative verbalized consent for the use of Ambient Listening during the visit with  Candace Taylor  APRN for chart documentation. 7/28/2025  09:37 EDT

## 2025-07-28 RX ORDER — FERROUS SULFATE 325(65) MG
1 TABLET ORAL
Qty: 30 TABLET | Refills: 5 | Status: SHIPPED | OUTPATIENT
Start: 2025-07-28

## 2025-07-28 RX ORDER — DOXEPIN HYDROCHLORIDE 50 MG/1
1 CAPSULE ORAL DAILY
Qty: 30 TABLET | Refills: 5 | Status: SHIPPED | OUTPATIENT
Start: 2025-07-28

## 2025-07-31 ENCOUNTER — TELEPHONE (OUTPATIENT)
Dept: INTERNAL MEDICINE | Age: 60
End: 2025-07-31
Payer: MEDICAID

## 2025-08-19 RX ORDER — SODIUM, POTASSIUM,MAG SULFATES 17.5-3.13G
SOLUTION, RECONSTITUTED, ORAL ORAL
Qty: 354 ML | Refills: 0 | Status: SHIPPED | OUTPATIENT
Start: 2025-08-19

## 2025-08-25 ENCOUNTER — TELEPHONE (OUTPATIENT)
Dept: GASTROENTEROLOGY | Facility: CLINIC | Age: 60
End: 2025-08-25
Payer: MEDICAID